# Patient Record
Sex: FEMALE | Race: WHITE | NOT HISPANIC OR LATINO | Employment: OTHER | URBAN - METROPOLITAN AREA
[De-identification: names, ages, dates, MRNs, and addresses within clinical notes are randomized per-mention and may not be internally consistent; named-entity substitution may affect disease eponyms.]

---

## 2017-05-22 ENCOUNTER — HOSPITAL ENCOUNTER (INPATIENT)
Facility: CLINIC | Age: 67
LOS: 2 days | Discharge: HOME | DRG: 390 | End: 2017-05-24
Attending: EMERGENCY MEDICINE | Admitting: HOSPITALIST
Payer: COMMERCIAL

## 2017-05-22 ENCOUNTER — APPOINTMENT (OUTPATIENT)
Dept: CT IMAGING | Facility: CLINIC | Age: 67
DRG: 390 | End: 2017-05-22
Attending: EMERGENCY MEDICINE
Payer: COMMERCIAL

## 2017-05-22 DIAGNOSIS — K56.609 SBO (SMALL BOWEL OBSTRUCTION) (H): ICD-10-CM

## 2017-05-22 LAB
ALBUMIN SERPL-MCNC: 4.3 G/DL (ref 3.4–5)
ALBUMIN UR-MCNC: NEGATIVE MG/DL
ALP SERPL-CCNC: 88 U/L (ref 40–150)
ALT SERPL W P-5'-P-CCNC: 32 U/L (ref 0–50)
ANION GAP SERPL CALCULATED.3IONS-SCNC: 12 MMOL/L (ref 3–14)
APPEARANCE UR: ABNORMAL
AST SERPL W P-5'-P-CCNC: 29 U/L (ref 0–45)
BASOPHILS # BLD AUTO: 0 10E9/L (ref 0–0.2)
BASOPHILS NFR BLD AUTO: 0.3 %
BILIRUB SERPL-MCNC: 0.6 MG/DL (ref 0.2–1.3)
BILIRUB UR QL STRIP: NEGATIVE
BUN SERPL-MCNC: 12 MG/DL (ref 7–30)
CALCIUM SERPL-MCNC: 10.2 MG/DL (ref 8.5–10.1)
CHLORIDE SERPL-SCNC: 104 MMOL/L (ref 94–109)
CO2 SERPL-SCNC: 25 MMOL/L (ref 20–32)
COLOR UR AUTO: YELLOW
CREAT SERPL-MCNC: 0.68 MG/DL (ref 0.52–1.04)
DIFFERENTIAL METHOD BLD: NORMAL
EOSINOPHIL # BLD AUTO: 0.1 10E9/L (ref 0–0.7)
EOSINOPHIL NFR BLD AUTO: 0.5 %
ERYTHROCYTE [DISTWIDTH] IN BLOOD BY AUTOMATED COUNT: 12.3 % (ref 10–15)
GFR SERPL CREATININE-BSD FRML MDRD: 87 ML/MIN/1.7M2
GLUCOSE SERPL-MCNC: 118 MG/DL (ref 70–99)
GLUCOSE UR STRIP-MCNC: NEGATIVE MG/DL
HCT VFR BLD AUTO: 41.9 % (ref 35–47)
HGB BLD-MCNC: 14.1 G/DL (ref 11.7–15.7)
HGB UR QL STRIP: NEGATIVE
IMM GRANULOCYTES # BLD: 0 10E9/L (ref 0–0.4)
IMM GRANULOCYTES NFR BLD: 0.3 %
KETONES UR STRIP-MCNC: NEGATIVE MG/DL
LEUKOCYTE ESTERASE UR QL STRIP: ABNORMAL
LIPASE SERPL-CCNC: 160 U/L (ref 73–393)
LYMPHOCYTES # BLD AUTO: 1.6 10E9/L (ref 0.8–5.3)
LYMPHOCYTES NFR BLD AUTO: 17 %
MCH RBC QN AUTO: 29 PG (ref 26.5–33)
MCHC RBC AUTO-ENTMCNC: 33.7 G/DL (ref 31.5–36.5)
MCV RBC AUTO: 86 FL (ref 78–100)
MONOCYTES # BLD AUTO: 0.8 10E9/L (ref 0–1.3)
MONOCYTES NFR BLD AUTO: 8.2 %
NEUTROPHILS # BLD AUTO: 6.8 10E9/L (ref 1.6–8.3)
NEUTROPHILS NFR BLD AUTO: 73.7 %
NITRATE UR QL: NEGATIVE
NRBC # BLD AUTO: 0 10*3/UL
NRBC BLD AUTO-RTO: 0 /100
PH UR STRIP: 8 PH (ref 5–7)
PLATELET # BLD AUTO: 274 10E9/L (ref 150–450)
POTASSIUM SERPL-SCNC: 3.6 MMOL/L (ref 3.4–5.3)
PROT SERPL-MCNC: 8 G/DL (ref 6.8–8.8)
RBC # BLD AUTO: 4.86 10E12/L (ref 3.8–5.2)
SODIUM SERPL-SCNC: 141 MMOL/L (ref 133–144)
SP GR UR STRIP: 1.01 (ref 1–1.03)
URN SPEC COLLECT METH UR: ABNORMAL
UROBILINOGEN UR STRIP-MCNC: NORMAL MG/DL (ref 0–2)
WBC # BLD AUTO: 9.2 10E9/L (ref 4–11)

## 2017-05-22 PROCEDURE — 25000128 H RX IP 250 OP 636: Performed by: EMERGENCY MEDICINE

## 2017-05-22 PROCEDURE — 99223 1ST HOSP IP/OBS HIGH 75: CPT | Mod: AI | Performed by: HOSPITALIST

## 2017-05-22 PROCEDURE — 85025 COMPLETE CBC W/AUTO DIFF WBC: CPT | Performed by: EMERGENCY MEDICINE

## 2017-05-22 PROCEDURE — 96361 HYDRATE IV INFUSION ADD-ON: CPT

## 2017-05-22 PROCEDURE — 74177 CT ABD & PELVIS W/CONTRAST: CPT

## 2017-05-22 PROCEDURE — 81003 URINALYSIS AUTO W/O SCOPE: CPT | Performed by: EMERGENCY MEDICINE

## 2017-05-22 PROCEDURE — 99285 EMERGENCY DEPT VISIT HI MDM: CPT | Mod: 25

## 2017-05-22 PROCEDURE — 12000000 ZZH R&B MED SURG/OB

## 2017-05-22 PROCEDURE — 25000128 H RX IP 250 OP 636: Performed by: HOSPITALIST

## 2017-05-22 PROCEDURE — 96375 TX/PRO/DX INJ NEW DRUG ADDON: CPT

## 2017-05-22 PROCEDURE — 83690 ASSAY OF LIPASE: CPT | Performed by: EMERGENCY MEDICINE

## 2017-05-22 PROCEDURE — 80053 COMPREHEN METABOLIC PANEL: CPT | Performed by: EMERGENCY MEDICINE

## 2017-05-22 PROCEDURE — 25000125 ZZHC RX 250: Performed by: EMERGENCY MEDICINE

## 2017-05-22 PROCEDURE — 96374 THER/PROPH/DIAG INJ IV PUSH: CPT

## 2017-05-22 PROCEDURE — 25000125 ZZHC RX 250: Performed by: HOSPITALIST

## 2017-05-22 PROCEDURE — S0028 INJECTION, FAMOTIDINE, 20 MG: HCPCS | Performed by: HOSPITALIST

## 2017-05-22 RX ORDER — KETOROLAC TROMETHAMINE 15 MG/ML
15 INJECTION, SOLUTION INTRAMUSCULAR; INTRAVENOUS EVERY 6 HOURS PRN
Status: DISCONTINUED | OUTPATIENT
Start: 2017-05-22 | End: 2017-05-24 | Stop reason: HOSPADM

## 2017-05-22 RX ORDER — ONDANSETRON 2 MG/ML
4 INJECTION INTRAMUSCULAR; INTRAVENOUS EVERY 6 HOURS PRN
Status: DISCONTINUED | OUTPATIENT
Start: 2017-05-22 | End: 2017-05-24 | Stop reason: HOSPADM

## 2017-05-22 RX ORDER — IOPAMIDOL 755 MG/ML
70 INJECTION, SOLUTION INTRAVASCULAR ONCE
Status: COMPLETED | OUTPATIENT
Start: 2017-05-22 | End: 2017-05-22

## 2017-05-22 RX ORDER — POLYETHYLENE GLYCOL 3350 17 G/17G
17 POWDER, FOR SOLUTION ORAL DAILY PRN
Status: DISCONTINUED | OUTPATIENT
Start: 2017-05-22 | End: 2017-05-24 | Stop reason: HOSPADM

## 2017-05-22 RX ORDER — KETOROLAC TROMETHAMINE 15 MG/ML
15 INJECTION, SOLUTION INTRAMUSCULAR; INTRAVENOUS ONCE
Status: COMPLETED | OUTPATIENT
Start: 2017-05-22 | End: 2017-05-22

## 2017-05-22 RX ORDER — POTASSIUM CHLORIDE 1500 MG/1
20-40 TABLET, EXTENDED RELEASE ORAL
Status: DISCONTINUED | OUTPATIENT
Start: 2017-05-22 | End: 2017-05-24 | Stop reason: HOSPADM

## 2017-05-22 RX ORDER — METOPROLOL TARTRATE 1 MG/ML
5 INJECTION, SOLUTION INTRAVENOUS 4 TIMES DAILY
Status: DISCONTINUED | OUTPATIENT
Start: 2017-05-22 | End: 2017-05-23

## 2017-05-22 RX ORDER — POTASSIUM CHLORIDE 7.45 MG/ML
10 INJECTION INTRAVENOUS
Status: DISCONTINUED | OUTPATIENT
Start: 2017-05-22 | End: 2017-05-24 | Stop reason: HOSPADM

## 2017-05-22 RX ORDER — PROCHLORPERAZINE 25 MG
12.5 SUPPOSITORY, RECTAL RECTAL EVERY 12 HOURS PRN
Status: DISCONTINUED | OUTPATIENT
Start: 2017-05-22 | End: 2017-05-24 | Stop reason: HOSPADM

## 2017-05-22 RX ORDER — SODIUM CHLORIDE 9 MG/ML
INJECTION, SOLUTION INTRAVENOUS CONTINUOUS
Status: DISCONTINUED | OUTPATIENT
Start: 2017-05-22 | End: 2017-05-24 | Stop reason: HOSPADM

## 2017-05-22 RX ORDER — BISACODYL 10 MG
10 SUPPOSITORY, RECTAL RECTAL DAILY PRN
Status: DISCONTINUED | OUTPATIENT
Start: 2017-05-22 | End: 2017-05-24 | Stop reason: HOSPADM

## 2017-05-22 RX ORDER — ACETAMINOPHEN 650 MG/1
650 SUPPOSITORY RECTAL EVERY 4 HOURS PRN
Status: DISCONTINUED | OUTPATIENT
Start: 2017-05-22 | End: 2017-05-24 | Stop reason: HOSPADM

## 2017-05-22 RX ORDER — ACETAMINOPHEN 325 MG/1
650 TABLET ORAL EVERY 4 HOURS PRN
Status: DISCONTINUED | OUTPATIENT
Start: 2017-05-22 | End: 2017-05-24 | Stop reason: HOSPADM

## 2017-05-22 RX ORDER — POTASSIUM CHLORIDE 29.8 MG/ML
20 INJECTION INTRAVENOUS
Status: DISCONTINUED | OUTPATIENT
Start: 2017-05-22 | End: 2017-05-24 | Stop reason: HOSPADM

## 2017-05-22 RX ORDER — AMOXICILLIN 250 MG
1-2 CAPSULE ORAL 2 TIMES DAILY PRN
Status: DISCONTINUED | OUTPATIENT
Start: 2017-05-22 | End: 2017-05-24 | Stop reason: HOSPADM

## 2017-05-22 RX ORDER — POTASSIUM CL/LIDO/0.9 % NACL 10MEQ/0.1L
10 INTRAVENOUS SOLUTION, PIGGYBACK (ML) INTRAVENOUS
Status: DISCONTINUED | OUTPATIENT
Start: 2017-05-22 | End: 2017-05-24 | Stop reason: HOSPADM

## 2017-05-22 RX ORDER — ONDANSETRON 2 MG/ML
4 INJECTION INTRAMUSCULAR; INTRAVENOUS EVERY 30 MIN PRN
Status: DISCONTINUED | OUTPATIENT
Start: 2017-05-22 | End: 2017-05-22

## 2017-05-22 RX ORDER — NALOXONE HYDROCHLORIDE 0.4 MG/ML
.1-.4 INJECTION, SOLUTION INTRAMUSCULAR; INTRAVENOUS; SUBCUTANEOUS
Status: DISCONTINUED | OUTPATIENT
Start: 2017-05-22 | End: 2017-05-24 | Stop reason: HOSPADM

## 2017-05-22 RX ORDER — ONDANSETRON 4 MG/1
4 TABLET, ORALLY DISINTEGRATING ORAL EVERY 6 HOURS PRN
Status: DISCONTINUED | OUTPATIENT
Start: 2017-05-22 | End: 2017-05-24 | Stop reason: HOSPADM

## 2017-05-22 RX ORDER — HYDROMORPHONE HYDROCHLORIDE 1 MG/ML
.3-.5 INJECTION, SOLUTION INTRAMUSCULAR; INTRAVENOUS; SUBCUTANEOUS
Status: DISCONTINUED | OUTPATIENT
Start: 2017-05-22 | End: 2017-05-24 | Stop reason: HOSPADM

## 2017-05-22 RX ORDER — PROCHLORPERAZINE MALEATE 5 MG
5 TABLET ORAL EVERY 6 HOURS PRN
Status: DISCONTINUED | OUTPATIENT
Start: 2017-05-22 | End: 2017-05-24 | Stop reason: HOSPADM

## 2017-05-22 RX ORDER — HYDROCODONE BITARTRATE AND ACETAMINOPHEN 5; 325 MG/1; MG/1
1-2 TABLET ORAL EVERY 4 HOURS PRN
Status: DISCONTINUED | OUTPATIENT
Start: 2017-05-22 | End: 2017-05-24 | Stop reason: HOSPADM

## 2017-05-22 RX ORDER — LIDOCAINE 40 MG/G
CREAM TOPICAL
Status: DISCONTINUED | OUTPATIENT
Start: 2017-05-22 | End: 2017-05-24 | Stop reason: HOSPADM

## 2017-05-22 RX ORDER — SODIUM CHLORIDE 9 MG/ML
1000 INJECTION, SOLUTION INTRAVENOUS CONTINUOUS
Status: DISCONTINUED | OUTPATIENT
Start: 2017-05-22 | End: 2017-05-22

## 2017-05-22 RX ORDER — POTASSIUM CHLORIDE 1.5 G/1.58G
20-40 POWDER, FOR SOLUTION ORAL
Status: DISCONTINUED | OUTPATIENT
Start: 2017-05-22 | End: 2017-05-24 | Stop reason: HOSPADM

## 2017-05-22 RX ORDER — HYDROMORPHONE HYDROCHLORIDE 1 MG/ML
0.5 INJECTION, SOLUTION INTRAMUSCULAR; INTRAVENOUS; SUBCUTANEOUS
Status: DISCONTINUED | OUTPATIENT
Start: 2017-05-22 | End: 2017-05-22

## 2017-05-22 RX ADMIN — METOPROLOL TARTRATE 5 MG: 5 INJECTION INTRAVENOUS at 22:21

## 2017-05-22 RX ADMIN — HYDROMORPHONE HYDROCHLORIDE 0.5 MG: 1 INJECTION, SOLUTION INTRAMUSCULAR; INTRAVENOUS; SUBCUTANEOUS at 16:44

## 2017-05-22 RX ADMIN — FAMOTIDINE 20 MG: 10 INJECTION, SOLUTION INTRAVENOUS at 22:20

## 2017-05-22 RX ADMIN — ONDANSETRON 4 MG: 2 SOLUTION INTRAMUSCULAR; INTRAVENOUS at 16:38

## 2017-05-22 RX ADMIN — SODIUM CHLORIDE: 9 INJECTION, SOLUTION INTRAVENOUS at 20:27

## 2017-05-22 RX ADMIN — SODIUM CHLORIDE 1000 ML: 9 INJECTION, SOLUTION INTRAVENOUS at 16:45

## 2017-05-22 RX ADMIN — KETOROLAC TROMETHAMINE 15 MG: 15 INJECTION, SOLUTION INTRAMUSCULAR; INTRAVENOUS at 16:34

## 2017-05-22 RX ADMIN — IOPAMIDOL 70 ML: 755 INJECTION, SOLUTION INTRAVENOUS at 17:34

## 2017-05-22 RX ADMIN — SODIUM CHLORIDE 61 ML: 9 INJECTION, SOLUTION INTRAVENOUS at 17:34

## 2017-05-22 ASSESSMENT — ENCOUNTER SYMPTOMS
ABDOMINAL DISTENTION: 0
CHEST TIGHTNESS: 0
NEUROLOGICAL NEGATIVE: 1
CHILLS: 0
DIARRHEA: 0
BLOOD IN STOOL: 0
FEVER: 0
ABDOMINAL PAIN: 1
JOINT SWELLING: 0
DYSURIA: 0
NAUSEA: 1
SHORTNESS OF BREATH: 0
COUGH: 0
SORE THROAT: 0
CONSTIPATION: 0
BACK PAIN: 1

## 2017-05-22 NOTE — ED NOTES
"[facility]  ED Nurse Handoff Report    ED Chief complaint: Abdominal Pain (started in low back and now in abdomen, c/o nausea that started this morning ) and Back Pain      ED Diagnosis:   Final diagnoses:   SBO (small bowel obstruction) (H)       Code Status: Full Code    Allergies:   Allergies   Allergen Reactions     Penicillins      \"severe migraines\"       Activity level - Baseline/Home:  Independent    Activity Level - Current:   Independent     Needed?: No    Isolation: No  Infection: Not Applicable    Bariatric?: No    Vital Signs:   Vitals:    05/22/17 1605 05/22/17 1700 05/22/17 1800   BP: 172/90 149/77 145/75   Resp: 20     Temp: 97.9  F (36.6  C)     TempSrc: Temporal     SpO2: 100% 99% 100%   Height: 1.676 m (5' 6\")         Cardiac Rhythm: ,        Pain level: 0-10 Pain Scale: 2    Is this patient confused?: No    Patient Report: Initial Complaint: burning diffuse abd pain radiating into back.  Started this morning, Hx of renal stones, but has never passed and stone.  Focused Assessment: Pt has bilateral flank pain and a \"burning\" sensation in her abd.  She initially rated her pain 10/10 with is relieved with 1 dose 0.5mg of dilaudid and 15mg toradol IV.  Pt given 4mg Zofran for nausea and 1L NS.  Pts pain is now 2/10 and nausea is improved.  Pt denies changes in BM recently and has hx of abd surgeries, but no SBO previously.  BS hypoactive. Pt is AOx4, VSS, labs WDL.  Tests Performed: CBC, UA, CMP, CT abd/pel  Abnormal Results: CT shows SBO  Treatments provided: see above    Family Comments:  at bedside    OBS brochure/video discussed/provided to patient: No    ED Medications:   Medications   0.9% sodium chloride BOLUS (0 mLs Intravenous Stopped 5/22/17 1823)     Followed by   0.9% sodium chloride infusion (not administered)   HYDROmorphone (PF) (DILAUDID) injection 0.5 mg (0.5 mg Intravenous Given 5/22/17 4304)   ondansetron (ZOFRAN) injection 4 mg (4 mg Intravenous " Given 5/22/17 1638)   ketorolac (TORADOL) injection 15 mg (15 mg Intravenous Given 5/22/17 1634)   sodium chloride 0.9 % for CT scan flush dose 61 mL (61 mLs Intravenous Given 5/22/17 1734)   iopamidol (ISOVUE-370) solution 70 mL (70 mLs Intravenous Given 5/22/17 1734)       Drips infusing?:  No      ED NURSE PHONE NUMBER: [phone]

## 2017-05-22 NOTE — PHARMACY-ADMISSION MEDICATION HISTORY
Admission medication history interview status for the 5/22/2017  admission is complete. See EPIC admission navigator for prior to admission medications     Medication history source reliability:Good    Actions taken by pharmacist (provider contacted, etc):None     Additional medication history information not noted on PTA med list :None    Medication reconciliation/reorder completed by provider prior to medication history? No    Time spent in this activity: 10 minutes    Prior to Admission medications    Medication Sig Last Dose Taking? Auth Provider   metoprolol (LOPRESSOR) 25 MG tablet Take 1 tablet (25 mg) by mouth 2 times daily 5/22/2017 at x1 Yes Josh Simental MD   atorvastatin (LIPITOR) 20 MG tablet Take 1 tablet (20 mg) by mouth daily 5/21/2017 at HS Yes Josh Simental MD   aspirin 81 MG tablet Take 81 mg by mouth daily 5/22/2017 at Unknown time Yes Reported, Patient   Probiotic Product (PROBIOTIC DAILY PO) Take 1 tablet by mouth At Bedtime  5/21/2017 at Unknown time Yes Reported, Patient   Cholecalciferol (VITAMIN D3 PO) Take 2,000 Units by mouth daily  5/22/2017 at Unknown time Yes Reported, Patient   Calcium Carbonate-Vitamin D (CALCIUM + D PO) Take 1 chew tab by mouth 2 times daily  5/22/2017 at x1 Yes Reported, Patient   Omega-3 Fatty Acids (OMEGA-3 FISH OIL PO) Take 1 g by mouth 2 times daily (with meals)  5/22/2017 at x1 Yes Reported, Patient     Gabriela Castro, PharmD

## 2017-05-22 NOTE — IP AVS SNAPSHOT
MRN:[id number]                      After Visit Summary   5/22/2017    Amaya Huff    MRN: [id number]           Thank you!     Thank you for choosing [facility] for your care. Our goal is always to provide you with excellent care. Hearing back from our patients is one way we can continue to improve our services. Please take a few minutes to complete the written survey that you may receive in the mail after you visit with us. Thank you!        Patient Information     Date Of Birth          1950        Designated Caregiver       Most Recent Value    Caregiver    Will someone help with your care after discharge? no      About your hospital stay     You were admitted on:  May 22, 2017 You last received care in the:  [facility] [address] [facility]    You were discharged on:  May 24, 2017       Who to Call     For medical emergencies, please call 911.  For non-urgent questions about your medical care, please call your primary care provider or clinic, [phone]          Attending Provider     Provider Specialty    Brent Shi MD Emergency Medicine    Pop, Manpreet Kelly MD Internal Medicine       Primary Care Provider Office Phone # Fax #    Bhavale Palm [phone]       [facility] [address]  [place] [address]        After Care Instructions     Activity       Your activity upon discharge: activity as tolerated            Diet       Follow this diet upon discharge:      Regular Diet Adult                  Follow-up Appointments     Follow-up and recommended labs and tests        Follow up with primary care provider.  No follow up labs or test are needed.                  Pending Results     No orders found from 5/20/2017 to 5/23/2017.            Statement of Approval     Ordered          05/24/17 1123  I have reviewed and agree with all the recommendations and orders detailed in this document.  EFFECTIVE NOW     Approved and  "electronically signed by:  Terence Park MD             Admission Information     Date & Time Provider Department Dept. Phone    5/22/2017 Manpreet Pop MD [place] [facility] [address] [facility] [phone]      Your Vitals Were     Blood Pressure Pulse Temperature Respirations Height Weight    130/69 (BP Location: Left arm) 66 97.5  F (36.4  C) (Oral) 18 1.676 m (5' 6\") 68.4 kg (150 lb 12.8 oz)    Pulse Oximetry BMI (Body Mass Index)                100% 24.34 kg/m2          MyChart Information     [facility] gives you secure access to your electronic health record. If you see a primary care provider, you can also send messages to your care team and make appointments. If you have questions, please call your primary care clinic.  If you do not have a primary care provider, please call [phone] and they will assist you.        Care EveryWhere ID     This is your Care EveryWhere ID. This could be used by other organizations to access your [facility] medical records  [id number]           Review of your medicines      CONTINUE these medicines which have NOT CHANGED        Dose / Directions    aspirin 81 MG tablet        Dose:  81 mg   Take 81 mg by mouth daily   Refills:  0       atorvastatin 20 MG tablet   Commonly known as:  LIPITOR   Used for:  Hyperlipidemia LDL goal <130        Dose:  20 mg   Take 1 tablet (20 mg) by mouth daily   Quantity:  90 tablet   Refills:  3       CALCIUM + D PO        Dose:  1 chew tab   Take 1 chew tab by mouth 2 times daily   Refills:  0       metoprolol 25 MG tablet   Commonly known as:  LOPRESSOR   Used for:  Heart palpitations, Coronary artery disease, Chest pain        Dose:  25 mg   Take 1 tablet (25 mg) by mouth 2 times daily   Quantity:  180 tablet   Refills:  3       OMEGA-3 FISH OIL PO        Dose:  1 g   Take 1 g by mouth 2 times daily (with meals)   Refills:  0       PROBIOTIC DAILY PO        Dose:  1 tablet   Take 1 tablet by mouth At Bedtime   Refills:  " 0       VITAMIN D3 PO        Dose:  2000 Units   Take 2,000 Units by mouth daily   Refills:  0                Protect others around you: Learn how to safely use, store and throw away your medicines at www.disposemymeds.org.             Medication List: This is a list of all your medications and when to take them. Check marks below indicate your daily home schedule. Keep this list as a reference.      Medications           Morning Afternoon Evening Bedtime As Needed    aspirin 81 MG tablet   Take 81 mg by mouth daily   Next Dose Due:  5/25                                   atorvastatin 20 MG tablet   Commonly known as:  LIPITOR   Take 1 tablet (20 mg) by mouth daily   Next Dose Due:  tonight                                   CALCIUM + D PO   Take 1 chew tab by mouth 2 times daily   Next Dose Due:  tonight                                      metoprolol 25 MG tablet   Commonly known as:  LOPRESSOR   Take 1 tablet (25 mg) by mouth 2 times daily   Last time this was given:  25 mg on 5/24/2017  8:54 AM   Next Dose Due:  tonight                                      OMEGA-3 FISH OIL PO   Take 1 g by mouth 2 times daily (with meals)   Next Dose Due:  tonight                                      PROBIOTIC DAILY PO   Take 1 tablet by mouth At Bedtime   Next Dose Due:  tonight                                   VITAMIN D3 PO   Take 2,000 Units by mouth daily   Next Dose Due:  5/25

## 2017-05-22 NOTE — IP AVS SNAPSHOT
[facility] [address] [facility]    [address] [place] [address]    Phone:  [phone]                                       After Visit Summary   5/22/2017    Amaya Huff    MRN: [id number]           After Visit Summary Signature Page     I have received my discharge instructions, and my questions have been answered. I have discussed any challenges I see with this plan with the nurse or doctor.    ..........................................................................................................................................  Patient/Patient Representative Signature      ..........................................................................................................................................  Patient Representative Print Name and Relationship to Patient    ..................................................               ................................................  Date                                            Time    ..........................................................................................................................................  Reviewed by Signature/Title    ...................................................              ..............................................  Date                                                            Time

## 2017-05-22 NOTE — ED PROVIDER NOTES
"  History     Chief Complaint:    Abdominal Pain (started in low back and now in abdomen, c/o nausea that started this morning ) and Back Pain      HPI Comments: 66-year-old female with acute onset of generalized abdominal pain and low back pain that started about 11 AM today.  No previous history.  History of laparoscopic and two  surgeries.  Nausea but no vomiting.  Normal bowel movement yesterday.  No history of small bowel obstruction or other intra-abdominal process.  No dysuria urgency or frequency no fever chills or recent travel.         Allergies:    Allergies   Allergen Reactions     Penicillins      \"severe migraines\"        Medications:        metoprolol (LOPRESSOR) 25 MG tablet   atorvastatin (LIPITOR) 20 MG tablet   aspirin 81 MG tablet   Probiotic Product (PROBIOTIC DAILY PO)   Cholecalciferol (VITAMIN D3 PO)   Calcium Carbonate-Vitamin D (CALCIUM + D PO)   Omega-3 Fatty Acids (OMEGA-3 FISH OIL PO)       Problem List:      Patient Active Problem List    Diagnosis Date Noted     Mild CAD 2016     Priority: Medium     Hyperlipidemia with target LDL less than 130 2015     Priority: Medium     Diagnosis updated by automated process. Provider to review and confirm.       Premature beats 2014     Priority: Medium     Problem list name updated by automated process. Provider to review       Mitral valve disorder 2014     Priority: Medium     Problem list name updated by automated process. Provider to review       Advanced directives, counseling/discussion 2015     Advance Care Planning:   ACP Review and Resources Provided:  Reviewed chart for advance care plan.  Amaya Huff has no plan or code status on file. Letter sent.  Added by Marianne Smith on 2015             Abdominal pain 2012        Past Medical History:      Past Medical History:   Diagnosis Date     Coronary artery disease      Heart palpitations      History of angina      Irregular " "heartbeat      Mitral valve disorders      Other premature beats      Unspecified essential hypertension        Past Surgical History:      Past Surgical History:   Procedure Laterality Date     C NONSPECIFIC PROCEDURE      S/P T&A     C NONSPECIFIC PROCEDURE      LASER FIBROIDS     C NONSPECIFIC PROCEDURE       x2     COLONOSCOPY  2012    Procedure:COLONOSCOPY; COLONOSCOPY; Surgeon:GINGER PARKS; Location: GI     CORONARY ANGIOGRAPHY ADULT ORDER  3/18/14    3/18/14- Mild to moderate CAD, no interventions, treat medically.     ENT SURGERY  tonsils     GYN SURGERY  c sections       Family History:      Family History   Problem Relation Age of Onset     C.A.D. Mother      Circulatory Maternal Grandmother      MVP     Circulatory Maternal Aunt      MVP     Alcohol/Drug Daughter 30     Parkinsonism Mother 80       Social History:    Marital Status:   [2]  Social History   Substance Use Topics     Smoking status: Never Smoker     Smokeless tobacco: Never Used     Alcohol use 0.0 oz/week     0 Standard drinks or equivalent per week      Comment: 1 glass wine/day        Review of Systems   Constitutional: Negative for chills and fever.   HENT: Negative for sore throat.    Respiratory: Negative for cough, chest tightness and shortness of breath.    Cardiovascular: Negative for chest pain.   Gastrointestinal: Positive for abdominal pain and nausea. Negative for abdominal distention, blood in stool, constipation and diarrhea.   Genitourinary: Negative for dysuria.   Musculoskeletal: Positive for back pain. Negative for joint swelling.   Neurological: Negative.    All other systems reviewed and are negative.        Physical Exam   First Vitals:  BP: 172/90  Heart Rate: 110  Temp: 97.9  F (36.6  C)  Resp: 20  Height: 167.6 cm (5' 6\")  SpO2: 100 %    Physical Exam   Constitutional: She is oriented to person, place, and time.   Awake alert acute abdominal distress.   HENT:   Mouth/Throat: " Oropharynx is clear and moist.   Eyes: Conjunctivae and EOM are normal. Pupils are equal, round, and reactive to light.   Neck: Normal range of motion. Neck supple. No JVD present.   Cardiovascular: Normal rate, regular rhythm and normal heart sounds.    Pulmonary/Chest: Effort normal and breath sounds normal.   Abdominal: Soft. She exhibits no distension. There is tenderness (generalized tenderness but soft no rebound or guarding no obvious mass.).   Musculoskeletal: Normal range of motion.   No focal back spasm or palpable tenderness.   Lymphadenopathy:     She has no cervical adenopathy.   Neurological: She is alert and oriented to person, place, and time.   Skin: Skin is warm and dry.   Nursing note and vitals reviewed.        Emergency Department Course       Imaging:       CT Abdomen Pelvis w Contrast (Preliminary result) Result time: 17 17:50:17     Preliminary result by Cookie Brito (17 17:50:17)     Impression:     IMPRESSION:  1. Mid small bowel obstruction of uncertain etiology.  2. Sigmoid diverticula without acute diverticulitis.  3. Left renal stone. No ureteral stone or hydronephrosis.         Laboratory:  Urinalysis CBC chemistry panel , Liver function tests and lipase are normal          Interventions:  IV fluids, Toradol, Dilaudid, aand zofran- patient feeling much better.          Impression & Plan           Medical Decision Makin-year-old female with acute Onset generalized abdominal pain   CT scan shows mid small bowel  Obstruction, unknown etiology.  Laboratory studies urinalysis Are unremarkable.  Will admit with Dr. Pop For bowel rest fluids and pain  Control.  General surgery consult.     Diagnosis:    ICD-10-CM    1. SBO (small bowel obstruction) (H) K56.69        Disposition:  Admitted to Medical bed with Manpreet Pop MD, PhD    Discharge Medications:  New Prescriptions    No medications on file         Brent Shi  2017    EMERGENCY DEPARTMENT        Yuridia, Brent Barroso MD  05/22/17 180

## 2017-05-23 ENCOUNTER — APPOINTMENT (OUTPATIENT)
Dept: ULTRASOUND IMAGING | Facility: CLINIC | Age: 67
DRG: 390 | End: 2017-05-23
Attending: SURGERY
Payer: COMMERCIAL

## 2017-05-23 LAB
ANION GAP SERPL CALCULATED.3IONS-SCNC: 8 MMOL/L (ref 3–14)
BASOPHILS # BLD AUTO: 0 10E9/L (ref 0–0.2)
BASOPHILS NFR BLD AUTO: 0.4 %
BUN SERPL-MCNC: 13 MG/DL (ref 7–30)
CALCIUM SERPL-MCNC: 8.2 MG/DL (ref 8.5–10.1)
CHLORIDE SERPL-SCNC: 111 MMOL/L (ref 94–109)
CO2 SERPL-SCNC: 25 MMOL/L (ref 20–32)
CREAT SERPL-MCNC: 0.78 MG/DL (ref 0.52–1.04)
DIFFERENTIAL METHOD BLD: ABNORMAL
EOSINOPHIL # BLD AUTO: 0.1 10E9/L (ref 0–0.7)
EOSINOPHIL NFR BLD AUTO: 2.5 %
ERYTHROCYTE [DISTWIDTH] IN BLOOD BY AUTOMATED COUNT: 12.6 % (ref 10–15)
GFR SERPL CREATININE-BSD FRML MDRD: 74 ML/MIN/1.7M2
GLUCOSE SERPL-MCNC: 95 MG/DL (ref 70–99)
HCT VFR BLD AUTO: 33.4 % (ref 35–47)
HGB BLD-MCNC: 10.9 G/DL (ref 11.7–15.7)
IMM GRANULOCYTES # BLD: 0 10E9/L (ref 0–0.4)
IMM GRANULOCYTES NFR BLD: 0 %
LYMPHOCYTES # BLD AUTO: 1.5 10E9/L (ref 0.8–5.3)
LYMPHOCYTES NFR BLD AUTO: 29.8 %
MCH RBC QN AUTO: 28.9 PG (ref 26.5–33)
MCHC RBC AUTO-ENTMCNC: 32.6 G/DL (ref 31.5–36.5)
MCV RBC AUTO: 89 FL (ref 78–100)
MONOCYTES # BLD AUTO: 0.4 10E9/L (ref 0–1.3)
MONOCYTES NFR BLD AUTO: 7.7 %
NEUTROPHILS # BLD AUTO: 3.1 10E9/L (ref 1.6–8.3)
NEUTROPHILS NFR BLD AUTO: 59.6 %
NRBC # BLD AUTO: 0 10*3/UL
NRBC BLD AUTO-RTO: 0 /100
PLATELET # BLD AUTO: 182 10E9/L (ref 150–450)
POTASSIUM SERPL-SCNC: 3.9 MMOL/L (ref 3.4–5.3)
RBC # BLD AUTO: 3.77 10E12/L (ref 3.8–5.2)
SODIUM SERPL-SCNC: 144 MMOL/L (ref 133–144)
WBC # BLD AUTO: 5.2 10E9/L (ref 4–11)

## 2017-05-23 PROCEDURE — 80048 BASIC METABOLIC PNL TOTAL CA: CPT | Performed by: HOSPITALIST

## 2017-05-23 PROCEDURE — 85025 COMPLETE CBC W/AUTO DIFF WBC: CPT | Performed by: HOSPITALIST

## 2017-05-23 PROCEDURE — 25000125 ZZHC RX 250: Performed by: HOSPITALIST

## 2017-05-23 PROCEDURE — 76705 ECHO EXAM OF ABDOMEN: CPT

## 2017-05-23 PROCEDURE — 99207 ZZC CDG-CHARGE REQUIRED MANUAL ENTRY: CPT | Performed by: INTERNAL MEDICINE

## 2017-05-23 PROCEDURE — 12000000 ZZH R&B MED SURG/OB

## 2017-05-23 PROCEDURE — 36415 COLL VENOUS BLD VENIPUNCTURE: CPT | Performed by: HOSPITALIST

## 2017-05-23 PROCEDURE — 99232 SBSQ HOSP IP/OBS MODERATE 35: CPT | Performed by: INTERNAL MEDICINE

## 2017-05-23 PROCEDURE — S0028 INJECTION, FAMOTIDINE, 20 MG: HCPCS | Performed by: HOSPITALIST

## 2017-05-23 PROCEDURE — 25000132 ZZH RX MED GY IP 250 OP 250 PS 637: Performed by: INTERNAL MEDICINE

## 2017-05-23 PROCEDURE — 99222 1ST HOSP IP/OBS MODERATE 55: CPT | Performed by: SURGERY

## 2017-05-23 PROCEDURE — 25000128 H RX IP 250 OP 636: Performed by: HOSPITALIST

## 2017-05-23 RX ORDER — METOPROLOL TARTRATE 25 MG/1
25 TABLET, FILM COATED ORAL 2 TIMES DAILY
Status: DISCONTINUED | OUTPATIENT
Start: 2017-05-23 | End: 2017-05-24 | Stop reason: HOSPADM

## 2017-05-23 RX ADMIN — SODIUM CHLORIDE: 9 INJECTION, SOLUTION INTRAVENOUS at 15:52

## 2017-05-23 RX ADMIN — SODIUM CHLORIDE: 9 INJECTION, SOLUTION INTRAVENOUS at 06:04

## 2017-05-23 RX ADMIN — FAMOTIDINE 20 MG: 10 INJECTION, SOLUTION INTRAVENOUS at 21:23

## 2017-05-23 RX ADMIN — METOPROLOL TARTRATE 25 MG: 25 TABLET, FILM COATED ORAL at 21:23

## 2017-05-23 RX ADMIN — FAMOTIDINE 20 MG: 10 INJECTION, SOLUTION INTRAVENOUS at 09:34

## 2017-05-23 RX ADMIN — METOPROLOL TARTRATE 5 MG: 5 INJECTION INTRAVENOUS at 09:34

## 2017-05-23 NOTE — PLAN OF CARE
Problem: Goal Outcome Summary  Goal: Goal Outcome Summary  Outcome: No Change  Patient arrived from ED around 1945. A&O. VSS. NPO. Ambulated to bathroom independently. Denies pain since arrival. Plan: IV fluids infusing, surgery consult.

## 2017-05-23 NOTE — H&P
PRIMARY CARE PHYSICIAN:  Dr. Palm      DATE OF SERVICE:  05/22/2017      CHIEF COMPLAINT:  Abdominal pain and nausea.      HISTORY OF PRESENT ILLNESS:  Amaya Huff is a pleasant 66-year-old female with past medical history of coronary artery disease, hyperlipidemia who presents to the Emergency Department with nausea, abdominal distention and discomfort.  Discomfort started this morning in her back after yoga.  She initially thought it was a muscle strain but had progressed throughout the day.  It had been rotated around to her abdomen reaching a level of 5-6 out at 10 by the end of the day.  She called her  and her primary care physician that did not have available spot in the clinic so they decided to come in the Emergency Department for evaluation.  She felt nauseous but no vomiting.  Last normal bowel movement was yesterday.  It was nonbloody.  She otherwise denies chest pain, shortness of breath, fevers or chills.  No dysuria or urinary frequency.  She does not have a history of SBO.  She has 2 prior C-sections and laparoscopic surgery for fibroids.      PAST MEDICAL HISTORY:     1.  Coronary artery disease with mild disease on angiogram without intervention on beta blocker and statin.   2.  Hyperlipidemia.   3.  Status post colonoscopy and updated mammograms in the chart.  Last colonoscopy was in 2012 with followup recommended in 10 years.      SOCIAL HISTORY:  The patient lives independently.   at the bedside.  No smoking, no alcohol.      FAMILY HISTORY:  Positive for coronary artery disease, otherwise negative.      REVIEW OF SYSTEMS:  Ten-point review of systems obtained with pertinent positives and negatives per HPI, otherwise negative.      PHYSICAL EXAMINATION:   VITAL SIGNS:  Temp 97.9, heart rate of 70, blood pressure 145/75, sats are 100% on room air.   CONSTITUTIONAL:  Patient lying in bed in no acute distress, alert and oriented x3.   HEENT:  Pupils equal, round to light.   Extraocular muscles intact.   NECK:  No significant cervical lymphadenopathy.   PULMONARY:  Clear to auscultation bilaterally.  No wheezes, rhonchi or rales.   CARDIAC:  Normal S1, S2.  Positive systolic murmur.   ABDOMEN:  Soft, slightly tender to deep palpation.  No peritonitis signs.  Positive bowel sounds.     MUSCULOSKELETAL:  Range of motion intact in all extremities.     SKIN:  No erythema.   PSYCHIATRIC:  Appropriate affect.   NEUROLOGIC:  Cranial nerves II-XII grossly intact.  Strength and sensation equal in extremities.      LABORATORY DATA:  Sodium 141, potassium 3.6, BUN 12, creatinine is 0.68.  WBC is 9.3, hemoglobin 14.1, platelets are 274.  Urinalysis is negative.      CT of abdomen and pelvis with contrast shows mid small bowel obstruction of uncertain etiology.  Sigmoid diverticula without diverticulitis.  Left renal stone.      ASSESSMENT AND PLAN:  Patient is a 66-year-old female with a past medical history of mild coronary artery disease/hyperlipidemia who presents with abdominal pain, found to have a mid small bowel obstruction.     1.  Small bowel obstruction.  Suspect adhesions secondary to prior C-sections and fibroid laparoscopic surgery.  Colonoscopy from 2012 was normal.  Mammogram negative and up-to-date.  No obvious malignancy on CT.  Will formally consult surgery as this is the first admission for this.  Will place on normal saline at 100 an hour.  Will have Dilaudid and Toradol available as needed.  Encourage walking.  Nothing by mouth.  Zantac intravenously.     2.  Coronary artery disease:  Transitioned by mouth metoprolol to IV metoprolol 5 mg q.i.d. with hold parameters.  Hold statin and aspirin for the short-term while nothing by mouth.     3.  Hyperlipidemia:  Hold statin.     4.  Fluids/Electrolytes/Nutrition:  Electrolytes otherwise stable.  Nothing by mouth.  Normal saline overnight.  Recheck labs again in the morning.     4.  Deep venous thrombosis prophylaxis:  Ambulatory.      5.  Gastrointestinal prophylaxis:  Intravenous Zantac.      CODE STATUS:  Full by default.      DISPOSITION:  Inpatient.         ZACK PAT MD             D: 2017 19:05   T: 2017 22:19   MT: STANISLAV      Name:     STEVEN BAE   MRN:      [id number]        Account:      [id number]   :      1950           Admitted:     [id number]      Document: [id number]       cc: Carline Palm MD

## 2017-05-23 NOTE — PROGRESS NOTES
"[facility]  Hospitalist Progress Note  Terence Park MD  05/23/2017    Assessment & Plan   Amaya Huff is a 66 year old female with a past medical history of mild coronary artery disease/hyperlipidemia who presents with abdominal pain, found to have a mid small bowel obstruction.     Small bowel obstruction.   Suspect due to adhesions secondary to prior C-sections and fibroid laparoscopic surgery. Colonoscopy from 2012 was normal. Mammogram negative and up-to-date. No obvious malignancy on CT.    - passing gas and +flatus this am  - start clear liquid diet  - continue IVF for now  - monitor for diet tolerance  - ambulation encouraged  - antiemetics and analgesics available prn  - gen surgery has been consulted during admission    Coronary artery disease:   - continue IV metoprolol 5 mg q.i.d. with hold parameters for now and change to PO if tolerating diet  -  Hold statin and aspirin for the short-term while nothing by mouth.     Hyperlipidemia: Hold statin.     Fluids/Electrolytes/Nutrition: clear liquid diet, IVF    Deep venous thrombosis prophylaxis: Ambulatory.       CODE STATUS: Full      DISPOSITION: anticipate discharge later today if tolerating diet or tomorrow      Interval History   Passed flatus and had BM this am. Feels abd distension is better. Denies nausea.   She is afebrile.     -Data reviewed today: I reviewed all new labs and imaging over the last 24 hours. I personally reviewed no images or EKG's today.    Physical Exam   Heart Rate: 77, Blood pressure 129/68, pulse 72, temperature 98.2  F (36.8  C), temperature source Oral, resp. rate 18, height 1.676 m (5' 6\"), weight 66.6 kg (146 lb 13.2 oz), SpO2 95 %, not currently breastfeeding.  Vitals:    05/22/17 2029 05/23/17 0609   Weight: 65 kg (143 lb 4.8 oz) 66.6 kg (146 lb 13.2 oz)     Vital Signs with Ranges  Temp:  [97.9  F (36.6  C)-98.2  F (36.8  C)] 98.2  F (36.8  C)  Pulse:  [66-82] 72  Heart Rate:  [] " 77  Resp:  [16-20] 18  BP: (115-172)/(55-90) 129/68  SpO2:  [95 %-100 %] 95 %  I/O's Last 24 hours  I/O last 3 completed shifts:  In: 962 [I.V.:962]  Out: 800 [Urine:800]    Constitutional: Alert, awake and no apparent distress   Respiratory: Clear to auscultation bilaterally, no wheezing   Cardiovascular: Regular rate and rhythm  GI: soft, non-tender, mild distension, active bowel sounds  Skin/Integumen: warm and dry, no rashes  Ext: no LE edema bilaterally    Medications   All medications I am responsible for were reviewed.    NaCl 100 mL/hr at 05/23/17 0604       sodium chloride (PF)  3 mL Intracatheter Q8H     famotidine  20 mg Intravenous Q12H     metoprolol  5 mg Intravenous 4x Daily        Data     Recent Labs  Lab 05/23/17  0750 05/22/17  1638   WBC 5.2 9.2   HGB 10.9* 14.1   MCV 89 86    274    141   POTASSIUM 3.9 3.6   CHLORIDE 111* 104   CO2 25 25   BUN 13 12   CR 0.78 0.68   ANIONGAP 8 12   SHILPA 8.2* 10.2*   GLC 95 118*   ALBUMIN  --  4.3   PROTTOTAL  --  8.0   BILITOTAL  --  0.6   ALKPHOS  --  88   ALT  --  32   AST  --  29   LIPASE  --  160       Recent Results (from the past 24 hour(s))   CT Abdomen Pelvis w Contrast    Narrative    CT ABDOMEN AND PELVIS WITH CONTRAST   5/22/2017 5:41 PM      HISTORY: Generalized abdominal pain. Low back pain. Nausea.    TECHNIQUE: CT abdomen and pelvis with intravenous contrast. Radiation  dose for this scan was reduced using automated exposure control,  adjustment of the mA and/or kV according to patient size, or iterative  reconstruction technique. 70mL Isovue-370.    COMPARISON: None.    FINDINGS:  Abdomen: There is minimal dependent atelectasis at the lung bases. The  heart size is normal. There is a small cyst in the right lobe of the   liver medially. 1.5 cm cyst in the left lobe of the liver. The spleen,  gallbladder, pancreas, adrenal glands are normal in appearance. There  are two tiny cysts in the upper pole of the right kidney. There is a  0.5  cm stone in the lower pole of the left kidney. There is no  abdominal or pelvic lymph node enlargement. There is atherosclerotic  calcification of the aorta and its branches. No aneurysm.    Pelvis: Proximal and mid small bowel are dilated. Distal small bowel  and colon are nondilated. Transition point is not certain. There are  sigmoid diverticula without acute diverticulitis. No free  intraperitoneal gas or fluid. Uterus and adnexa appear normal.  Degenerative disease in the spine.      Impression    IMPRESSION:  1. Mid small bowel obstruction of uncertain etiology.  2. Sigmoid diverticula without acute diverticulitis.  3. Left renal stone. No ureteral stone or hydronephrosis.    KIKI SARGENT MD

## 2017-05-23 NOTE — PLAN OF CARE
Problem: Goal Outcome Summary  Goal: Goal Outcome Summary  Outcome: No Change  Alert & oriented x 4, independent, NPO, surgical consult today, c/o lower abdominal pain 3/10 but decline intervention, denies nausea, bowel sounds present, passing gas, VSS on RA, IVF at 100 ml/hr.

## 2017-05-23 NOTE — CONSULTS
[facility]  General Surgery Consultation         Carlito Rosario MD    Amaya Huff MRN# [id number]   YOB: 1950 Age: 66 year old      Date of Admission:  2017  Date of Consult: 2017         Assessment and Plan:   Pleasant 66-year-old healthy female presents with abdominal pain and distention.  Was found on CT scan to have dilated proximal bowel suggestive of bowel obstruction.  No transition point identified.  No obvious cause for the bowel obstruction despite the fact that patient has had two  sections.  No other generalized abdominal surgeries.  She seems to be having return of bowel function at this point.  Patient may have had a transient partial bowel obstruction but appears to be clinically resolving.  Recommend clear liquids, advance to full liquids as tolerated.  No indications for surgery at this time.            Code Status:   Full Code         Primary Care Physician:   Carline Palm [phone]         Requesting Physician:      Donn         Chief Complaint:   Abdominal pain    History is obtained from the patient         History of Present Illness:   Amaya Huff is a 66 year old female who presented with severe centralized abdominal pain since last evening.  Patient had been functioning normally when she began having vague centralized upper abdominal pain.  This became increasingly severe over the subsequent several hours and she presented to the emergency department with worsening symptoms.  She has had some nausea but no emesis.  Last bowel movement was two days ago.  She was seen in the emergency department and a CT scan was obtained.  This suggested proximal bowel obstruction with no clearly identified transition point.  Previous surgeries have been  sections.  She does have a history of fibroid tumors in the uterus but these apparently resolved with no intervention.  We are asked to help evaluate.           Past Medical  History:   Amaya Huff  has a past medical history of Coronary artery disease; Heart palpitations; History of angina; Irregular heartbeat; Mitral valve disorders; Other premature beats; and Unspecified essential hypertension (1995).         Past Surgical History:   Amaya Huff  has a past surgical history that includes NONSPECIFIC PROCEDURE; NONSPECIFIC PROCEDURE; NONSPECIFIC PROCEDURE; Colonoscopy (2/27/2012); ENT surgery (tonsils); GYN surgery (c sections); and Coronary Angiography Adult Order (3/18/14).         Home Medications:     Prior to Admission medications    Medication Sig Last Dose Taking? Auth Provider   metoprolol (LOPRESSOR) 25 MG tablet Take 1 tablet (25 mg) by mouth 2 times daily 5/22/2017 at x1 Yes Josh Simental MD   atorvastatin (LIPITOR) 20 MG tablet Take 1 tablet (20 mg) by mouth daily 5/21/2017 at HS Yes Josh Simental MD   aspirin 81 MG tablet Take 81 mg by mouth daily 5/22/2017 at Unknown time Yes Reported, Patient   Probiotic Product (PROBIOTIC DAILY PO) Take 1 tablet by mouth At Bedtime  5/21/2017 at Unknown time Yes Reported, Patient   Cholecalciferol (VITAMIN D3 PO) Take 2,000 Units by mouth daily  5/22/2017 at Unknown time Yes Reported, Patient   Calcium Carbonate-Vitamin D (CALCIUM + D PO) Take 1 chew tab by mouth 2 times daily  5/22/2017 at x1 Yes Reported, Patient   Omega-3 Fatty Acids (OMEGA-3 FISH OIL PO) Take 1 g by mouth 2 times daily (with meals)  5/22/2017 at x1 Yes Reported, Patient            Current Medications:           sodium chloride (PF)  3 mL Intracatheter Q8H     famotidine  20 mg Intravenous Q12H     metoprolol  5 mg Intravenous 4x Daily     lidocaine, lidocaine 4%, sodium chloride (PF), potassium chloride, potassium chloride, potassium chloride, potassium chloride with lidocaine, potassium chloride, acetaminophen, acetaminophen, naloxone, melatonin, senna-docusate, polyethylene glycol, bisacodyl, ondansetron **OR** ondansetron,  "HYDROcodone-acetaminophen, HYDROmorphone, prochlorperazine **OR** prochlorperazine **OR** prochlorperazine, ketorolac         Allergies:     Allergies   Allergen Reactions     Penicillins      \"severe migraines\"            Social History:   Amaya Huff  reports that she has never smoked. She has never used smokeless tobacco. She reports that she drinks alcohol. She reports that she does not use illicit drugs.          Family History:   Amaya Huff family history includes Alcohol/Drug (age of onset: 30) in her daughter; C.A.D. in her mother; Circulatory in her maternal aunt and maternal grandmother; Parkinsonism (age of onset: 80) in her mother.          Review of Systems:   The 10 point Review of Systems is negative other than noted in the HPI.  Nausea but no emesis.  No fevers or chills.  Had been having relatively normal bowel function.           Physical Exam:   Blood pressure 129/68, pulse 72, temperature 98.2  F (36.8  C), temperature source Oral, resp. rate 18, height 1.676 m (5' 6\"), weight 66.6 kg (146 lb 13.2 oz), SpO2 95 %, not currently breastfeeding.  146 lbs 13.22 oz    Thin healthy-appearing female in no distress.  Patient has a pleasant affect and communicates well.   Pupils equal round and reactive to light.   No cervical lymphadenopathy or thyromegaly.   Lung fields clear, breathing comfortably.   Heart normal sinus rhythm.  No murmurs rubs or gallops.  Abdomen soft, nontender, nondistended.  Lower transverse abdominal scar consistent with previous  sections.  Skin warm, dry.  No obvious rashes or lesions.           Data:   All new lab and imaging data was reviewed, including labs, admission notes, and personal review of the CT images.  Recent Labs   Lab Test  17   0750  17   1638  14   0932   WBC  5.2  9.2  4.8   HGB  10.9*  14.1  12.3   MCV  89  86  85   PLT  182  274  222   INR   --    --   0.96      Recent Labs   Lab Test  17   0750  17   1638 "   NA  144  141   POTASSIUM  3.9  3.6   CHLORIDE  111*  104   CO2  25  25   BUN  13  12   CR  0.78  0.68   ANIONGAP  8  12   SHILPA  8.2*  10.2*   GLC  95  118*

## 2017-05-23 NOTE — ED NOTES
Pt was already on ED cart and was brought to floor via ERT. Pt's belongings were also transported with the pt.

## 2017-05-24 VITALS
HEART RATE: 66 BPM | RESPIRATION RATE: 18 BRPM | SYSTOLIC BLOOD PRESSURE: 130 MMHG | TEMPERATURE: 97.5 F | DIASTOLIC BLOOD PRESSURE: 69 MMHG | OXYGEN SATURATION: 100 % | HEIGHT: 66 IN | WEIGHT: 150.8 LBS | BODY MASS INDEX: 24.23 KG/M2

## 2017-05-24 PROCEDURE — 25000132 ZZH RX MED GY IP 250 OP 250 PS 637: Performed by: INTERNAL MEDICINE

## 2017-05-24 PROCEDURE — 99238 HOSP IP/OBS DSCHRG MGMT 30/<: CPT | Performed by: INTERNAL MEDICINE

## 2017-05-24 RX ADMIN — METOPROLOL TARTRATE 25 MG: 25 TABLET, FILM COATED ORAL at 08:54

## 2017-05-24 NOTE — PLAN OF CARE
Problem: Goal Outcome Summary  Goal: Goal Outcome Summary  Outcome: Improving  VSS on RA. Tolerating full liquid diet. Adequate I&O. Bowel sounds present, passing flatus. Denies pain. Up independently, walked in halls. Plans to d/c home today 5/24.

## 2017-05-24 NOTE — DISCHARGE SUMMARY
[facility]    Discharge Summary  Hospitalist    Date of Admission:  5/22/2017  Date of Discharge:  5/24/2017  Discharging Provider: Terence Park  Date of Service (when I saw the patient): 05/24/17    Discharge Diagnoses   Small bowel obstruction, resolved    History of Present Illness   Amaya Huff is a 66 year old female with a past medical history of mild coronary artery disease/hyperlipidemia who presents with abdominal pain, found to have a mid small bowel obstruction.  See H & P for detail.     Hospital Course   Amaya Huff was admitted on 5/22/2017.  The following problems were addressed during her hospitalization:    Small bowel obstruction: resolved  Patient with prior hx ofC-sections and fibroid laparoscopic surgery. Colonoscopy from 2012 was normal. No obvious malignancy on CT.  This was managed conservatively with bowel rest, IVF and analgesics.  This has now resolved and patient is tolerated diet. She was also seen by gen surgery during this hospital admission.     Terence Park    Significant Results and Procedures   See imaging below.     Pending Results     Unresulted Labs Ordered in the Past 30 Days of this Admission     No orders found from 3/23/2017 to 5/23/2017.          Code Status   Full Code       Primary Care Physician   Carline Palm    Physical Exam   Temp: 97.5  F (36.4  C) Temp src: Oral BP: 130/69 Pulse: 66 Heart Rate: 68 Resp: 18 SpO2: 100 % O2 Device: None (Room air)    Vitals:    05/22/17 2029 05/23/17 0609 05/24/17 0537   Weight: 65 kg (143 lb 4.8 oz) 66.6 kg (146 lb 13.2 oz) 68.4 kg (150 lb 12.8 oz)     Vital Signs with Ranges  Temp:  [97.3  F (36.3  C)-98.2  F (36.8  C)] 97.5  F (36.4  C)  Pulse:  [66] 66  Heart Rate:  [65-68] 68  Resp:  [18] 18  BP: (127-134)/(62-71) 130/69  SpO2:  [96 %-100 %] 100 %  I/O last 3 completed shifts:  In: 3197 [P.O.:780; I.V.:2417]  Out: 2200 [Urine:2200]    Gen: alert, awake and no apparent distress  CVS:  "regular rate and rhythm  Resp: clear to ausculation bilaterally, no wheezing  Abd: soft, NT, and non-distended    Discharge Disposition   Discharged to home  Condition at discharge: Stable    Consultations This Hospital Stay   SURGERY GENERAL IP CONSULT    Time Spent on this Encounter   ITerence, personally saw the patient today and spent 20 minutes discharging this patient.    Discharge Orders     Follow-up and recommended labs and tests    Follow up with primary care provider.  No follow up labs or test are needed.     Activity   Your activity upon discharge: activity as tolerated     Full Code     Diet   Follow this diet upon discharge:     Regular Diet Adult       Discharge Medications   Current Discharge Medication List      CONTINUE these medications which have NOT CHANGED    Details   metoprolol (LOPRESSOR) 25 MG tablet Take 1 tablet (25 mg) by mouth 2 times daily  Qty: 180 tablet, Refills: 3    Associated Diagnoses: Heart palpitations; Coronary artery disease; Chest pain      atorvastatin (LIPITOR) 20 MG tablet Take 1 tablet (20 mg) by mouth daily  Qty: 90 tablet, Refills: 3    Associated Diagnoses: Hyperlipidemia LDL goal <130      aspirin 81 MG tablet Take 81 mg by mouth daily      Probiotic Product (PROBIOTIC DAILY PO) Take 1 tablet by mouth At Bedtime       Cholecalciferol (VITAMIN D3 PO) Take 2,000 Units by mouth daily       Calcium Carbonate-Vitamin D (CALCIUM + D PO) Take 1 chew tab by mouth 2 times daily       Omega-3 Fatty Acids (OMEGA-3 FISH OIL PO) Take 1 g by mouth 2 times daily (with meals)            Allergies   Allergies   Allergen Reactions     Penicillins      \"severe migraines\"     Data   Most Recent 3 CBC's:  Recent Labs   Lab Test  05/23/17   0750  05/22/17   1638  03/18/14   0932   WBC  5.2  9.2  4.8   HGB  10.9*  14.1  12.3   MCV  89  86  85   PLT  182  274  222      Most Recent 3 BMP's:  Recent Labs   Lab Test  05/23/17   0750  05/22/17   1638  12/23/16   0954   NA  144  " 141  140   POTASSIUM  3.9  3.6  3.9   CHLORIDE  111*  104  104   CO2  25  25  33*   BUN  13  12  13   CR  0.78  0.68  0.80   ANIONGAP  8  12  3   SHILPA  8.2*  10.2*  9.4   GLC  95  118*  94     Most Recent 2 LFT's:  Recent Labs   Lab Test  05/22/17   1638  12/23/16   0954   AST  29  22   ALT  32  30   ALKPHOS  88  73   BILITOTAL  0.6  0.6     Most Recent INR's and Anticoagulation Dosing History:  Anticoagulation Dose History     Recent Dosing and Labs Latest Ref Rng & Units 3/18/2014    INR 0.86 - 1.14 0.96        Most Recent 3 Troponin's:No lab results found.  Most Recent Cholesterol Panel:  Recent Labs   Lab Test  12/23/16   0954   CHOL  175   LDL  73   HDL  78   TRIG  121     Most Recent 6 Bacteria Isolates From Any Culture (See EPIC Reports for Culture Details):No lab results found.  Most Recent TSH, T4 and A1c Labs:No lab results found.  Results for orders placed or performed during the hospital encounter of 05/22/17   CT Abdomen Pelvis w Contrast    Narrative    CT ABDOMEN AND PELVIS WITH CONTRAST   5/22/2017 5:41 PM      HISTORY: Generalized abdominal pain. Low back pain. Nausea.    TECHNIQUE: CT abdomen and pelvis with intravenous contrast. Radiation  dose for this scan was reduced using automated exposure control,  adjustment of the mA and/or kV according to patient size, or iterative  reconstruction technique. 70mL Isovue-370.    COMPARISON: None.    FINDINGS:  Abdomen: There is minimal dependent atelectasis at the lung bases. The  heart size is normal. There is a small cyst in the right lobe of the   liver medially. 1.5 cm cyst in the left lobe of the liver. The spleen,  gallbladder, pancreas, adrenal glands are normal in appearance. There  are two tiny cysts in the upper pole of the right kidney. There is a  0.5 cm stone in the lower pole of the left kidney. There is no  abdominal or pelvic lymph node enlargement. There is atherosclerotic  calcification of the aorta and its branches. No  aneurysm.    Pelvis: Proximal and mid small bowel are dilated. Distal small bowel  and colon are nondilated. Transition point is not certain. There are  sigmoid diverticula without acute diverticulitis. No free  intraperitoneal gas or fluid. Uterus and adnexa appear normal.  Degenerative disease in the spine.      Impression    IMPRESSION:  1. Mid small bowel obstruction of uncertain etiology.  2. Sigmoid diverticula without acute diverticulitis.  3. Left renal stone. No ureteral stone or hydronephrosis.    KIKI SARGENT MD   US Abdomen Limited    Narrative    ULTRASOUND ABDOMEN LIMITED   5/23/2017 4:23 PM     HISTORY: Epigastric and right upper quadrant pain, evaluate for  gallbladder disease.    COMPARISON: CT 5/22/2017.    FINDINGS: Visualized portions of the pancreas are unremarkable. In the  left lobe of the liver, adjacent to the falciform ligament, there is a  simple-appearing cyst which was also seen on the CT scan. No biliary  dilation or concerning liver mass. The right kidney appears normal.    No gallbladder wall thickening, pericholecystic fluid, or tenderness  with direct transducer pressure over the gallbladder. No gallstones  are identified. The common bile duct measures less than 3 mm.      Impression    IMPRESSION: Normal-appearing gallbladder.    PARADISE ABDI MD

## 2017-05-25 ENCOUNTER — TELEPHONE (OUTPATIENT)
Dept: FAMILY MEDICINE | Facility: CLINIC | Age: 67
End: 2017-05-25

## 2017-05-25 NOTE — TELEPHONE ENCOUNTER
ED / Discharge Outreach Protocol    Patient Contact    Attempt # 1    Was call answered?  No.  Left message on voicemail with information to call me back.  Fide Cisneros RN      Follow-up and recommended labs and tests    Follow up with primary care provider.  No follow up labs or test are needed.      Activity   Your activity upon discharge: activity as tolerated      Full Code      Diet   Follow this diet upon discharge:     Regular Diet Adult

## 2017-05-25 NOTE — TELEPHONE ENCOUNTER
Pt returned call-  Hospital follow up at [facility] for 6/5 with Dr. Palm  Pt is going to be out of town until then- spoke with  At discharge and they were  Okay with her waiting that long to follow up with PCP

## 2017-06-01 NOTE — TELEPHONE ENCOUNTER
ED/Discharge Protocol  Upcoming appt  Now out of town  Will f/u at appt with PCP  Dona BRIDGES RN

## 2017-06-03 ENCOUNTER — HEALTH MAINTENANCE LETTER (OUTPATIENT)
Age: 67
End: 2017-06-03

## 2017-06-05 ENCOUNTER — OFFICE VISIT (OUTPATIENT)
Dept: FAMILY MEDICINE | Facility: CLINIC | Age: 67
End: 2017-06-05
Payer: COMMERCIAL

## 2017-06-05 VITALS
HEIGHT: 66 IN | OXYGEN SATURATION: 99 % | TEMPERATURE: 97 F | HEART RATE: 70 BPM | WEIGHT: 139 LBS | BODY MASS INDEX: 22.34 KG/M2 | SYSTOLIC BLOOD PRESSURE: 130 MMHG | DIASTOLIC BLOOD PRESSURE: 75 MMHG

## 2017-06-05 DIAGNOSIS — R00.2 HEART PALPITATIONS: ICD-10-CM

## 2017-06-05 DIAGNOSIS — K56.609 SBO (SMALL BOWEL OBSTRUCTION) (H): Primary | ICD-10-CM

## 2017-06-05 DIAGNOSIS — I25.10 MILD CAD: ICD-10-CM

## 2017-06-05 DIAGNOSIS — K59.09 CHRONIC CONSTIPATION: ICD-10-CM

## 2017-06-05 LAB
BASOPHILS # BLD AUTO: 0 10E9/L (ref 0–0.2)
BASOPHILS NFR BLD AUTO: 0.7 %
DIFFERENTIAL METHOD BLD: NORMAL
EOSINOPHIL # BLD AUTO: 0.1 10E9/L (ref 0–0.7)
EOSINOPHIL NFR BLD AUTO: 1.3 %
ERYTHROCYTE [DISTWIDTH] IN BLOOD BY AUTOMATED COUNT: 12.3 % (ref 10–15)
HCT VFR BLD AUTO: 37.4 % (ref 35–47)
HGB BLD-MCNC: 12.2 G/DL (ref 11.7–15.7)
LYMPHOCYTES # BLD AUTO: 1.5 10E9/L (ref 0.8–5.3)
LYMPHOCYTES NFR BLD AUTO: 33.4 %
MCH RBC QN AUTO: 28.5 PG (ref 26.5–33)
MCHC RBC AUTO-ENTMCNC: 32.6 G/DL (ref 31.5–36.5)
MCV RBC AUTO: 87 FL (ref 78–100)
MONOCYTES # BLD AUTO: 0.4 10E9/L (ref 0–1.3)
MONOCYTES NFR BLD AUTO: 8.4 %
NEUTROPHILS # BLD AUTO: 2.6 10E9/L (ref 1.6–8.3)
NEUTROPHILS NFR BLD AUTO: 56.2 %
PLATELET # BLD AUTO: 267 10E9/L (ref 150–450)
RBC # BLD AUTO: 4.28 10E12/L (ref 3.8–5.2)
WBC # BLD AUTO: 4.6 10E9/L (ref 4–11)

## 2017-06-05 PROCEDURE — 86003 ALLG SPEC IGE CRUDE XTRC EA: CPT | Mod: 59 | Performed by: INTERNAL MEDICINE

## 2017-06-05 PROCEDURE — 36415 COLL VENOUS BLD VENIPUNCTURE: CPT | Performed by: INTERNAL MEDICINE

## 2017-06-05 PROCEDURE — 85025 COMPLETE CBC W/AUTO DIFF WBC: CPT | Performed by: INTERNAL MEDICINE

## 2017-06-05 PROCEDURE — 99495 TRANSJ CARE MGMT MOD F2F 14D: CPT | Performed by: INTERNAL MEDICINE

## 2017-06-05 RX ORDER — METOPROLOL TARTRATE 25 MG/1
25 TABLET, FILM COATED ORAL 2 TIMES DAILY
Qty: 180 TABLET | Refills: 3 | Status: SHIPPED | OUTPATIENT
Start: 2017-06-05 | End: 2019-02-06

## 2017-06-05 NOTE — PROGRESS NOTES
SUBJECTIVE:                                                    Amaya Huff is a 66 year old female who presents to clinic today for the following health issues:    Patient went to [facility] thinking that she has back issue  She did have BM  pain was severe, in ED, found to have SBO  Did have BM on  small  She was seen by Dr Rosario also in surgery  Appetite ok  No fever  She did travel to [place]  to   She did have BM last night  It is thin and small  She is leaving for [place] tomorrow       Hospital Follow-up Visit:    Hospital/Nursing Home/IP Rehab Facility: [facility]  Date of Admission: 2017  Date of Discharge: 2017  Reason(s) for Admission: Small bowel obstruction, resolved            Problems taking medications regularly:  None       Medication changes since discharge: None       Problems adhering to non-medication therapy:  None    Summary of hospitalization:  [facility] discharge summary reviewed  Diagnostic Tests/Treatments reviewed.  Follow up needed: none  Other Healthcare Providers Involved in Patient s Care:         None  Update since discharge: improved.     Post Discharge Medication Reconciliation: discharge medications reconciled, continue medications without change.  Plan of care communicated with patient                  Problem list and histories reviewed & adjusted, as indicated.  Additional history: as documented    Patient Active Problem List   Diagnosis     Abdominal pain     Premature beats     Mitral valve disorder     Advanced directives, counseling/discussion     Hyperlipidemia with target LDL less than 130     Mild CAD     SBO (small bowel obstruction) (H)     Past Surgical History:   Procedure Laterality Date     C NONSPECIFIC PROCEDURE      S/P T&A     C NONSPECIFIC PROCEDURE      LASER FIBROIDS     C NONSPECIFIC PROCEDURE       x2     COLONOSCOPY  2012    Procedure:COLONOSCOPY; COLONOSCOPY; Surgeon:GINGER PARKS  "MARTINA; Location: GI     CORONARY ANGIOGRAPHY ADULT ORDER  3/18/14    3/18/14- Mild to moderate CAD, no interventions, treat medically.     ENT SURGERY  tonsils     GYN SURGERY  c sections       Social History   Substance Use Topics     Smoking status: Never Smoker     Smokeless tobacco: Never Used     Alcohol use 0.0 oz/week     0 Standard drinks or equivalent per week      Comment: 1 glass wine/day     Family History   Problem Relation Age of Onset     C.A.D. Mother      Circulatory Maternal Grandmother      MVP     Circulatory Maternal Aunt      MVP     Alcohol/Drug Daughter 30     Parkinsonism Mother 80         Current Outpatient Prescriptions   Medication Sig Dispense Refill     metoprolol (LOPRESSOR) 25 MG tablet Take 1 tablet (25 mg) by mouth 2 times daily 180 tablet 3     atorvastatin (LIPITOR) 20 MG tablet Take 1 tablet (20 mg) by mouth daily 90 tablet 3     aspirin 81 MG tablet Take 81 mg by mouth daily       Probiotic Product (PROBIOTIC DAILY PO) Take 1 tablet by mouth At Bedtime        Cholecalciferol (VITAMIN D3 PO) Take 2,000 Units by mouth daily        Calcium Carbonate-Vitamin D (CALCIUM + D PO) Take 1 chew tab by mouth 2 times daily        Omega-3 Fatty Acids (OMEGA-3 FISH OIL PO) Take 1 g by mouth 2 times daily (with meals)          Reviewed and updated as needed this visit by clinical staff  Tobacco  Allergies  Meds  Problems       Reviewed and updated as needed this visit by Provider  Allergies  Meds  Problems         ROS:  Constitutional, HEENT, cardiovascular, pulmonary, gi and gu systems are negative, except as otherwise noted.    OBJECTIVE:                                                    /75 (BP Location: Left arm, Cuff Size: Adult Regular)  Pulse 70  Temp 97  F (36.1  C) (Oral)  Ht 5' 6\" (1.676 m)  Wt 139 lb (63 kg)  SpO2 99%  BMI 22.44 kg/m2  Body mass index is 22.44 kg/(m^2).  GENERAL: healthy, alert and no distress  NECK: no adenopathy, no asymmetry, masses, or " scars and thyroid normal to palpation  RESP: lungs clear to auscultation - no rales, rhonchi or wheezes  CV: regular rate and rhythm, normal S1 S2, no S3 or S4, no murmur, click or rub, no peripheral edema and peripheral pulses strong  ABDOMEN: soft, nontender, no hepatosplenomegaly, no masses and bowel sounds normal  MS: no gross musculoskeletal defects noted, no edema         ASSESSMENT/PLAN:                                                        I reviewed the patient's CT abdomen and lab findings  She had become anemic by the time she left the hospital    That could be from IV fluids    I also discussed with her about the issues from probiotics  And calcium  Calcium can be taken in the diet form    Heart palpitations are stable  Coronary disease is stable      ICD-10-CM    1. SBO (small bowel obstruction) (H) K56.69 CBC with platelets differential     Allergy adult food panel     GASTROENTEROLOGY ADULT REF PROCEDURE ONLY   2. Heart palpitations R00.2 metoprolol (LOPRESSOR) 25 MG tablet   3. Mild CAD I25.10 Allergy adult food panel   4. Chronic constipation K59.09 GASTROENTEROLOGY ADULT REF PROCEDURE ONLY       She does need a colonoscopy again    Patient Instructions     Stop Calcium     Stop Probiotics for now    Raspberries 1 cup 8.0   Pear, with skin 1 medium 5.5   Apple, with skin 1 medium 4.4   Strawberries (halves) 1 1/4 cup 3.8   Banana 1 medium 3.1   Orange 1 medium 3.1   Figs, dried 2 medium 1.6   Raisins 2 tablespoons 1.0     Grains, cereal & pasta Serving size Total fiber (grams)*   Spaghetti, whole-wheat, cooked 1 cup 6.2   Barley, pearled, cooked 1 cup 6.0   Bran flakes 3/4 cup 5.3   Oat bran muffin 1 medium 5.2   Oatmeal, quick, regular or instant, cooked 1 cup 4.0   Popcorn, air-popped 3 cups 3.5   Brown rice, cooked 1 cup 3.5   Bread, rye 1 slice 1.9   Bread, whole-wheat or multigrain 1 slice 1.9     Legumes, nuts & seeds Serving size Total fiber (grams)*   Split peas, cooked 1 cup 16.3    Lentils, cooked 1 cup 15.6   Black beans, cooked 1 cup 15.0   Lima beans, cooked 1 cup 13.2   Baked beans, vegetarian, canned, cooked 1 cup 10.4   Sunflower seed kernels 1/4 cup 3.9   Almonds 1 ounce (23 nuts) 3.5   Pistachio nuts 1 ounce (49 nuts) 2.9   Pecans 1 ounce (19 halves) 2.7     Vegetables Serving size Total fiber (grams)*   Artichoke, cooked 1 medium 10.3   Peas, cooked 1 cup 8.8   Broccoli, boiled 1 cup 5.1   Turnip greens, boiled 1 cup 5.0   Sweet corn, cooked 1 cup 4.2   Casselberry sprouts, cooked 1 cup 4.1   Potato, with skin, baked 1 medium 2.9   Tomato paste 1/4 cup 2.7   Carrot, raw 1 medium 1.7   *Fiber content can vary between brands          Carline Palm MD  [facility]

## 2017-06-05 NOTE — MR AVS SNAPSHOT
After Visit Summary   6/5/2017    Amaya Huff    MRN: [id number]           Patient Information     Date Of Birth          1950        Visit Information        Provider Department      6/5/2017 2:30 PM Carline Palm MD [facility]        Today's Diagnoses     SBO (small bowel obstruction) (H)    -  1    Heart palpitations        Chest pain        Mild CAD          Care Instructions    Stop Calcium     Stop Probiotics for now    Raspberries 1 cup 8.0   Pear, with skin 1 medium 5.5   Apple, with skin 1 medium 4.4   Strawberries (halves) 1 1/4 cup 3.8   Banana 1 medium 3.1   Orange 1 medium 3.1   Figs, dried 2 medium 1.6   Raisins 2 tablespoons 1.0     Grains, cereal & pasta Serving size Total fiber (grams)*   Spaghetti, whole-wheat, cooked 1 cup 6.2   Barley, pearled, cooked 1 cup 6.0   Bran flakes 3/4 cup 5.3   Oat bran muffin 1 medium 5.2   Oatmeal, quick, regular or instant, cooked 1 cup 4.0   Popcorn, air-popped 3 cups 3.5   Brown rice, cooked 1 cup 3.5   Bread, rye 1 slice 1.9   Bread, whole-wheat or multigrain 1 slice 1.9     Legumes, nuts & seeds Serving size Total fiber (grams)*   Split peas, cooked 1 cup 16.3   Lentils, cooked 1 cup 15.6   Black beans, cooked 1 cup 15.0   Lima beans, cooked 1 cup 13.2   Baked beans, vegetarian, canned, cooked 1 cup 10.4   Sunflower seed kernels 1/4 cup 3.9   Almonds 1 ounce (23 nuts) 3.5   Pistachio nuts 1 ounce (49 nuts) 2.9   Pecans 1 ounce (19 halves) 2.7     Vegetables Serving size Total fiber (grams)*   Artichoke, cooked 1 medium 10.3   Peas, cooked 1 cup 8.8   Broccoli, boiled 1 cup 5.1   Turnip greens, boiled 1 cup 5.0   Sweet corn, cooked 1 cup 4.2   [place] sprouts, cooked 1 cup 4.1   Potato, with skin, baked 1 medium 2.9   Tomato paste 1/4 cup 2.7   Carrot, raw 1 medium 1.7   *Fiber content can vary between brands                Follow-ups after your visit        Your next 10 appointments already scheduled     Aug 11, 2017 11:15  "AM CDT   Return Visit with Josh Simental MD   [facility] PHYSICIANS [facility] AT [place] ([facility])    [address]  [place] [address] [phone]              Who to contact     If you have questions or need follow up information about today's clinic visit or your schedule please contact [facility] directly at [phone].  Normal or non-critical lab and imaging results will be communicated to you by [employer] [facility] [employer], letter or phone within 4 business days after the clinic has received the results. If you do not hear from us within 7 days, please contact the clinic through [employer] or phone. If you have a critical or abnormal lab result, we will notify you by phone as soon as possible.  Submit refill requests through [employer] or call your pharmacy and they will forward the refill request to us. Please allow 3 business days for your refill to be completed.          Additional Information About Your Visit        [employer] Information     [employer] gives you secure access to your electronic health record. If you see a primary care provider, you can also send messages to your care team and make appointments. If you have questions, please call your primary care clinic.  If you do not have a primary care provider, please call [phone] and they will assist you.        Care EveryWhere ID     This is your Care EveryWhere ID. This could be used by other organizations to access your [facility] medical records  [id number]        Your Vitals Were     Pulse Temperature Height Pulse Oximetry BMI (Body Mass Index)       70 97  F (36.1  C) (Oral) 5' 6\" (1.676 m) 99% 22.44 kg/m2        Blood Pressure from Last 3 Encounters:   06/05/17 130/75   05/24/17 130/69   12/23/16 136/66    Weight from Last 3 Encounters:   06/05/17 139 lb (63 kg)   05/24/17 150 lb 12.8 oz (68.4 kg)   12/23/16 139 lb (63 kg)              Today, you had the following     No orders found for display       Primary " Care Provider Office Phone # Fax #    Carline Palm [phone]       [facility] [address] [address]        Thank you!     Thank you for choosing [facility]  for your care. Our goal is always to provide you with excellent care. Hearing back from our patients is one way we can continue to improve our services. Please take a few minutes to complete the written survey that you may receive in the mail after your visit with us. Thank you!             Your Updated Medication List - Protect others around you: Learn how to safely use, store and throw away your medicines at www.disposemymeds.org.          This list is accurate as of: 6/5/17  2:55 PM.  Always use your most recent med list.                   Brand Name Dispense Instructions for use    aspirin 81 MG tablet      Take 81 mg by mouth daily       atorvastatin 20 MG tablet    LIPITOR    90 tablet    Take 1 tablet (20 mg) by mouth daily       CALCIUM + D PO      Take 1 chew tab by mouth 2 times daily       metoprolol 25 MG tablet    LOPRESSOR    180 tablet    Take 1 tablet (25 mg) by mouth 2 times daily       OMEGA-3 FISH OIL PO      Take 1 g by mouth 2 times daily (with meals)       PROBIOTIC DAILY PO      Take 1 tablet by mouth At Bedtime       VITAMIN D3 PO      Take 2,000 Units by mouth daily

## 2017-06-05 NOTE — NURSING NOTE
"Chief Complaint   Patient presents with     Hospital F/U       Initial /75 (BP Location: Left arm, Cuff Size: Adult Regular)  Pulse 70  Temp 97  F (36.1  C) (Oral)  Ht 5' 6\" (1.676 m)  Wt 139 lb (63 kg)  SpO2 99%  BMI 22.44 kg/m2 Estimated body mass index is 22.44 kg/(m^2) as calculated from the following:    Height as of this encounter: 5' 6\" (1.676 m).    Weight as of this encounter: 139 lb (63 kg).  Medication Reconciliation: complete     Cassie Lucero CMA      "

## 2017-06-05 NOTE — PATIENT INSTRUCTIONS
Stop Calcium     Stop Probiotics for now    Raspberries 1 cup 8.0   Pear, with skin 1 medium 5.5   Apple, with skin 1 medium 4.4   Strawberries (halves) 1 1/4 cup 3.8   Banana 1 medium 3.1   Orange 1 medium 3.1   Figs, dried 2 medium 1.6   Raisins 2 tablespoons 1.0     Grains, cereal & pasta Serving size Total fiber (grams)*   Spaghetti, whole-wheat, cooked 1 cup 6.2   Barley, pearled, cooked 1 cup 6.0   Bran flakes 3/4 cup 5.3   Oat bran muffin 1 medium 5.2   Oatmeal, quick, regular or instant, cooked 1 cup 4.0   Popcorn, air-popped 3 cups 3.5   Brown rice, cooked 1 cup 3.5   Bread, rye 1 slice 1.9   Bread, whole-wheat or multigrain 1 slice 1.9     Legumes, nuts & seeds Serving size Total fiber (grams)*   Split peas, cooked 1 cup 16.3   Lentils, cooked 1 cup 15.6   Black beans, cooked 1 cup 15.0   Lima beans, cooked 1 cup 13.2   Baked beans, vegetarian, canned, cooked 1 cup 10.4   Sunflower seed kernels 1/4 cup 3.9   Almonds 1 ounce (23 nuts) 3.5   Pistachio nuts 1 ounce (49 nuts) 2.9   Pecans 1 ounce (19 halves) 2.7     Vegetables Serving size Total fiber (grams)*   Artichoke, cooked 1 medium 10.3   Peas, cooked 1 cup 8.8   Broccoli, boiled 1 cup 5.1   Turnip greens, boiled 1 cup 5.0   Sweet corn, cooked 1 cup 4.2   [place] sprouts, cooked 1 cup 4.1   Potato, with skin, baked 1 medium 2.9   Tomato paste 1/4 cup 2.7   Carrot, raw 1 medium 1.7   *Fiber content can vary between brands

## 2017-06-06 LAB
CLAM IGE QN: NORMAL KU(A)/L
CODFISH IGE QN: NORMAL KU(A)/L
CORN IGE QN: NORMAL KU(A)/L
COW MILK IGE QN: NORMAL KU/L
EGG WHITE IGE QN: NORMAL KU(A)/L
PEANUT IGE QN: NORMAL KU(A)/L
SCALLOP IGE QN: NORMAL KU(A)/L
SHRIMP IGE QN: NORMAL KU(A)/L
SOYBEAN IGE QN: NORMAL KU(A)/L
WALNUT IGE QN: NORMAL KU(A)/L
WHEAT IGE QN: NORMAL KU(A)/L

## 2017-06-30 ENCOUNTER — SURGERY (OUTPATIENT)
Age: 67
End: 2017-06-30

## 2017-06-30 ENCOUNTER — HOSPITAL ENCOUNTER (OUTPATIENT)
Facility: CLINIC | Age: 67
Discharge: HOME | End: 2017-06-30
Attending: SURGERY | Admitting: SURGERY
Payer: COMMERCIAL

## 2017-06-30 ENCOUNTER — APPOINTMENT (OUTPATIENT)
Dept: SURGERY | Facility: PHYSICIAN GROUP | Age: 67
End: 2017-06-30
Payer: COMMERCIAL

## 2017-06-30 VITALS
RESPIRATION RATE: 18 BRPM | WEIGHT: 135 LBS | OXYGEN SATURATION: 98 % | DIASTOLIC BLOOD PRESSURE: 65 MMHG | SYSTOLIC BLOOD PRESSURE: 126 MMHG | BODY MASS INDEX: 21.79 KG/M2

## 2017-06-30 LAB — COLONOSCOPY: NORMAL

## 2017-06-30 PROCEDURE — 25000128 H RX IP 250 OP 636: Performed by: SURGERY

## 2017-06-30 PROCEDURE — 99153 MOD SED SAME PHYS/QHP EA: CPT | Performed by: SURGERY

## 2017-06-30 PROCEDURE — 45378 DIAGNOSTIC COLONOSCOPY: CPT | Performed by: SURGERY

## 2017-06-30 PROCEDURE — G0121 COLON CA SCRN NOT HI RSK IND: HCPCS | Performed by: SURGERY

## 2017-06-30 PROCEDURE — G0500 MOD SEDAT ENDO SERVICE >5YRS: HCPCS | Performed by: SURGERY

## 2017-06-30 RX ORDER — FENTANYL CITRATE 50 UG/ML
INJECTION, SOLUTION INTRAMUSCULAR; INTRAVENOUS PRN
Status: DISCONTINUED | OUTPATIENT
Start: 2017-06-30 | End: 2017-06-30 | Stop reason: HOSPADM

## 2017-06-30 RX ORDER — ONDANSETRON 2 MG/ML
4 INJECTION INTRAMUSCULAR; INTRAVENOUS
Status: DISCONTINUED | OUTPATIENT
Start: 2017-06-30 | End: 2017-06-30 | Stop reason: HOSPADM

## 2017-06-30 RX ORDER — LIDOCAINE 40 MG/G
CREAM TOPICAL
Status: DISCONTINUED | OUTPATIENT
Start: 2017-06-30 | End: 2017-06-30 | Stop reason: HOSPADM

## 2017-06-30 RX ADMIN — FENTANYL CITRATE 100 MCG: 50 INJECTION, SOLUTION INTRAMUSCULAR; INTRAVENOUS at 10:46

## 2017-06-30 RX ADMIN — MIDAZOLAM HYDROCHLORIDE 1 MG: 1 INJECTION, SOLUTION INTRAMUSCULAR; INTRAVENOUS at 10:55

## 2017-06-30 RX ADMIN — MIDAZOLAM HYDROCHLORIDE 2 MG: 1 INJECTION, SOLUTION INTRAMUSCULAR; INTRAVENOUS at 10:46

## 2017-06-30 RX ADMIN — MIDAZOLAM HYDROCHLORIDE 1 MG: 1 INJECTION, SOLUTION INTRAMUSCULAR; INTRAVENOUS at 11:11

## 2017-06-30 RX ADMIN — FENTANYL CITRATE 50 MCG: 50 INJECTION, SOLUTION INTRAMUSCULAR; INTRAVENOUS at 11:03

## 2017-07-17 ENCOUNTER — TELEPHONE (OUTPATIENT)
Dept: FAMILY MEDICINE | Facility: CLINIC | Age: 67
End: 2017-07-17

## 2017-07-17 DIAGNOSIS — Q43.8 REDUNDANT COLON: Primary | ICD-10-CM

## 2017-07-17 NOTE — TELEPHONE ENCOUNTER
"PCP:    Pt is wondering if she could have a referral to GI to discuss her \"loopy colon\".  Pt was inpatient and seen on 6/5 with PCP.   Pt would like to discuss this with a GI provider, including taking supplement.     Referral pended - more info will need to be added. Thank you.     Yue Garcia RN     "

## 2017-07-17 NOTE — TELEPHONE ENCOUNTER
Called pt and let her know referral was ordered and faxed referral to [facility].    Mitra Alaniz MA

## 2017-07-17 NOTE — TELEPHONE ENCOUNTER
"Reason for Call:  Colonoscopy Follow Up Question     Detailed comments: she has a \"loopy\" colon and would like to talk to someone  About Vitamins and what else she could do    Phone Number Patient can be reached at: Home number on file [phone] (home)    Best Time: anytime    Can we leave a detailed message on this number? YES    Call taken on 7/17/2017 at 12:45 PM by Terry Camargo      "

## 2017-08-11 ENCOUNTER — OFFICE VISIT (OUTPATIENT)
Dept: CARDIOLOGY | Facility: CLINIC | Age: 67
End: 2017-08-11
Attending: INTERNAL MEDICINE
Payer: COMMERCIAL

## 2017-08-11 VITALS
HEART RATE: 55 BPM | HEIGHT: 66 IN | BODY MASS INDEX: 22.34 KG/M2 | WEIGHT: 139 LBS | SYSTOLIC BLOOD PRESSURE: 128 MMHG | DIASTOLIC BLOOD PRESSURE: 76 MMHG

## 2017-08-11 DIAGNOSIS — I25.10 CORONARY ARTERY DISEASE INVOLVING NATIVE CORONARY ARTERY OF NATIVE HEART WITHOUT ANGINA PECTORIS: Primary | ICD-10-CM

## 2017-08-11 DIAGNOSIS — E78.5 HYPERLIPIDEMIA LDL GOAL <130: ICD-10-CM

## 2017-08-11 PROCEDURE — 99214 OFFICE O/P EST MOD 30 MIN: CPT | Performed by: INTERNAL MEDICINE

## 2017-08-11 NOTE — PROGRESS NOTES
HPI and Plan:   HPI and Plan:   REASON FOR VISIT:  Followup for coronary artery disease and palpitations.      HISTORY OF PRESENT ILLNESS:  I had the pleasure of seeing Amaya Huff at the [facility] in [place] this morning.  She is a very pleasant 67-year-old female with a history of mild coronary artery disease, mitral valve prolapse with mild mitral regurgitation and premature ventricular contractions.      We initially saw her  3 yrs ago for evaluation of chest pain.  She underwent stress test which was abnormal.  She later had a CTA which suggested 3-vessel coronary artery disease.  This prompted coronary angiography which demonstrated mild to moderate coronary artery disease.  Her LV filling pressures were severely elevated, thought to be secondary to diastolic dysfunction.  There may have been a component of coronary vasospasm as well.  She was commenced on pretty good medical program including a statin, low-dose aspirin, metoprolol and isosorbide mononitrate. Her symptoms resolved with medical therapy. We subsequently discontinued the Imdur.     Over the past year, she hasn't had recurrent symptoms.  She is currently active and walks 4-5 miles daily.  She denies exertional shortness of breath or chest pain.  She also denies significant palpitations, presyncope or syncope.         IMPRESSION AND PLAN:   1.  Mild coronary artery disease.   2.  History of PVCs, palpitations, resolved.   3.  History of mitral valve prolapse with mild mitral regurgitation.      Ms. Huff is doing quite well from a cardiopulmonary standpoint.  She is active and exercise on a daily basis.  Her risk factors are well controlled. She will continue current medical program.  We will see her in approximately 2 years for followup, but sooner if she develops any new symptoms.      It was a pleasure seeing Ms. Huff in clinic this morning.  I appreciate the opportunity to be part of her care.        "  GUILLERMINA CARIAS MD            Orders Placed This Encounter   Procedures     Follow-Up with Cardiologist     Echocardiogram       No orders of the defined types were placed in this encounter.       There are no discontinued medications.      Encounter Diagnoses   Name Primary?     Hyperlipidemia with target LDL less than 130      Coronary artery disease involving native coronary artery of native heart without angina pectoris Yes       CURRENT MEDICATIONS:  Current Outpatient Prescriptions   Medication Sig Dispense Refill     atorvastatin (LIPITOR) 20 MG tablet Take 1 tablet (20 mg) by mouth daily 90 tablet 0     metoprolol (LOPRESSOR) 25 MG tablet Take 1 tablet (25 mg) by mouth 2 times daily 180 tablet 3     aspirin 81 MG tablet Take 81 mg by mouth daily       Probiotic Product (PROBIOTIC DAILY PO) Take 1 tablet by mouth daily       Cholecalciferol (VITAMIN D3 PO) Take 4,000 Units by mouth daily        Calcium Carbonate-Vitamin D (CALCIUM + D PO) Take 1 chew tab by mouth 2 times daily        Omega-3 Fatty Acids (OMEGA-3 FISH OIL PO) Take 1 g by mouth 2 times daily (with meals)          ALLERGIES     Allergies   Allergen Reactions     Penicillins      \"severe migraines\"       PAST MEDICAL HISTORY:  Past Medical History   Diagnosis Date     Unspecified essential hypertension      Mitral valve disorders      Irregular heartbeat      Coronary artery disease      Mild to moderate CAD noted on 3/18/14 coronary angiogram      History of angina      Other premature beats      Heart palpitations        PAST SURGICAL HISTORY:  Past Surgical History   Procedure Laterality Date     C nonspecific procedure       S/P T&A     C nonspecific procedure       LASER FIBROIDS     C nonspecific procedure        x2     Colonoscopy  2012     Procedure:COLONOSCOPY; COLONOSCOPY; Surgeon:GINGER PARKS; Location: [facility]     Ent surgery  tonsils     Gyn surgery  c sections     Coronary angiography adult order  3/18/14     " "3/18/14- Mild to moderate CAD, no interventions, treat medically.       FAMILY HISTORY:  Family History   Problem Relation Age of Onset     C.A.D. Mother      Circulatory Maternal Grandmother      MVP     Circulatory Maternal Aunt      MVP     Alcohol/Drug Daughter 30     Parkinsonism Mother 80       SOCIAL HISTORY:  History     Social History     Marital Status:      Spouse Name: N/A     Number of Children: N/A     Years of Education: N/A     Social History Main Topics     Smoking status: Never Smoker      Smokeless tobacco: Never Used     Alcohol Use: 0.0 oz/week     0 Standard drinks or equivalent per week      Comment: 1 glass wine/day     Drug Use: No     Sexual Activity:     Partners: Male     Other Topics Concern     Caffeine Concern No     coffee: 2 cups a day     Sleep Concern No     Stress Concern No     Special Diet Yes     little red meat, a lot of veggies & fruit     Exercise Yes     walk and weight lifiting 5-6 x a week     Social History Narrative          had MI 47        Remarried           Review of Systems:  Skin:  Negative       Eyes:  Negative      ENT:  Negative      Respiratory:  Negative       Cardiovascular:  Negative      Gastroenterology: Negative      Genitourinary:  Negative      Musculoskeletal:  Negative      Neurologic:  Negative      Psychiatric:  Negative      Heme/Lymph/Imm:  Negative      Endocrine:  Negative        Physical Exam:  Vitals: /82 mmHg  Pulse 64  Ht 1.676 m (5' 6\")  Wt 64.411 kg (142 lb)  BMI 22.93 kg/m2    Constitutional:  cooperative;in no acute distress        Skin:  warm and dry to the touch;warm and dry to the touch, no apparent skin lesions or masses noted        Head:  normocephalic;normocephalic, no masses or lesions        Eyes:  pupils equal and round        ENT:  no pallor or cyanosis;no pallor or cyanosis, dentition good        Neck:  JVP normal;carotid pulses are full and equal bilaterally;no carotid bruit        Chest:  " "clear to auscultation          Cardiac: regular rhythm;no murmurs, gallops or rubs detected;normal S1 and S2                  Abdomen:  abdomen soft;non-tender;no bruits        Vascular: pulses full and equal;pulses full and equal, no bruits auscultated                                        Extremities and Back:  no edema;no deformities, clubbing, cyanosis, erythema observed              Neurological:  no gross motor deficits;affect appropriate, oriented to time, person and place              CC  Carline Palm MD  [facility]  [address]  [place] [address]                  Orders Placed This Encounter   Procedures     Follow-Up with Cardiologist       Orders Placed This Encounter   Medications     Biotin 3 MG TABS       There are no discontinued medications.      Encounter Diagnoses   Name Primary?     Coronary artery disease involving native coronary artery of native heart without angina pectoris Yes     Hyperlipidemia LDL goal <130        CURRENT MEDICATIONS:  Current Outpatient Prescriptions   Medication Sig Dispense Refill     Biotin 3 MG TABS        metoprolol (LOPRESSOR) 25 MG tablet Take 1 tablet (25 mg) by mouth 2 times daily 180 tablet 3     atorvastatin (LIPITOR) 20 MG tablet Take 1 tablet (20 mg) by mouth daily 90 tablet 3     aspirin 81 MG tablet Take 81 mg by mouth daily       Cholecalciferol (VITAMIN D3 PO) Take 2,000 Units by mouth daily        Omega-3 Fatty Acids (OMEGA-3 FISH OIL PO) Take 1 g by mouth 2 times daily (with meals)        Probiotic Product (PROBIOTIC DAILY PO) Take 1 tablet by mouth At Bedtime        Calcium Carbonate-Vitamin D (CALCIUM + D PO) Take 1 chew tab by mouth 2 times daily          ALLERGIES     Allergies   Allergen Reactions     Penicillins      \"severe migraines\"       PAST MEDICAL HISTORY:  Past Medical History:   Diagnosis Date     Coronary artery disease     Mild to moderate CAD noted on 3/18/14 coronary angiogram      Heart palpitations      " History of angina      Irregular heartbeat      Mitral valve disorder      Other premature beats      Unspecified essential hypertension        PAST SURGICAL HISTORY:  Past Surgical History:   Procedure Laterality Date     C NONSPECIFIC PROCEDURE      S/P T&A     C NONSPECIFIC PROCEDURE      LASER FIBROIDS     C NONSPECIFIC PROCEDURE       x2     COLONOSCOPY  2012    Procedure:COLONOSCOPY; COLONOSCOPY; Surgeon:GINGER PARKS; Location: GI     COLONOSCOPY N/A 2017    Procedure: COLONOSCOPY;  colonoscopy;  Surgeon: Dez Siegel MD;  Location:  GI     CORONARY ANGIOGRAPHY ADULT ORDER  3/18/14    3/18/14- Mild to moderate CAD, no interventions, treat medically.     ENT SURGERY  tonsils     GYN SURGERY  c sections       FAMILY HISTORY:  Family History   Problem Relation Age of Onset     C.A.D. Mother      Parkinsonism Mother 80     Alcohol/Drug Daughter 30     Circulatory Maternal Grandmother      MVP     Circulatory Maternal Aunt      MVP       SOCIAL HISTORY:  Social History     Social History     Marital status:      Spouse name: N/A     Number of children: N/A     Years of education: N/A     Social History Main Topics     Smoking status: Never Smoker     Smokeless tobacco: Never Used     Alcohol use 0.0 oz/week     0 Standard drinks or equivalent per week      Comment: 1 glass wine/day     Drug use: No     Sexual activity: Yes     Partners: Male     Other Topics Concern     Caffeine Concern No     coffee: 2 cups a day     Sleep Concern No     Stress Concern No     Special Diet Yes     little red meat, a lot of veggies & fruit     Exercise Yes     walk and weight lifiting 5-6 x a week     Social History Narrative          had MI 47        Remarried           Review of Systems:  Skin:  Negative       Eyes:  Negative      ENT:  Negative      Respiratory:  Negative       Cardiovascular:  Negative      Gastroenterology: Negative      Genitourinary:  Negative  "     Musculoskeletal:  Negative      Neurologic:  Negative      Psychiatric:  Negative      Heme/Lymph/Imm:  Negative      Endocrine:  Negative        Physical Exam:  Vitals: /76  Pulse 55  Ht 1.676 m (5' 6\")  Wt 63 kg (139 lb)  BMI 22.44 kg/m2    Constitutional:  cooperative;in no acute distress        Skin:  warm and dry to the touch;warm and dry to the touch, no apparent skin lesions or masses noted        Head:  normocephalic;normocephalic, no masses or lesions        Eyes:  pupils equal and round        ENT:  no pallor or cyanosis;no pallor or cyanosis, dentition good        Neck:  JVP normal;carotid pulses are full and equal bilaterally;no carotid bruit        Chest:  clear to auscultation          Cardiac: regular rhythm;no murmurs, gallops or rubs detected;normal S1 and S2                  Abdomen:  abdomen soft;non-tender;no bruits        Vascular: pulses full and equal;pulses full and equal, no bruits auscultated                                        Extremities and Back:  no edema;no deformities, clubbing, cyanosis, erythema observed              Neurological:  no gross motor deficits;affect appropriate, oriented to time, person and place              CC  Josh Simental MD  [address]  [place] [id number]              "

## 2017-08-11 NOTE — LETTER
8/11/2017    Carline Palm MD  [address]  [facility] [address]    RE: Amaya Huff       Dear Colleague,    I had the pleasure of seeing Amaya Huff in the [facility].    HPI and Plan:   REASON FOR VISIT:  Followup for coronary artery disease and palpitations.      I had the pleasure of seeing Amaya Huff at the [facility] in [place] this morning.  She is a very pleasant 67-year-old female with a history of mild coronary artery disease, mitral valve prolapse with mild mitral regurgitation and premature ventricular contractions.      We initially saw her  3 yrs ago for evaluation of chest pain.  She underwent stress test which was abnormal.  She later had a CTA which suggested 3-vessel coronary artery disease.  This prompted coronary angiography which demonstrated mild to moderate coronary artery disease.  Her LV filling pressures were severely elevated, thought to be secondary to diastolic dysfunction.  There may have been a component of coronary vasospasm as well.  She was commenced on pretty good medical program including a statin, low-dose aspirin, metoprolol and isosorbide mononitrate. Her symptoms resolved with medical therapy. We subsequently discontinued the Imdur.     Over the past year, she hasn't had recurrent symptoms.  She is currently active and walks 4-5 miles daily.  She denies exertional shortness of breath or chest pain.  She also denies significant palpitations, presyncope or syncope.         IMPRESSION AND PLAN:   1.  Mild coronary artery disease.   2.  History of PVCs, palpitations, resolved.   3.  History of mitral valve prolapse with mild mitral regurgitation.      Ms. Huff is doing quite well from a cardiopulmonary standpoint.  She is active and exercise on a daily basis.  Her risk factors are well controlled. She will continue current medical program.  We will see her in approximately 2 years for  "followup, but sooner if she develops any new symptoms.      It was a pleasure seeing Ms. Huff in clinic this morning.  I appreciate the opportunity to be part of her care.         GUILLERMINA CARIAS MD            Orders Placed This Encounter   Procedures     Follow-Up with Cardiologist     Echocardiogram       No orders of the defined types were placed in this encounter.       There are no discontinued medications.      Encounter Diagnoses   Name Primary?     Hyperlipidemia with target LDL less than 130      Coronary artery disease involving native coronary artery of native heart without angina pectoris Yes       CURRENT MEDICATIONS:  Current Outpatient Prescriptions   Medication Sig Dispense Refill     atorvastatin (LIPITOR) 20 MG tablet Take 1 tablet (20 mg) by mouth daily 90 tablet 0     metoprolol (LOPRESSOR) 25 MG tablet Take 1 tablet (25 mg) by mouth 2 times daily 180 tablet 3     aspirin 81 MG tablet Take 81 mg by mouth daily       Probiotic Product (PROBIOTIC DAILY PO) Take 1 tablet by mouth daily       Cholecalciferol (VITAMIN D3 PO) Take 4,000 Units by mouth daily        Calcium Carbonate-Vitamin D (CALCIUM + D PO) Take 1 chew tab by mouth 2 times daily        Omega-3 Fatty Acids (OMEGA-3 FISH OIL PO) Take 1 g by mouth 2 times daily (with meals)          ALLERGIES     Allergies   Allergen Reactions     Penicillins      \"severe migraines\"       PAST MEDICAL HISTORY:  Past Medical History   Diagnosis Date     Unspecified essential hypertension      Mitral valve disorders      Irregular heartbeat      Coronary artery disease      Mild to moderate CAD noted on 3/18/14 coronary angiogram      History of angina      Other premature beats      Heart palpitations        PAST SURGICAL HISTORY:  Past Surgical History   Procedure Laterality Date     C nonspecific procedure       S/P T&A     C nonspecific procedure       LASER FIBROIDS     C nonspecific procedure        x2     Colonoscopy  2012     " "Procedure:COLONOSCOPY; COLONOSCOPY; Surgeon:GINGER PARKS; Location: GI     Ent surgery  tonsils     Gyn surgery  c sections     Coronary angiography adult order  3/18/14     3/18/14- Mild to moderate CAD, no interventions, treat medically.       FAMILY HISTORY:  Family History   Problem Relation Age of Onset     C.A.D. Mother      Circulatory Maternal Grandmother      MVP     Circulatory Maternal Aunt      MVP     Alcohol/Drug Daughter 30     Parkinsonism Mother 80       SOCIAL HISTORY:  History     Social History     Marital Status:      Spouse Name: N/A     Number of Children: N/A     Years of Education: N/A     Social History Main Topics     Smoking status: Never Smoker      Smokeless tobacco: Never Used     Alcohol Use: 0.0 oz/week     0 Standard drinks or equivalent per week      Comment: 1 glass wine/day     Drug Use: No     Sexual Activity:     Partners: Male     Other Topics Concern     Caffeine Concern No     coffee: 2 cups a day     Sleep Concern No     Stress Concern No     Special Diet Yes     little red meat, a lot of veggies & fruit     Exercise Yes     walk and weight lifiting 5-6 x a week     Social History Narrative          had MI 47        Remarried           Review of Systems:  Skin:  Negative       Eyes:  Negative      ENT:  Negative      Respiratory:  Negative       Cardiovascular:  Negative      Gastroenterology: Negative      Genitourinary:  Negative      Musculoskeletal:  Negative      Neurologic:  Negative      Psychiatric:  Negative      Heme/Lymph/Imm:  Negative      Endocrine:  Negative        Physical Exam:  Vitals: /82 mmHg  Pulse 64  Ht 1.676 m (5' 6\")  Wt 64.411 kg (142 lb)  BMI 22.93 kg/m2    Constitutional:  cooperative;in no acute distress        Skin:  warm and dry to the touch;warm and dry to the touch, no apparent skin lesions or masses noted        Head:  normocephalic;normocephalic, no masses or lesions        Eyes:  pupils equal and " "round        ENT:  no pallor or cyanosis;no pallor or cyanosis, dentition good        Neck:  JVP normal;carotid pulses are full and equal bilaterally;no carotid bruit        Chest:  clear to auscultation          Cardiac: regular rhythm;no murmurs, gallops or rubs detected;normal S1 and S2                  Abdomen:  abdomen soft;non-tender;no bruits        Vascular: pulses full and equal;pulses full and equal, no bruits auscultated                                        Extremities and Back:  no edema;no deformities, clubbing, cyanosis, erythema observed              Neurological:  no gross motor deficits;affect appropriate, oriented to time, person and place            CC  Carline Palm MD  [facility]  [address]  [place] [address]      Orders Placed This Encounter   Procedures     Follow-Up with Cardiologist       Orders Placed This Encounter   Medications     Biotin 3 MG TABS       There are no discontinued medications.      Encounter Diagnoses   Name Primary?     Coronary artery disease involving native coronary artery of native heart without angina pectoris Yes     Hyperlipidemia LDL goal <130        CURRENT MEDICATIONS:  Current Outpatient Prescriptions   Medication Sig Dispense Refill     Biotin 3 MG TABS        metoprolol (LOPRESSOR) 25 MG tablet Take 1 tablet (25 mg) by mouth 2 times daily 180 tablet 3     atorvastatin (LIPITOR) 20 MG tablet Take 1 tablet (20 mg) by mouth daily 90 tablet 3     aspirin 81 MG tablet Take 81 mg by mouth daily       Cholecalciferol (VITAMIN D3 PO) Take 2,000 Units by mouth daily        Omega-3 Fatty Acids (OMEGA-3 FISH OIL PO) Take 1 g by mouth 2 times daily (with meals)        Probiotic Product (PROBIOTIC DAILY PO) Take 1 tablet by mouth At Bedtime        Calcium Carbonate-Vitamin D (CALCIUM + D PO) Take 1 chew tab by mouth 2 times daily          ALLERGIES     Allergies   Allergen Reactions     Penicillins      \"severe migraines\"       PAST MEDICAL " HISTORY:  Past Medical History:   Diagnosis Date     Coronary artery disease     Mild to moderate CAD noted on 3/18/14 coronary angiogram      Heart palpitations      History of angina      Irregular heartbeat      Mitral valve disorder      Other premature beats      Unspecified essential hypertension        PAST SURGICAL HISTORY:  Past Surgical History:   Procedure Laterality Date     C NONSPECIFIC PROCEDURE      S/P T&A     C NONSPECIFIC PROCEDURE      LASER FIBROIDS     C NONSPECIFIC PROCEDURE       x2     COLONOSCOPY  2012    Procedure:COLONOSCOPY; COLONOSCOPY; Surgeon:GINGER PARKS; Location: GI     COLONOSCOPY N/A 2017    Procedure: COLONOSCOPY;  colonoscopy;  Surgeon: Dez Siegel MD;  Location:  GI     CORONARY ANGIOGRAPHY ADULT ORDER  3/18/14    3/18/14- Mild to moderate CAD, no interventions, treat medically.     ENT SURGERY  tonsils     GYN SURGERY  c sections       FAMILY HISTORY:  Family History   Problem Relation Age of Onset     C.A.D. Mother      Parkinsonism Mother 80     Alcohol/Drug Daughter 30     Circulatory Maternal Grandmother      MVP     Circulatory Maternal Aunt      MVP       SOCIAL HISTORY:  Social History     Social History     Marital status:      Spouse name: N/A     Number of children: N/A     Years of education: N/A     Social History Main Topics     Smoking status: Never Smoker     Smokeless tobacco: Never Used     Alcohol use 0.0 oz/week     0 Standard drinks or equivalent per week      Comment: 1 glass wine/day     Drug use: No     Sexual activity: Yes     Partners: Male     Other Topics Concern     Caffeine Concern No     coffee: 2 cups a day     Sleep Concern No     Stress Concern No     Special Diet Yes     little red meat, a lot of veggies & fruit     Exercise Yes     walk and weight lifiting 5-6 x a week     Social History Narrative          had MI 47        Remarried           Review of Systems:  Skin:   "Negative       Eyes:  Negative      ENT:  Negative      Respiratory:  Negative       Cardiovascular:  Negative      Gastroenterology: Negative      Genitourinary:  Negative      Musculoskeletal:  Negative      Neurologic:  Negative      Psychiatric:  Negative      Heme/Lymph/Imm:  Negative      Endocrine:  Negative        Physical Exam:  Vitals: /76  Pulse 55  Ht 1.676 m (5' 6\")  Wt 63 kg (139 lb)  BMI 22.44 kg/m2    Constitutional:  cooperative;in no acute distress        Skin:  warm and dry to the touch;warm and dry to the touch, no apparent skin lesions or masses noted        Head:  normocephalic;normocephalic, no masses or lesions        Eyes:  pupils equal and round        ENT:  no pallor or cyanosis;no pallor or cyanosis, dentition good        Neck:  JVP normal;carotid pulses are full and equal bilaterally;no carotid bruit        Chest:  clear to auscultation          Cardiac: regular rhythm;no murmurs, gallops or rubs detected;normal S1 and S2                  Abdomen:  abdomen soft;non-tender;no bruits        Vascular: pulses full and equal;pulses full and equal, no bruits auscultated                                        Extremities and Back:  no edema;no deformities, clubbing, cyanosis, erythema observed              Neurological:  no gross motor deficits;affect appropriate, oriented to time, person and place        Thank you for allowing me to participate in the care of your patient.    Sincerely,     Josh Simental MD     [facility]      "

## 2017-08-11 NOTE — MR AVS SNAPSHOT
After Visit Summary   8/11/2017    Amaya Huff    MRN: [id number]           Patient Information     Date Of Birth          1950        Visit Information        Provider Department      8/11/2017 11:15 AM Josh Simental MD [facility] [place]        Today's Diagnoses     Coronary artery disease involving native coronary artery of native heart without angina pectoris    -  1    Hyperlipidemia LDL goal <130           Follow-ups after your visit        Additional Services     Follow-Up with Cardiologist                 Future tests that were ordered for you today     Open Future Orders        Priority Expected Expires Ordered    Follow-Up with Cardiologist Routine 8/11/2019 9/10/2019 8/11/2017            Who to contact     If you have questions or need follow up information about today's clinic visit or your schedule please contact [facility] directly at [phone].  Normal or non-critical lab and imaging results will be communicated to you by JamHubhart, letter or phone within 4 business days after the clinic has received the results. If you do not hear from us within 7 days, please contact the clinic through JamHubhart or phone. If you have a critical or abnormal lab result, we will notify you by phone as soon as possible.  Submit refill requests through [employer] or call your pharmacy and they will forward the refill request to us. Please allow 3 business days for your refill to be completed.          Additional Information About Your Visit        MyChart Information     [employer] gives you secure access to your electronic health record. If you see a primary care provider, you can also send messages to your care team and make appointments. If you have questions, please call your primary care clinic.  If you do not have a primary care provider, please call [phone] and they will assist you.        Care EveryWhere ID      "This is your Care EveryWhere ID. This could be used by other organizations to access your [place] medical records  [id number]        Your Vitals Were     Pulse Height BMI (Body Mass Index)             55 1.676 m (5' 6\") 22.44 kg/m2          Blood Pressure from Last 3 Encounters:   08/11/17 128/76   06/30/17 126/65   06/05/17 130/75    Weight from Last 3 Encounters:   08/11/17 63 kg (139 lb)   06/30/17 61.2 kg (135 lb)   06/05/17 63 kg (139 lb)              We Performed the Following     Follow-Up with Cardiologist        Primary Care Provider Office Phone # Fax #    Carline Palm [phone] [address] [address]        Equal Access to Services     BRIANNE NICHOLE : Hadii nas Guthrie, waaxantonio luqjuanito, qaybta kaalmaantonio block, flip guerrero . So [facility] [phone].    ATENCIÓN: Si habla español, tiene a lino disposición servicios gratuitos de asistencia lingüística. Danyelle al [phone].    We comply with applicable federal civil rights laws and [place] laws. We do not discriminate on the basis of race, color, national origin, age, disability sex, sexual orientation or gender identity.            Thank you!     Thank you for choosing [facility] [place]  for your care. Our goal is always to provide you with excellent care. Hearing back from our patients is one way we can continue to improve our services. Please take a few minutes to complete the written survey that you may receive in the mail after your visit with us. Thank you!             Your Updated Medication List - Protect others around you: Learn how to safely use, store and throw away your medicines at www.disposemymeds.org.          This list is accurate as of: 8/11/17 12:57 PM.  Always use your most recent med list.                   Brand Name Dispense Instructions for use Diagnosis    aspirin 81 MG tablet      Take 81 mg by mouth daily     "    atorvastatin 20 MG tablet    LIPITOR    90 tablet    Take 1 tablet (20 mg) by mouth daily    Hyperlipidemia LDL goal <130       Biotin 3 MG Tabs           CALCIUM + D PO      Take 1 chew tab by mouth 2 times daily        metoprolol 25 MG tablet    LOPRESSOR    180 tablet    Take 1 tablet (25 mg) by mouth 2 times daily    Heart palpitations       OMEGA-3 FISH OIL PO      Take 1 g by mouth 2 times daily (with meals)        PROBIOTIC DAILY PO      Take 1 tablet by mouth At Bedtime        VITAMIN D3 PO      Take 2,000 Units by mouth daily

## 2017-08-24 ENCOUNTER — TRANSFERRED RECORDS (OUTPATIENT)
Dept: HEALTH INFORMATION MANAGEMENT | Facility: CLINIC | Age: 67
End: 2017-08-24

## 2017-09-14 DIAGNOSIS — E78.5 HYPERLIPIDEMIA LDL GOAL <130: ICD-10-CM

## 2017-09-14 RX ORDER — ATORVASTATIN CALCIUM 20 MG/1
20 TABLET, FILM COATED ORAL DAILY
Qty: 90 TABLET | Refills: 3 | Status: SHIPPED | OUTPATIENT
Start: 2017-09-14 | End: 2018-09-10

## 2017-12-06 ENCOUNTER — TRANSFERRED RECORDS (OUTPATIENT)
Dept: HEALTH INFORMATION MANAGEMENT | Facility: CLINIC | Age: 67
End: 2017-12-06

## 2017-12-12 ENCOUNTER — TRANSFERRED RECORDS (OUTPATIENT)
Dept: HEALTH INFORMATION MANAGEMENT | Facility: CLINIC | Age: 67
End: 2017-12-12

## 2018-01-18 ENCOUNTER — OFFICE VISIT (OUTPATIENT)
Dept: FAMILY MEDICINE | Facility: CLINIC | Age: 68
End: 2018-01-18
Payer: COMMERCIAL

## 2018-01-18 ENCOUNTER — RADIANT APPOINTMENT (OUTPATIENT)
Dept: GENERAL RADIOLOGY | Facility: CLINIC | Age: 68
End: 2018-01-18
Attending: NURSE PRACTITIONER
Payer: COMMERCIAL

## 2018-01-18 VITALS
DIASTOLIC BLOOD PRESSURE: 81 MMHG | WEIGHT: 145 LBS | HEART RATE: 63 BPM | OXYGEN SATURATION: 100 % | BODY MASS INDEX: 23.3 KG/M2 | SYSTOLIC BLOOD PRESSURE: 154 MMHG | HEIGHT: 66 IN | TEMPERATURE: 97.3 F

## 2018-01-18 DIAGNOSIS — M79.644 PAIN OF FINGER OF RIGHT HAND: ICD-10-CM

## 2018-01-18 DIAGNOSIS — R21 RASH: Primary | ICD-10-CM

## 2018-01-18 PROCEDURE — 99213 OFFICE O/P EST LOW 20 MIN: CPT | Performed by: NURSE PRACTITIONER

## 2018-01-18 PROCEDURE — 73140 X-RAY EXAM OF FINGER(S): CPT | Mod: RT

## 2018-01-18 NOTE — NURSING NOTE
"Chief Complaint   Patient presents with     Derm Problem       Initial /81 (BP Location: Right arm, Cuff Size: Adult Regular)  Pulse 63  Temp 97.3  F (36.3  C) (Oral)  Ht 5' 6\" (1.676 m)  Wt 145 lb (65.8 kg)  SpO2 100%  BMI 23.4 kg/m2 Estimated body mass index is 23.4 kg/(m^2) as calculated from the following:    Height as of this encounter: 5' 6\" (1.676 m).    Weight as of this encounter: 145 lb (65.8 kg).  Medication Reconciliation: complete       Cassie Lucero CMA      "

## 2018-01-18 NOTE — MR AVS SNAPSHOT
After Visit Summary   1/18/2018    Amaya Huff    MRN: [id number]           Patient Information     Date Of Birth          1950        Visit Information        Provider Department      1/18/2018 11:30 AM Lian Cano APRN [facility] [place]        Today's Diagnoses     Pain of finger of right hand    -  1      Care Instructions    Take Tylenol, Ibuprofen or Aleve for pain.   Ibuprofen and Aleve are both antiinflammatories so you should never take these together during the same 24 hour period.  If you take a high dose of Ibuprofen, do not take it consistently for more than 5 days   Tylenol only helps with pain and does not help with inflammation. Tylenol is the safest option if you have kidney or heart history.  It is ok to take Tylenol with Ibuprofen or Aleve  Do not take more than:  Tylenol (acetaminophen)  1000 mg 3 times a day  Ibuprofen (Advil/Motrin) 600-800 mg 3-4 times a day WITH FOOD  Aleve 220mg 2 pills twice a day WITH FOOD            Follow-ups after your visit        Additional Services     ORTHOPEDICS ADULT REFERRAL       Your provider has referred you to: [facility]: [facility] - Tere Hendry -  [facility]  [phone]   [email or web address]    Please be aware that coverage of these services is subject to the terms and limitations of your health insurance plan.  Call member services at your health plan with any benefit or coverage questions.      Please bring the following to your appointment:    >>   Any x-rays, CTs or MRIs which have been performed.  Contact the facility where they were done to arrange for  prior to your scheduled appointment.    >>   List of current medications   >>   This referral request   >>   Any documents/labs given to you for this referral                  Your next 10 appointments already scheduled     Feb 12, 2018  1:00 PM CST  "  [facility]    [address] [facility] [address] [phone]              Who to contact     If you have questions or need follow up information about today's clinic visit or your schedule please contact [facility] directly at [phone].  Normal or non-critical lab and imaging results will be communicated to you by MyChart, letter or phone within 4 business days after the clinic has received the results. If you do not hear from us within 7 days, please contact the clinic through [employer]uModelinghart or phone. If you have a critical or abnormal lab result, we will notify you by phone as soon as possible.  Submit refill requests through [employer] or call your pharmacy and they will forward the refill request to us. Please allow 3 business days for your refill to be completed.          Additional Information About Your Visit        QuantuModelingharBandhappy Information     [employer] gives you secure access to your electronic health record. If you see a primary care provider, you can also send messages to your care team and make appointments. If you have questions, please call your primary care clinic.  If you do not have a primary care provider, please call [phone] and they will assist you.        Care EveryWhere ID     This is your Care EveryWhere ID. This could be used by other organizations to access your [place] medical records  [id number]        Your Vitals Were     Pulse Temperature Height Pulse Oximetry BMI (Body Mass Index)       63 97.3  F (36.3  C) (Oral) 5' 6\" (1.676 m) 100% 23.4 kg/m2        Blood Pressure from Last 3 Encounters:   01/18/18 154/81   08/11/17 128/76   06/30/17 126/65    Weight from Last 3 Encounters:   01/18/18 145 lb (65.8 kg)   08/11/17 139 lb (63 kg)   06/30/17 135 lb (61.2 kg)              We Performed the Following     ORTHOPEDICS ADULT REFERRAL        Primary Care Provider Office Phone # Fax #    Carline Palm MD " [phone]       [address] [facility] [address]  [facility] [address]        Equal Access to Services     SUZANNA NICHOLE : Hadii aad ku hadmariana Guthrie, waakhilda manjulaqjuanito, cierata katonyda mckayla, flip araizaliana jose de jesus. So [facility] [phone].    ATENCIÓN: Si habla español, tiene a lino disposición servicios gratuitos de asistencia lingüística. Llame al [phone].    We comply with applicable federal civil rights laws and [place] laws. We do not discriminate on the basis of race, color, national origin, age, disability, sex, sexual orientation, or gender identity.            Thank you!     Thank you for choosing [facility]  for your care. Our goal is always to provide you with excellent care. Hearing back from our patients is one way we can continue to improve our services. Please take a few minutes to complete the written survey that you may receive in the mail after your visit with us. Thank you!             Your Updated Medication List - Protect others around you: Learn how to safely use, store and throw away your medicines at www.disposemymeds.org.          This list is accurate as of: 1/18/18 12:10 PM.  Always use your most recent med list.                   Brand Name Dispense Instructions for use Diagnosis    aspirin 81 MG tablet      Take 81 mg by mouth daily        atorvastatin 20 MG tablet    LIPITOR    90 tablet    Take 1 tablet (20 mg) by mouth daily    Hyperlipidemia LDL goal <130       Biotin 3 MG Tabs           CALCIUM + D PO      Take 1 chew tab by mouth 2 times daily        metoprolol tartrate 25 MG tablet    LOPRESSOR    180 tablet    Take 1 tablet (25 mg) by mouth 2 times daily    Heart palpitations       OMEGA-3 FISH OIL PO      Take 1 g by mouth 2 times daily (with meals)        VITAMIN D3 PO      Take 2,000 Units by mouth daily

## 2018-01-18 NOTE — PROGRESS NOTES
HPI      SUBJECTIVE:   Amaya Huff is a 67 year old female who presents to clinic today for the following health issues:      Rash      Duration: 3 days    Description  Location: Abdominal area, both legs  Itching: no    Intensity:  moderate    Accompanying signs and symptoms: None    History (similar episodes/previous evaluation): None    Precipitating or alleviating factors:  New exposures:  None  Recent travel: YES      Therapies tried and outcome: none        Rash started Tues on abdomen only, has stayed the same since. Noticed a little on legs after shower today, then went away shortly after.  No where else on body.  No itching  No fevers  No new meds  No changes in lotions, soaps, or laundry detergents before rash, tried Lina for rash with no relief.  No allergies or food allergies. Has not had any new foods.  No SOB, CP  Wonders if it is stress related. Just got her daughter into [employer] and before she was living with her which was hard.   Wants to make sure rash isn't contagious so she can visit new grandson    Vivian right 4th PIP early January   Pain has not improved  Has swelling and bruising around joint, unable to get rings on  Hurts to bend it and to touch  Has tried using tape around finger      Past Medical History:   Diagnosis Date     Coronary artery disease     Mild to moderate CAD noted on 3/18/14 coronary angiogram      Heart palpitations      History of angina      Irregular heartbeat      Mitral valve disorders(424.0)      Other premature beats      Unspecified essential hypertension      Past Surgical History:   Procedure Laterality Date     C NONSPECIFIC PROCEDURE      S/P T&A     C NONSPECIFIC PROCEDURE      LASER FIBROIDS     C NONSPECIFIC PROCEDURE       x2     COLONOSCOPY  2012    Procedure:COLONOSCOPY; COLONOSCOPY; Surgeon:GINGER PARKS; Location: GI     COLONOSCOPY N/A 2017    Procedure: COLONOSCOPY;  colonoscopy;  Surgeon: Robbin  "Dez Sanches MD;  Location:  GI     CORONARY ANGIOGRAPHY ADULT ORDER  3/18/14    3/18/14- Mild to moderate CAD, no interventions, treat medically.     ENT SURGERY  tonsils     GYN SURGERY  c sections     Social History   Substance Use Topics     Smoking status: Never Smoker     Smokeless tobacco: Never Used     Alcohol use 0.0 oz/week     0 Standard drinks or equivalent per week      Comment: 1 glass wine/day     Current Outpatient Prescriptions   Medication Sig Dispense Refill     atorvastatin (LIPITOR) 20 MG tablet Take 1 tablet (20 mg) by mouth daily 90 tablet 3     Biotin 3 MG TABS        metoprolol (LOPRESSOR) 25 MG tablet Take 1 tablet (25 mg) by mouth 2 times daily 180 tablet 3     aspirin 81 MG tablet Take 81 mg by mouth daily       Cholecalciferol (VITAMIN D3 PO) Take 2,000 Units by mouth daily        Calcium Carbonate-Vitamin D (CALCIUM + D PO) Take 1 chew tab by mouth 2 times daily        Omega-3 Fatty Acids (OMEGA-3 FISH OIL PO) Take 1 g by mouth 2 times daily (with meals)        Allergies   Allergen Reactions     Penicillins      \"severe migraines\"       Reviewed and updated as needed this visit by clinical staff and provider      ROS  Detailed as above       /81 (BP Location: Right arm, Cuff Size: Adult Regular)  Pulse 63  Temp 97.3  F (36.3  C) (Oral)  Ht 5' 6\" (1.676 m)  Wt 145 lb (65.8 kg)  SpO2 100%  BMI 23.4 kg/m2      Physical Exam   Constitutional: She is well-developed, well-nourished, and in no distress.   HENT:   Head: Normocephalic.   Neck: Normal range of motion.   Cardiovascular: Normal rate.    Pulmonary/Chest: Effort normal.   Musculoskeletal:   Tender, slightly edematous and bruised Right 4th PIP. Full ROM but with reported pain    Neurological: She is alert. Gait normal.   Skin: Skin is warm and dry. Rash noted.   Tiny, diffuse, erythematous maculopapular rash on anterior lower abdomen   Psychiatric: Mood and affect normal.       Assessment and Plan:       ICD-10-CM    1. " Rash R21    2. Pain of finger of right hand M79.644 XR Finger Right G/E 2 Views     ORTHOPEDICS ADULT REFERRAL       Rash does not appear to be infectious or communicable. Suspect it is r/t significant stress. Plan is to increase use of lotion, will continue to watch.  If no improvement, will refer to derm.   Pain from jamming finger, xray showed no fracture.  Will refer to orthopedics for further eval and treatment.      Lian Cano, APRN, CNP  [facility]

## 2018-01-20 ENCOUNTER — HEALTH MAINTENANCE LETTER (OUTPATIENT)
Age: 68
End: 2018-01-20

## 2018-02-01 ENCOUNTER — OFFICE VISIT (OUTPATIENT)
Dept: ORTHOPEDICS | Facility: CLINIC | Age: 68
End: 2018-02-01
Payer: COMMERCIAL

## 2018-02-01 VITALS
DIASTOLIC BLOOD PRESSURE: 68 MMHG | BODY MASS INDEX: 23.3 KG/M2 | WEIGHT: 145 LBS | SYSTOLIC BLOOD PRESSURE: 125 MMHG | HEIGHT: 66 IN

## 2018-02-01 DIAGNOSIS — M19.041 PRIMARY OSTEOARTHRITIS OF RIGHT HAND: Primary | ICD-10-CM

## 2018-02-01 PROCEDURE — 99243 OFF/OP CNSLTJ NEW/EST LOW 30: CPT | Performed by: FAMILY MEDICINE

## 2018-02-01 NOTE — LETTER
2/1/2018         RE: Amaya Huff  [address] [address]        Dear Colleague,    Thank you for referring your patient, Amaya Huff, to the [facility]. Please see a copy of my visit note below.    ASSESSMENT & PLAN    1. Primary osteoarthritis of right hand      Reviewed xrays - arthritis  Ok for as needed, 1 -2 Ibuprofen would be reasonable  If pain limits your activity can consider cortisone injection  If limited relief with cortisone can consider PRP ($450)    Follow up as needed. Call direct clinic number [[phone]] at any time with questions or concerns.    -----    SUBJECTIVE  Amaya Huff is a/an 67 year old left hand dominant female who is seen in consultation at the request of  Lian Cano C.N.P. for evaluation of right ring finger pain. The patient is seen by themselves.    Onset: 1 years(s) ago and has been getting worse. Reports insidious onset without acute precipitating event.  Location of Pain: right hand, 3rd and 4th fingers- PIP joint  Rating of Pain at worst: 5/10  Rating of Pain Currently: 3/10  Worsened by: making a fist, cleaning  Better with: aspirin  Treatments tried: rest/activity avoidance, circumferential taping  Associated symptoms: swelling (unable to get her rings on)  Orthopedic history: YES - fractured 3rd and 4th fingers about 10 years ago- symptoms improved with buddy taping  Relevant surgical history: NO  Patient Social History: retired    Patient's past medical, surgical, social, and family histories were reviewed today and no changes are noted.    REVIEW OF SYSTEMS:  10 point ROS is negative other than symptoms noted above in HPI, Past Medical History or as stated below  Constitutional: NEGATIVE for fever, chills, change in weight  Skin: NEGATIVE for worrisome rashes, moles or lesions  GI/: NEGATIVE for bowel or bladder changes  Neuro: NEGATIVE for weakness, dizziness or paresthesias    OBJECTIVE:  BP  "125/68  Ht 5' 6\" (1.676 m)  Wt 145 lb (65.8 kg)  BMI 23.4 kg/m2   General: healthy, alert and in no distress  HEENT: no scleral icterus or conjunctival erythema  Skin: no suspicious lesions or rash. No jaundice.  CV: regular rhythm by palpation  Resp: normal respiratory effort without conversational dyspnea   Psych: normal mood and affect  Gait: normal steady gait with appropriate coordination and balance  Neuro: normal light touch sensory exam of the bilateral hands.    MSK:  RIGHT HAND  Inspection:    No swelling, bony hypertrophy at IP joints  Palpation:   Fingers: PIP joint, DIP joint  Range of Motion:    Full but painful/tight at end range of flexion at IP joints  Strength:     full  Special Tests:    Positive: grind at IP joints    Independent visualization of the below image:    Recent Results (from the past 744 hour(s))   XR Finger Right G/E 2 Views    Narrative    FINGER RIGHT TWO OR MORE VIEWS January 18, 2018 11:55 AM    HISTORY: Pain of finger of right hand.    COMPARISON: None.      Impression    IMPRESSION: Two views of the ring finger. No acute process. Joint  space narrowing, worse at the DIP joint. Findings are similar in the  visualized portions of the middle finger.     BLAS OBRIEN MD     Patient's conditions were thoroughly discussed during today's visit with greater than 50% of the visit spent counseling the patient with total time spent face-to-face with the patient being 20 minutes.    Niesha Robin DO [facility]        Again, thank you for allowing me to participate in the care of your patient.        Sincerely,        Niesha Robin,     "

## 2018-02-01 NOTE — PROGRESS NOTES
"ASSESSMENT & PLAN    1. Primary osteoarthritis of right hand      Reviewed xrays - arthritis  Ok for as needed, 1 -2 Ibuprofen would be reasonable  If pain limits your activity can consider cortisone injection  If limited relief with cortisone can consider PRP ($450)    Follow up as needed. Call direct clinic number [[phone]] at any time with questions or concerns.    -----    SUBJECTIVE  Amaya Huff is a/an 67 year old left hand dominant female who is seen in consultation at the request of  Lian SHARPENANDREAS for evaluation of right ring finger pain. The patient is seen by themselves.    Onset: 1 years(s) ago and has been getting worse. Reports insidious onset without acute precipitating event.  Location of Pain: right hand, 3rd and 4th fingers- PIP joint  Rating of Pain at worst: 5/10  Rating of Pain Currently: 3/10  Worsened by: making a fist, cleaning  Better with: aspirin  Treatments tried: rest/activity avoidance, circumferential taping  Associated symptoms: swelling (unable to get her rings on)  Orthopedic history: YES - fractured 3rd and 4th fingers about 10 years ago- symptoms improved with buddy taping  Relevant surgical history: NO  Patient Social History: retired    Patient's past medical, surgical, social, and family histories were reviewed today and no changes are noted.    REVIEW OF SYSTEMS:  10 point ROS is negative other than symptoms noted above in HPI, Past Medical History or as stated below  Constitutional: NEGATIVE for fever, chills, change in weight  Skin: NEGATIVE for worrisome rashes, moles or lesions  GI/: NEGATIVE for bowel or bladder changes  Neuro: NEGATIVE for weakness, dizziness or paresthesias    OBJECTIVE:  /68  Ht 5' 6\" (1.676 m)  Wt 145 lb (65.8 kg)  BMI 23.4 kg/m2   General: healthy, alert and in no distress  HEENT: no scleral icterus or conjunctival erythema  Skin: no suspicious lesions or rash. No jaundice.  CV: regular rhythm by palpation  Resp: " normal respiratory effort without conversational dyspnea   Psych: normal mood and affect  Gait: normal steady gait with appropriate coordination and balance  Neuro: normal light touch sensory exam of the bilateral hands.    MSK:  RIGHT HAND  Inspection:    No swelling, bony hypertrophy at IP joints  Palpation:   Fingers: PIP joint, DIP joint  Range of Motion:    Full but painful/tight at end range of flexion at IP joints  Strength:     full  Special Tests:    Positive: grind at IP joints    Independent visualization of the below image:    Recent Results (from the past 744 hour(s))   XR Finger Right G/E 2 Views    Narrative    FINGER RIGHT TWO OR MORE VIEWS January 18, 2018 11:55 AM    HISTORY: Pain of finger of right hand.    COMPARISON: None.      Impression    IMPRESSION: Two views of the ring finger. No acute process. Joint  space narrowing, worse at the DIP joint. Findings are similar in the  visualized portions of the middle finger.     BLAS OBRIEN MD     Patient's conditions were thoroughly discussed during today's visit with greater than 50% of the visit spent counseling the patient with total time spent face-to-face with the patient being 20 minutes.    Niesha Robin DO [facility]

## 2018-02-01 NOTE — PATIENT INSTRUCTIONS
1. Primary osteoarthritis of right hand      Reviewed xrays - arthritis  Ok for as needed, 1 -2 Ibuprofen would be reasonable  If pain limits your activity can consider cortisone injection  If limited relief with cortisone can consider PRP ($450)    Follow up as needed. Call direct clinic number [[phone]] at any time with questions or concerns.      NEW WEBSITE HAS LAUNCHED  [email or web address]    We care about your feedback and use it to improve our services. Please leave feedback on the Testamonials & Reviews page.

## 2018-02-01 NOTE — MR AVS SNAPSHOT
After Visit Summary   2/1/2018    Amaya Huff    MRN: [id number]           Patient Information     Date Of Birth          1950        Visit Information        Provider Department      2/1/2018 8:20 AM Niesha Robin DO [facility]        Today's Diagnoses     Primary osteoarthritis of right hand    -  1      Care Instructions    1. Primary osteoarthritis of right hand      Reviewed xrays - arthritis  Ok for as needed, 1 -2 Ibuprofen would be reasonable  If pain limits your activity can consider cortisone injection  If limited relief with cortisone can consider PRP ($450)    Follow up as needed. Call direct clinic number [[phone]] at any time with questions or concerns.      NEW WEBSITE HAS LAUNCHED  [email or web address]    We care about your feedback and use it to improve our services. Please leave feedback on the Testamonials & Reviews page.              Follow-ups after your visit        Your next 10 appointments already scheduled     Feb 12, 2018  1:00 PM CST   PHYSICAL with Carline Palm MD   [facility]    [address] [address] [phone]              Who to contact     If you have questions or need follow up information about today's clinic visit or your schedule please contact [facility] directly at [phone].  Normal or non-critical lab and imaging results will be communicated to you by MyChart, letter or phone within 4 business days after the clinic has received the results. If you do not hear from us within 7 days, please contact the clinic through MyChart or phone. If you have a critical or abnormal lab result, we will notify you by phone as soon as possible.  Submit refill requests through [employer] or call your pharmacy and they will forward the refill request to us. Please allow 3 business days for your refill to be completed.          Additional  "Information About Your Visit        MyChart Information     [employer] gives you secure access to your electronic health record. If you see a primary care provider, you can also send messages to your care team and make appointments. If you have questions, please call your primary care clinic.  If you do not have a primary care provider, please call [phone] and they will assist you.        Care EveryWhere ID     This is your Care EveryWhere ID. This could be used by other organizations to access your [facility] medical records  [id number]        Your Vitals Were     Height BMI (Body Mass Index)                5' 6\" (1.676 m) 23.4 kg/m2           Blood Pressure from Last 3 Encounters:   02/01/18 125/68   01/18/18 154/81   08/11/17 128/76    Weight from Last 3 Encounters:   02/01/18 145 lb (65.8 kg)   01/18/18 145 lb (65.8 kg)   08/11/17 139 lb (63 kg)              Today, you had the following     No orders found for display       Primary Care Provider Office Phone # Fax #    Bhavale Palm [phone] [address]  [place] [address]        Equal Access to Services     [facility]NE [facility] AH: Hadii nas palomo Soalessandra, waaxda lutony, qaybta kaalmada adesandrada, flip dela cruz. So [facility] [place] [phone].    ATENCIÓN: Si habla español, tiene a lino disposición servicios gratuitos de asistencia lingüística. Danyelle al [phone].    We comply with applicable federal civil rights laws and [place] laws. We do not discriminate on the basis of race, color, national origin, age, disability, sex, sexual orientation, or gender identity.            Thank you!     Thank you for choosing [facility]  for your care. Our goal is always to provide you with excellent care. Hearing back from our patients is one way we can continue to improve our services. Please take a few minutes to complete the written survey that you may receive in the mail after your visit with " us. Thank you!             Your Updated Medication List - Protect others around you: Learn how to safely use, store and throw away your medicines at www.disposemymeds.org.          This list is accurate as of 2/1/18  8:47 AM.  Always use your most recent med list.                   Brand Name Dispense Instructions for use Diagnosis    aspirin 81 MG tablet      Take 81 mg by mouth daily        atorvastatin 20 MG tablet    LIPITOR    90 tablet    Take 1 tablet (20 mg) by mouth daily    Hyperlipidemia LDL goal <130       Biotin 3 MG Tabs           CALCIUM + D PO      Take 1 chew tab by mouth 2 times daily        metoprolol tartrate 25 MG tablet    LOPRESSOR    180 tablet    Take 1 tablet (25 mg) by mouth 2 times daily    Heart palpitations       OMEGA-3 FISH OIL PO      Take 1 g by mouth 2 times daily (with meals)        VITAMIN D3 PO      Take 2,000 Units by mouth daily

## 2018-02-12 ENCOUNTER — OFFICE VISIT (OUTPATIENT)
Dept: FAMILY MEDICINE | Facility: CLINIC | Age: 68
End: 2018-02-12
Payer: COMMERCIAL

## 2018-02-12 VITALS
SYSTOLIC BLOOD PRESSURE: 140 MMHG | BODY MASS INDEX: 22.5 KG/M2 | WEIGHT: 140 LBS | TEMPERATURE: 97.4 F | OXYGEN SATURATION: 100 % | DIASTOLIC BLOOD PRESSURE: 72 MMHG | HEIGHT: 66 IN | HEART RATE: 64 BPM

## 2018-02-12 DIAGNOSIS — E78.5 HYPERLIPIDEMIA WITH TARGET LDL LESS THAN 130: ICD-10-CM

## 2018-02-12 DIAGNOSIS — Z00.00 ROUTINE GENERAL MEDICAL EXAMINATION AT A HEALTH CARE FACILITY: Primary | ICD-10-CM

## 2018-02-12 DIAGNOSIS — L65.9 ALOPECIA: ICD-10-CM

## 2018-02-12 DIAGNOSIS — M79.644 PAIN OF FINGER OF RIGHT HAND: ICD-10-CM

## 2018-02-12 DIAGNOSIS — I25.10 MILD CAD: ICD-10-CM

## 2018-02-12 DIAGNOSIS — K56.609 SBO (SMALL BOWEL OBSTRUCTION) (H): ICD-10-CM

## 2018-02-12 LAB — VIT B12 SERPL-MCNC: 574 PG/ML (ref 193–986)

## 2018-02-12 PROCEDURE — 99000 SPECIMEN HANDLING OFFICE-LAB: CPT | Performed by: INTERNAL MEDICINE

## 2018-02-12 PROCEDURE — 36415 COLL VENOUS BLD VENIPUNCTURE: CPT | Performed by: INTERNAL MEDICINE

## 2018-02-12 PROCEDURE — 80053 COMPREHEN METABOLIC PANEL: CPT | Performed by: INTERNAL MEDICINE

## 2018-02-12 PROCEDURE — 99397 PER PM REEVAL EST PAT 65+ YR: CPT | Performed by: INTERNAL MEDICINE

## 2018-02-12 PROCEDURE — 80061 LIPID PANEL: CPT | Performed by: INTERNAL MEDICINE

## 2018-02-12 PROCEDURE — 82728 ASSAY OF FERRITIN: CPT | Performed by: INTERNAL MEDICINE

## 2018-02-12 PROCEDURE — 82607 VITAMIN B-12: CPT | Performed by: INTERNAL MEDICINE

## 2018-02-12 PROCEDURE — 84630 ASSAY OF ZINC: CPT | Mod: 90 | Performed by: INTERNAL MEDICINE

## 2018-02-12 NOTE — PROGRESS NOTES
SUBJECTIVE:   Amaya Huff is a 67 year old female who presents for Preventive Visit.    Rt 4th finger bruises easily  She cannot shovel snow  She has no chest pain or shortness of breath  No joint pains other than this finger  She broke this finger 12 years ago while working in the yard    Healthy Habits:    Do you get at least three servings of calcium containing foods daily (dairy, green leafy vegetables, etc.)? yes    Amount of exercise or daily activities, outside of work: 2-4 day(s) per week    Problems taking medications regularly No    Medication side effects: No    Have you had an eye exam in the past two years? yes    Do you see a dentist twice per year? yes    Do you have sleep apnea, excessive snoring or daytime drowsiness?no      Ability to successfully perform activities of daily living: Yes, no assistance needed    Home safety:  lack of grab bars in the bathroom     Hearing impairment: No    Fall risk:  Fallen 2 or more times in the past year?: No  Any fall with injury in the past year?: No        COGNITIVE SCREEN  1) Repeat 3 items (Banana, Sunrise, Chair)    2) Clock draw: NORMAL  3) 3 item recall: Recalls 3 objects  Results: 3 items recalled: COGNITIVE IMPAIRMENT LESS LIKELY    Mini-CogTM Copyright S Debi. Licensed by the author for use in [facility]; reprinted with permission (soflor@.[email or web address]). All rights reserved.        Wt Readings from Last 2 Encounters:   02/12/18 140 lb (63.5 kg)   02/01/18 145 lb (65.8 kg)               Reviewed and updated as needed this visit by clinical staff  Tobacco  Allergies  Meds  Problems  Fam Hx         Reviewed and updated as needed this visit by Provider  Allergies  Meds  Problems  Fam Hx        Social History   Substance Use Topics     Smoking status: Never Smoker     Smokeless tobacco: Never Used     Alcohol use 0.0 oz/week     0 Standard drinks or equivalent per week      Comment: 1 glass wine/day       If you drink alcohol do you  typically have >3 drinks per day or >7 drinks per week? No                        Today's PHQ-2 Score:   PHQ-2 (  [employer]) 2018   Q1: Little interest or pleasure in doing things 0 0   Q2: Feeling down, depressed or hopeless 0 0   PHQ-2 Score 0 0   Q1: Little interest or pleasure in doing things - -   Q2: Feeling down, depressed or hopeless - -   PHQ-2 Score - -       Do you feel safe in your environment - Yes    Do you have a Health Care Directive?: Yes: Patient states has Advance Directive and will bring in a copy to clinic.    Current providers sharing in care for this patient include:   Patient Care Team:  Carline Palm MD as PCP - General (Internal Medicine)    The following health maintenance items are reviewed in Epic and correct as of today:  Health Maintenance   Topic Date Due     HPV Q3 Years  1963     PAP Q3 YR  2017     LIPID MONITORING Q1 YEAR  2017     MAMMO Q1 YR  2018     FALL RISK ASSESSMENT  2019     PNEUMO 5YR BOOST DUE AFTER AGE 65 (NO IB MSG) (2) 2019     PNEUMOCOCCAL (2 of 2 - PPSV23) 2019     ADVANCE DIRECTIVE PLANNING Q5 YRS  2020     TETANUS IMMUNIZATION (SYSTEM ASSIGNED)  2024     COLON CANCER SCREEN (SYSTEM ASSIGNED)  2027     INFLUENZA VACCINE (SYSTEM ASSIGNED)  Completed     DEXA SCAN SCREENING (SYSTEM ASSIGNED)  Completed     HEPATITIS C SCREENING  Completed     Patient Active Problem List   Diagnosis     Abdominal pain     Premature beats     Mitral valve disorder     Advanced directives, counseling/discussion     Hyperlipidemia with target LDL less than 130     Mild CAD     SBO (small bowel obstruction)     Past Surgical History:   Procedure Laterality Date     C NONSPECIFIC PROCEDURE      S/P T&A     C NONSPECIFIC PROCEDURE      LASER FIBROIDS     C NONSPECIFIC PROCEDURE       x2     COLONOSCOPY  2012    Procedure:COLONOSCOPY; COLONOSCOPY; Surgeon:GINGER PARKS; Location: GI      "COLONOSCOPY N/A 6/30/2017    Procedure: COLONOSCOPY;  colonoscopy;  Surgeon: Dez Siegel MD;  Location:  GI     CORONARY ANGIOGRAPHY ADULT ORDER  3/18/14    3/18/14- Mild to moderate CAD, no interventions, treat medically.     ENT SURGERY  tonsils     GYN SURGERY  c sections       Social History   Substance Use Topics     Smoking status: Never Smoker     Smokeless tobacco: Never Used     Alcohol use 0.0 oz/week     0 Standard drinks or equivalent per week      Comment: 1 glass wine/day     Family History   Problem Relation Age of Onset     C.A.D. Mother      Parkinsonism Mother 80     Alcohol/Drug Daughter 30     Circulatory Maternal Grandmother      MVP     Circulatory Maternal Aunt      MVP         Current Outpatient Prescriptions   Medication Sig Dispense Refill     atorvastatin (LIPITOR) 20 MG tablet Take 1 tablet (20 mg) by mouth daily 90 tablet 3     Biotin 3 MG TABS        metoprolol (LOPRESSOR) 25 MG tablet Take 1 tablet (25 mg) by mouth 2 times daily 180 tablet 3     aspirin 81 MG tablet Take 81 mg by mouth daily       Cholecalciferol (VITAMIN D3 PO) Take 2,000 Units by mouth daily        Calcium Carbonate-Vitamin D (CALCIUM + D PO) Take 1 chew tab by mouth 2 times daily        Omega-3 Fatty Acids (OMEGA-3 FISH OIL PO) Take 1 g by mouth 2 times daily (with meals)              ROS:  Constitutional, HEENT, cardiovascular, pulmonary, GI, , musculoskeletal, neuro, skin, endocrine and psych systems are negative, except as otherwise noted.    OBJECTIVE:   /72 (BP Location: Left arm, Cuff Size: Adult Regular)  Pulse 64  Temp 97.4  F (36.3  C) (Oral)  Ht 5' 6\" (1.676 m)  Wt 140 lb (63.5 kg)  SpO2 100%  BMI 22.6 kg/m2 Estimated body mass index is 22.6 kg/(m^2) as calculated from the following:    Height as of this encounter: 5' 6\" (1.676 m).    Weight as of this encounter: 140 lb (63.5 kg).  EXAM:   GENERAL APPEARANCE: healthy, alert and no distress  EYES: Eyes grossly normal to inspection, " PERRL and conjunctivae and sclerae normal  HENT: ear canals and TM's normal, nose and mouth without ulcers or lesions, oropharynx clear and oral mucous membranes moist  NECK: no adenopathy, no asymmetry, masses, or scars and thyroid normal to palpation  RESP: lungs clear to auscultation - no rales, rhonchi or wheezes  BREAST: normal without masses, tenderness or nipple discharge and no palpable axillary masses or adenopathy  CV: regular rate and rhythm, normal S1 S2, no S3 or S4, no murmur, click or rub, no peripheral edema and peripheral pulses strong  ABDOMEN: soft, nontender, no hepatosplenomegaly, no masses and bowel sounds normal  MS: rt 3rd finger swelling   no musculoskeletal defects are noted and gait is age appropriate without ataxia  SKIN: no suspicious lesions or rashes  NEURO: Normal strength and tone, sensory exam grossly normal, mentation intact and speech normal  PSYCH: mentation appears normal and affect normal/bright    ASSESSMENT / PLAN:   Amaya was seen today for wellness visit.    Diagnoses and all orders for this visit:    Routine general medical examination at a health care facility  Skin cream started by derm for cancer prevention  Mammogram normal  uptodate on colon exam and immunization   Mild CAD    Angio in 2014 showed this and she is on bb, statins, asa    Hyperlipidemia with target LDL less than 130  -     Lipid panel reflex to direct LDL Non-fasting  -     Comprehensive metabolic panel  Risk of DM discussed    SBO (small bowel obstruction)  No recurrence  Pain of finger of right hand  -     [facility] PT, HAND, AND CHIROPRACTIC REFERRAL        End of Life Planning:  Patient currently has an advanced directive: No.  I have verified the patient's ablity to prepare an advanced directive/make health care decisions.  Literature was provided to assist patient in preparing an advanced directive.    COUNSELING:  Reviewed preventive health counseling, as reflected in patient instructions        "Regular exercise       Healthy diet/nutrition        Estimated body mass index is 22.6 kg/(m^2) as calculated from the following:    Height as of this encounter: 5' 6\" (1.676 m).    Weight as of this encounter: 140 lb (63.5 kg).       reports that she has never smoked. She has never used smokeless tobacco.      Appropriate preventive services were discussed with this patient, including applicable screening as appropriate for cardiovascular disease, diabetes, osteopenia/osteoporosis, and glaucoma.  As appropriate for age/gender, discussed screening for colorectal cancer,  breast cancer, and cervical cancer. Checklist reviewing preventive services available has been given to the patient.    Reviewed patients plan of care and provided an AVS. The Basic Care Plan (routine screening as documented in Health Maintenance) for Amaya meets the Care Plan requirement. This Care Plan has been established and reviewed with the Patient.    Counseling Resources:  ATP IV Guidelines  Pooled Cohorts Equation Calculator  Breast Cancer Risk Calculator  FRAX Risk Assessment  ICSI Preventive Guidelines  Dietary Guidelines for Americans, 2010  USDA's MyPlate  ASA Prophylaxis  Lung CA Screening    Carline Palm MD  [facility]  "

## 2018-02-12 NOTE — NURSING NOTE
"Chief Complaint   Patient presents with     Wellness Visit     Not fasting       Initial /72 (BP Location: Left arm, Cuff Size: Adult Regular)  Pulse 64  Temp 97.4  F (36.3  C) (Oral)  Ht 5' 6\" (1.676 m)  Wt 140 lb (63.5 kg)  SpO2 100%  BMI 22.6 kg/m2 Estimated body mass index is 22.6 kg/(m^2) as calculated from the following:    Height as of this encounter: 5' 6\" (1.676 m).    Weight as of this encounter: 140 lb (63.5 kg).  Medication Reconciliation: complete     Cassie Lucero CMA      "

## 2018-02-12 NOTE — MR AVS SNAPSHOT
After Visit Summary   2/12/2018    Amaya Huff    MRN: [id number]           Patient Information     Date Of Birth          1950        Visit Information        Provider Department      2/12/2018 1:00 PM Carline Palm MD [facility]        Today's Diagnoses     Routine general medical examination at a health care facility    -  1    Mild CAD        Hyperlipidemia with target LDL less than 130        SBO (small bowel obstruction)        Pain of finger of right hand          Care Instructions      Preventive Health Recommendations    Female Ages 65 +    Yearly exam:     See your health care provider every year in order to  o Review health changes.   o Discuss preventive care.    o Review your medicines if your doctor has prescribed any.      You no longer need a yearly Pap test unless you've had an abnormal Pap test in the past 10 years. If you have vaginal symptoms, such as bleeding or discharge, be sure to talk with your provider about a Pap test.      Every 1 to 2 years, have a mammogram.  If you are over 69, talk with your health care provider about whether or not you want to continue having screening mammograms.      Every 10 years, have a colonoscopy. Or, have a yearly FIT test (stool test). These exams will check for colon cancer.       Have a cholesterol test every 5 years, or more often if your doctor advises it.       Have a diabetes test (fasting glucose) every three years. If you are at risk for diabetes, you should have this test more often.       At age 65, have a bone density scan (DEXA) to check for osteoporosis (brittle bone disease).    Shots:    Get a flu shot each year.    Get a tetanus shot every 10 years.    Talk to your doctor about your pneumonia vaccines. There are now two you should receive - Pneumovax (PPSV 23) and Prevnar (PCV 13).    Talk to your doctor about the shingles vaccine.    Talk to your doctor about the hepatitis B vaccine.    Nutrition:      Eat at least 5 servings of fruits and vegetables each day.      Eat whole-grain bread, whole-wheat pasta and brown rice instead of white grains and rice.      Talk to your provider about Calcium and Vitamin D.     Lifestyle    Exercise at least 150 minutes a week (30 minutes a day, 5 days a week). This will help you control your weight and prevent disease.      Limit alcohol to one drink per day.      No smoking.       Wear sunscreen to prevent skin cancer.       See your dentist twice a year for an exam and cleaning.      See your eye doctor every 1 to 2 years to screen for conditions such as glaucoma, macular degeneration and cataracts.          Follow-ups after your visit        Additional Services     [facility] PT, HAND, AND CHIROPRACTIC REFERRAL       **This order will print in the [facility] Scheduling Office**    Physical Therapy, Hand Therapy and Chiropractic Care are available through:    *[facility]  *[facility]  *[facility]    Call one number to schedule at any of the above locations: [phone].    Your provider has referred you to: Physical Therapy at [facility] or [facility]    Indication/Reason for Referral: Hand Pain  Onset of Illness: weeks  Therapy Orders: Evaluate and Treat  Special Programs: None  Special Request: None    Meera Estes      Additional Comments for the Therapist or Chiropractor:     Please be aware that coverage of these services is subject to the terms and limitations of your health insurance plan.  Call member services at your health plan with any benefit or coverage questions.      Please bring the following to your appointment:    *Your personal calendar for scheduling future appointments  *Comfortable clothing                  Who to contact     If you have questions or need follow up information about today's clinic visit or your schedule please [facility] directly at [phone].  Normal or non-critical lab and  "imaging results will be communicated to you by MyChart, letter or phone within 4 business days after the clinic has received the results. If you do not hear from us within 7 days, please contact the clinic through [employer] or phone. If you have a critical or abnormal lab result, we will notify you by phone as soon as possible.  Submit refill requests through [employer] or call your pharmacy and they will forward the refill request to us. Please allow 3 business days for your refill to be completed.          Additional Information About Your Visit        [employer] Information     [employer] gives you secure access to your electronic health record. If you see a primary care provider, you can also send messages to your care team and make appointments. If you have questions, please call your primary care clinic.  If you do not have a primary care provider, please call [phone] and they will assist you.        Care EveryWhere ID     This is your Care EveryWhere ID. This could be used by other organizations to access your [facility] medical records  [id number]        Your Vitals Were     Pulse Temperature Height Pulse Oximetry BMI (Body Mass Index)       64 97.4  F (36.3  C) (Oral) 5' 6\" (1.676 m) 100% 22.6 kg/m2        Blood Pressure from Last 3 Encounters:   02/12/18 140/72   02/01/18 125/68   01/18/18 154/81    Weight from Last 3 Encounters:   02/12/18 140 lb (63.5 kg)   02/01/18 145 lb (65.8 kg)   01/18/18 145 lb (65.8 kg)              We Performed the Following     Comprehensive metabolic panel     [facility] PT, HAND, AND CHIROPRACTIC REFERRAL     Lipid panel reflex to direct LDL Non-fasting        Primary Care Provider Office Phone # Fax #    Carline Palm [phone] [address]  [place] [address]        Equal Access to Services     SUZANNA NICHOLE : Hadii nas statono Soalessandra, waaxda luqadaha, qaybta kaalmada artisyaantonio, flip dela cruz. So wac " [phone].    ATENCIÓN: Si debra campos, tiene a lino disposición servicios gratuitos de asistencia lingüística. Danyelle ziegler [phone].    We comply with applicable federal civil rights laws and [place] laws. We do not discriminate on the basis of race, color, national origin, age, disability, sex, sexual orientation, or gender identity.            Thank you!     Thank you for choosing [facility]  for your care. Our goal is always to provide you with excellent care. Hearing back from our patients is one way we can continue to improve our services. Please take a few minutes to complete the written survey that you may receive in the mail after your visit with us. Thank you!             Your Updated Medication List - Protect others around you: Learn how to safely use, store and throw away your medicines at www.disposemymeds.org.          This list is accurate as of 2/12/18  1:29 PM.  Always use your most recent med list.                   Brand Name Dispense Instructions for use Diagnosis    aspirin 81 MG tablet      Take 81 mg by mouth daily        atorvastatin 20 MG tablet    LIPITOR    90 tablet    Take 1 tablet (20 mg) by mouth daily    Hyperlipidemia LDL goal <130       Biotin 3 MG Tabs           CALCIUM + D PO      Take 1 chew tab by mouth 2 times daily        metoprolol tartrate 25 MG tablet    LOPRESSOR    180 tablet    Take 1 tablet (25 mg) by mouth 2 times daily    Heart palpitations       OMEGA-3 FISH OIL PO      Take 1 g by mouth 2 times daily (with meals)        VITAMIN D3 PO      Take 2,000 Units by mouth daily

## 2018-02-13 LAB
ALBUMIN SERPL-MCNC: 4.4 G/DL (ref 3.4–5)
ALP SERPL-CCNC: 76 U/L (ref 40–150)
ALT SERPL W P-5'-P-CCNC: 29 U/L (ref 0–50)
ANION GAP SERPL CALCULATED.3IONS-SCNC: 8 MMOL/L (ref 3–14)
AST SERPL W P-5'-P-CCNC: 25 U/L (ref 0–45)
BILIRUB SERPL-MCNC: 0.6 MG/DL (ref 0.2–1.3)
BUN SERPL-MCNC: 13 MG/DL (ref 7–30)
CALCIUM SERPL-MCNC: 9.7 MG/DL (ref 8.5–10.1)
CHLORIDE SERPL-SCNC: 106 MMOL/L (ref 94–109)
CHOLEST SERPL-MCNC: 165 MG/DL
CO2 SERPL-SCNC: 26 MMOL/L (ref 20–32)
CREAT SERPL-MCNC: 0.71 MG/DL (ref 0.52–1.04)
FERRITIN SERPL-MCNC: 40 NG/ML (ref 8–252)
GFR SERPL CREATININE-BSD FRML MDRD: 82 ML/MIN/1.7M2
GLUCOSE SERPL-MCNC: 84 MG/DL (ref 70–99)
HDLC SERPL-MCNC: 81 MG/DL
LDLC SERPL CALC-MCNC: 65 MG/DL
NONHDLC SERPL-MCNC: 84 MG/DL
POTASSIUM SERPL-SCNC: 3.7 MMOL/L (ref 3.4–5.3)
PROT SERPL-MCNC: 7.4 G/DL (ref 6.8–8.8)
SODIUM SERPL-SCNC: 140 MMOL/L (ref 133–144)
TRIGL SERPL-MCNC: 97 MG/DL

## 2018-02-14 LAB — ZINC SERPL-MCNC: 70 UG/DL (ref 60–120)

## 2018-06-08 DIAGNOSIS — I25.10 MILD CAD: Primary | ICD-10-CM

## 2018-06-09 NOTE — TELEPHONE ENCOUNTER
"Last Written Prescription Date:  6/05/17  Last Fill Quantity: 180 tablet,  # refills: 3   Last office visit: 2/12/2018 with prescribing provider:  Arpita   Future Office Visit:      Requested Prescriptions   Pending Prescriptions Disp Refills     metoprolol tartrate (LOPRESSOR) 25 MG tablet [Pharmacy Med Name: METOPROLOL TARTRATE 25 MG TAB] 180 tablet 0     Sig: TAKE 1 TABLET (25 MG) BY MOUTH 2 TIMES DAILY    Beta-Blockers Protocol Failed    6/8/2018 11:18 PM       Failed - Blood pressure under 140/90 in past 12 months    BP Readings from Last 3 Encounters:   02/12/18 140/72   02/01/18 125/68   01/18/18 154/81                Passed - Patient is age 6 or older       Passed - Recent (12 mo) or future (30 days) visit within the authorizing provider's specialty    Patient had office visit in the last 12 months or has a visit in the next 30 days with authorizing provider or within the authorizing provider's specialty.  See \"Patient Info\" tab in inbasket, or \"Choose Columns\" in Meds & Orders section of the refill encounter.              "

## 2018-06-10 DIAGNOSIS — R00.2 HEART PALPITATIONS: ICD-10-CM

## 2018-06-11 RX ORDER — METOPROLOL TARTRATE 25 MG/1
TABLET, FILM COATED ORAL
Qty: 180 TABLET | Refills: 2 | Status: SHIPPED | OUTPATIENT
Start: 2018-06-11 | End: 2019-02-20

## 2018-06-11 NOTE — TELEPHONE ENCOUNTER
Prescription approved per [facility] Refill Protocol.  Dona BRIDGES RN

## 2018-06-12 RX ORDER — METOPROLOL TARTRATE 25 MG/1
TABLET, FILM COATED ORAL
Start: 2018-06-12

## 2018-06-12 NOTE — TELEPHONE ENCOUNTER
Message sent to [facility] in [place]-    Duplicate was ordered at [facility] in [place] 6/11/18 # 180 R 2 Please contact them for a transfer [phone]    Aixa Stone RN

## 2018-07-24 ENCOUNTER — TELEPHONE (OUTPATIENT)
Dept: CARDIOLOGY | Facility: CLINIC | Age: 68
End: 2018-07-24

## 2018-07-24 NOTE — TELEPHONE ENCOUNTER
Pt called stating her mother has prolapsed valve and parkinson's disease. The patients mother's prolapse mitral valve is getting worse. Patients mother lives in small town of [place]. The doctor in [place] want to send her to [place] and get a mitralclip. Pts daughter wondering more about this procedure and what kind of sedation it entails. Writer informed patient that this procedure does require full sedation and it is hard to tell how the patients mother will do during the procedure without knowing the patient. Informed patient that Dr. Simental would be happy to see patients mother to get evaluated if they would like. Pt stated that she will discuss with her family and call if they would like patients mother to be seen. No further questions or concerns.

## 2018-09-10 DIAGNOSIS — E78.5 HYPERLIPIDEMIA LDL GOAL <130: ICD-10-CM

## 2018-09-10 RX ORDER — ATORVASTATIN CALCIUM 20 MG/1
20 TABLET, FILM COATED ORAL DAILY
Qty: 90 TABLET | Refills: 3 | Status: SHIPPED | OUTPATIENT
Start: 2018-09-10 | End: 2019-03-21

## 2019-01-15 ENCOUNTER — TRANSFERRED RECORDS (OUTPATIENT)
Dept: HEALTH INFORMATION MANAGEMENT | Facility: CLINIC | Age: 69
End: 2019-01-15

## 2019-02-06 ENCOUNTER — ANCILLARY PROCEDURE (OUTPATIENT)
Dept: GENERAL RADIOLOGY | Facility: CLINIC | Age: 69
End: 2019-02-06
Attending: INTERNAL MEDICINE
Payer: MEDICARE

## 2019-02-06 ENCOUNTER — OFFICE VISIT (OUTPATIENT)
Dept: FAMILY MEDICINE | Facility: CLINIC | Age: 69
End: 2019-02-06
Payer: MEDICARE

## 2019-02-06 VITALS
DIASTOLIC BLOOD PRESSURE: 74 MMHG | SYSTOLIC BLOOD PRESSURE: 148 MMHG | OXYGEN SATURATION: 100 % | HEART RATE: 63 BPM | TEMPERATURE: 96.8 F | HEIGHT: 66 IN | BODY MASS INDEX: 22.66 KG/M2 | WEIGHT: 141 LBS

## 2019-02-06 DIAGNOSIS — M79.671 RIGHT FOOT PAIN: ICD-10-CM

## 2019-02-06 DIAGNOSIS — M79.671 RIGHT FOOT PAIN: Primary | ICD-10-CM

## 2019-02-06 PROCEDURE — 99214 OFFICE O/P EST MOD 30 MIN: CPT | Performed by: INTERNAL MEDICINE

## 2019-02-06 PROCEDURE — 73630 X-RAY EXAM OF FOOT: CPT | Mod: RT

## 2019-02-06 ASSESSMENT — MIFFLIN-ST. JEOR: SCORE: 1191.32

## 2019-02-06 NOTE — PROGRESS NOTES
"  SUBJECTIVE:   Amaya Huff is a 68 year old female who presents to clinic today for the following health issues:      Musculoskeletal problem/pain      Duration: 3 months    Description  Location: right foot, right second finger    Intensity:  moderate    Accompanying signs and symptoms: none    History  Previous similar problem: no   Previous evaluation:  none    Precipitating or alleviating factors:  Trauma or overuse: no   Aggravating factors include: walking    Therapies tried and outcome: nothing      Current Medications:     Current Outpatient Medications   Medication Sig Dispense Refill     aspirin 81 MG tablet Take 81 mg by mouth daily       atorvastatin (LIPITOR) 20 MG tablet Take 1 tablet (20 mg) by mouth daily 90 tablet 3     Biotin 3 MG TABS        Calcium Carbonate-Vitamin D (CALCIUM + D PO) Take 1 chew tab by mouth 2 times daily        Cholecalciferol (VITAMIN D3 PO) Take 2,000 Units by mouth daily        metoprolol tartrate (LOPRESSOR) 25 MG tablet TAKE 1 TABLET (25 MG) BY MOUTH 2 TIMES DAILY 180 tablet 2     Omega-3 Fatty Acids (OMEGA-3 FISH OIL PO) Take 1 g by mouth 2 times daily (with meals)            Allergies:      Allergies   Allergen Reactions     Penicillins      \"severe migraines\"            Past Medical History:     Past Medical History:   Diagnosis Date     Coronary artery disease     Mild to moderate CAD noted on 3/18/14 coronary angiogram      Heart palpitations      History of angina      Irregular heartbeat      Mitral valve disorders(424.0)      Other premature beats      Unspecified essential hypertension          Past Surgical History:     Past Surgical History:   Procedure Laterality Date     C NONSPECIFIC PROCEDURE      S/P T&A     C NONSPECIFIC PROCEDURE      LASER FIBROIDS     C NONSPECIFIC PROCEDURE       x2     COLONOSCOPY  2012    Procedure:COLONOSCOPY; COLONOSCOPY; Surgeon:GINGER PARKS; Location: GI     COLONOSCOPY N/A 2017    " Procedure: COLONOSCOPY;  colonoscopy;  Surgeon: Dez Siegel MD;  Location:  GI     CORONARY ANGIOGRAPHY ADULT ORDER  3/18/14    3/18/14- Mild to moderate CAD, no interventions, treat medically.     ENT SURGERY  tonsils     GYN SURGERY  c sections         Family Medical History:     Family History   Problem Relation Age of Onset     C.A.D. Mother      Parkinsonism Mother 80     Alcohol/Drug Daughter 30     Circulatory Maternal Grandmother         MVP     Circulatory Maternal Aunt         MVP         Social History:     Social History     Socioeconomic History     Marital status:      Spouse name: Not on file     Number of children: Not on file     Years of education: Not on file     Highest education level: Not on file   Social Needs     Financial resource strain: Not on file     Food insecurity - worry: Not on file     Food insecurity - inability: Not on file     Transportation needs - medical: Not on file     Transportation needs - non-medical: Not on file   Occupational History     Not on file   Tobacco Use     Smoking status: Never Smoker     Smokeless tobacco: Never Used   Substance and Sexual Activity     Alcohol use: Yes     Alcohol/week: 0.0 oz     Comment: 1 glass wine/day     Drug use: No     Sexual activity: Yes     Partners: Male   Other Topics Concern     Parent/sibling w/ CABG, MI or angioplasty before 65F 55M? Not Asked      Service Not Asked     Blood Transfusions Not Asked     Caffeine Concern No     Comment: coffee: 2 cups a day     Occupational Exposure Not Asked     Hobby Hazards Not Asked     Sleep Concern No     Stress Concern No     Weight Concern Not Asked     Special Diet Yes     Comment: little red meat, a lot of veggies & fruit     Back Care Not Asked     Exercise Yes     Comment: walk and weight lifiting 5-6 x a week     Bike Helmet Not Asked     Seat Belt Not Asked     Self-Exams Not Asked   Social History Narrative          had MI 47         "Remarried           Review of System:     Constitutional: Negative for fever or chills  Skin: Negative for rashes  Ears/Nose/Throat: Negative for nasal congestion, sore throat  Respiratory: No shortness of breath, dyspnea on exertion, cough, or hemoptysis  Cardiovascular: Negative for chest pain  Gastrointestinal: Negative for nausea, vomiting  Genitourinary: Negative for dysuria, hematuria  Musculoskeletal: positive for mechanical right foot pains  Neurologic: Negative for headaches  Psychiatric: Negative for depression, anxiety  Hematologic/Lymphatic/Immunologic: Negative  Endocrine: Negative  Behavioral: Negative for tobacco use       Physical Exam:   /74 (BP Location: Left arm, Patient Position: Sitting, Cuff Size: Adult Regular)   Pulse 63   Temp 96.8  F (36  C) (Oral)   Ht 1.676 m (5' 6\")   Wt 64 kg (141 lb)   SpO2 100%   BMI 22.76 kg/m      GENERAL: alert and no distress  EYES: eyes grossly normal to inspection, and conjunctivae and sclerae normal  HENT: Normocephalic atraumatic. Nose and mouth without ulcers or lesions  NECK: supple  RESP: lungs clear to auscultation   CV: regular rate and rhythm, normal S1 S2  LYMPH: no peripheral edema   ABDOMEN: nondistended  MS:right foot pains noted in the mid foot without signs of acute trauma or injuries of the foot  SKIN: no suspicious lesions or rashes  NEURO: Alert & Oriented x 3.   PSYCH: mentation appears normal, affect normal        Diagnostic Test Results:     Diagnostic Test Results:  Results for orders placed or performed in visit on 02/12/18   Lipid panel reflex to direct LDL Non-fasting   Result Value Ref Range    Cholesterol 165 <200 mg/dL    Triglycerides 97 <150 mg/dL    HDL Cholesterol 81 >49 mg/dL    LDL Cholesterol Calculated 65 <100 mg/dL    Non HDL Cholesterol 84 <130 mg/dL   Comprehensive metabolic panel   Result Value Ref Range    Sodium 140 133 - 144 mmol/L    Potassium 3.7 3.4 - 5.3 mmol/L    Chloride 106 94 - 109 mmol/L    Carbon " Dioxide 26 20 - 32 mmol/L    Anion Gap 8 3 - 14 mmol/L    Glucose 84 70 - 99 mg/dL    Urea Nitrogen 13 7 - 30 mg/dL    Creatinine 0.71 0.52 - 1.04 mg/dL    GFR Estimate 82 >60 mL/min/1.7m2    GFR Estimate If Black >90 >60 mL/min/1.7m2    Calcium 9.7 8.5 - 10.1 mg/dL    Bilirubin Total 0.6 0.2 - 1.3 mg/dL    Albumin 4.4 3.4 - 5.0 g/dL    Protein Total 7.4 6.8 - 8.8 g/dL    Alkaline Phosphatase 76 40 - 150 U/L    ALT 29 0 - 50 U/L    AST 25 0 - 45 U/L   Vitamin B12   Result Value Ref Range    Vitamin B12 574 193 - 986 pg/mL   Ferritin   Result Value Ref Range    Ferritin 40 8 - 252 ng/mL   Zinc   Result Value Ref Range    Zinc 70 60 - 120 ug/dL       ASSESSMENT/PLAN:       (M79.671) Right foot pain  (primary encounter diagnosis)  Comment: chronic right foot pains possibly due to overuse injury from walking and running.  Plan: PODIATRY/FOOT & ANKLE SURGERY REFERRAL, XR Foot Right G/E 3 Views. In the meantime, I have advised the patient to take OTC pain medication including low dose tylenol for pain relief going forward.    Follow Up Plan:     Patient is instructed to return to Internal Medicine clinic for follow-up visit in 1 week.        Lilian Wang MD  Internal Medicine  [facility]

## 2019-02-20 DIAGNOSIS — I25.10 MILD CAD: ICD-10-CM

## 2019-02-21 ENCOUNTER — OFFICE VISIT (OUTPATIENT)
Dept: PODIATRY | Facility: CLINIC | Age: 69
End: 2019-02-21
Payer: MEDICARE

## 2019-02-21 VITALS
BODY MASS INDEX: 22.66 KG/M2 | SYSTOLIC BLOOD PRESSURE: 153 MMHG | WEIGHT: 141 LBS | HEIGHT: 66 IN | DIASTOLIC BLOOD PRESSURE: 84 MMHG

## 2019-02-21 DIAGNOSIS — M25.571 SINUS TARSI SYNDROME OF RIGHT FOOT: ICD-10-CM

## 2019-02-21 DIAGNOSIS — M72.2 PLANTAR FASCIITIS: Primary | ICD-10-CM

## 2019-02-21 DIAGNOSIS — M77.51 TENDINITIS OF RIGHT FOOT: ICD-10-CM

## 2019-02-21 PROCEDURE — 99203 OFFICE O/P NEW LOW 30 MIN: CPT | Performed by: PODIATRIST

## 2019-02-21 RX ORDER — METOPROLOL TARTRATE 25 MG/1
TABLET, FILM COATED ORAL
Qty: 180 TABLET | Refills: 0 | Status: SHIPPED | OUTPATIENT
Start: 2019-02-21 | End: 2019-05-14

## 2019-02-21 ASSESSMENT — MIFFLIN-ST. JEOR: SCORE: 1191.32

## 2019-02-21 NOTE — PROGRESS NOTES
"Foot & Ankle Surgery  2019    CC: \"pain in right foot\"    I was asked to see Amaya Huff regarding the chief complaint by:  Dr. ALISHA Wang    HPI:  Pt is a 68 year old female who presents with above complaint.  3 mo history of right lower extremity pain.  Went for a 4 mile walk in November, took off her shoes and had extensive pain in the foot.  Initially her pain was in the arch, but she's been having dorsolateral R midfoot pain.  She stopped walking for 6 weeks, didn't help with overall pain levels.  Describes a deep ache and tingling.  She noticed some numbness plantar R great toe developing as well. History of lumbosacral spine disc bulge, had 3 closely-spaced steroid injections done a while ago with good results, although she gets an \"occasional\" pain.  Pain in right lower extremity 6/10 \"when walking\", worse with walking.  Has tried resting, OTC inserts from [employer], and bought slippers with improvement.      ROS:   Pos for CC.  The patient denies current nausea, vomiting, chills, fevers, belly pain, calf pain, chest pain or SOB.  Complete remainder of ROS is otherwise neg.    VITALS:    Vitals:    19 0802   BP: 153/84   Weight: 64 kg (141 lb)   Height: 1.676 m (5' 6\")       PMH:    Past Medical History:   Diagnosis Date     Coronary artery disease     Mild to moderate CAD noted on 3/18/14 coronary angiogram      Heart palpitations      History of angina      Irregular heartbeat      Mitral valve disorders(424.0)      Other premature beats      Unspecified essential hypertension        SXHX:    Past Surgical History:   Procedure Laterality Date     C NONSPECIFIC PROCEDURE      S/P T&A     C NONSPECIFIC PROCEDURE      LASER FIBROIDS     C NONSPECIFIC PROCEDURE       x2     COLONOSCOPY  2012    Procedure:COLONOSCOPY; COLONOSCOPY; Surgeon:GINGER PARKS; Location: GI     COLONOSCOPY N/A 2017    Procedure: COLONOSCOPY;  colonoscopy;  Surgeon: Robbin" "Dez Sanches MD;  Location:  GI     CORONARY ANGIOGRAPHY ADULT ORDER  3/18/14    3/18/14- Mild to moderate CAD, no interventions, treat medically.     ENT SURGERY  tonsils     GYN SURGERY  c sections        MEDS:    Current Outpatient Medications   Medication     aspirin 81 MG tablet     atorvastatin (LIPITOR) 20 MG tablet     Biotin 3 MG TABS     Calcium Carbonate-Vitamin D (CALCIUM + D PO)     Cholecalciferol (VITAMIN D3 PO)     metoprolol tartrate (LOPRESSOR) 25 MG tablet     Omega-3 Fatty Acids (OMEGA-3 FISH OIL PO)     No current facility-administered medications for this visit.        ALL:     Allergies   Allergen Reactions     Penicillins      \"severe migraines\"       FMH:    Family History   Problem Relation Age of Onset     C.A.D. Mother      Parkinsonism Mother 80     Alcohol/Drug Daughter 30     Circulatory Maternal Grandmother         MVP     Circulatory Maternal Aunt         MVP       SocHx:    Social History     Socioeconomic History     Marital status:      Spouse name: Not on file     Number of children: Not on file     Years of education: Not on file     Highest education level: Not on file   Occupational History     Not on file   Social Needs     Financial resource strain: Not on file     Food insecurity:     Worry: Not on file     Inability: Not on file     Transportation needs:     Medical: Not on file     Non-medical: Not on file   Tobacco Use     Smoking status: Never Smoker     Smokeless tobacco: Never Used   Substance and Sexual Activity     Alcohol use: Yes     Alcohol/week: 0.0 oz     Comment: 1 glass wine/day     Drug use: No     Sexual activity: Yes     Partners: Male   Lifestyle     Physical activity:     Days per week: Not on file     Minutes per session: Not on file     Stress: Not on file   Relationships     Social connections:     Talks on phone: Not on file     Gets together: Not on file     Attends [employer] service: Not on file     Active member of club or organization: " "Not on file     Attends meetings of clubs or organizations: Not on file     Relationship status: Not on file     Intimate partner violence:     Fear of current or ex partner: Not on file     Emotionally abused: Not on file     Physically abused: Not on file     Forced sexual activity: Not on file   Other Topics Concern     Parent/sibling w/ CABG, MI or angioplasty before 65F 55M? Not Asked      Service Not Asked     Blood Transfusions Not Asked     Caffeine Concern No     Comment: coffee: 2 cups a day     Occupational Exposure Not Asked     Hobby Hazards Not Asked     Sleep Concern No     Stress Concern No     Weight Concern Not Asked     Special Diet Yes     Comment: little red meat, a lot of veggies & fruit     Back Care Not Asked     Exercise Yes     Comment: walk and weight lifiting 5-6 x a week     Bike Helmet Not Asked     Seat Belt Not Asked     Self-Exams Not Asked   Social History Narrative          had MI 47        Remarried           EXAMINATION:  Gen:   No apparent distress  Neuro:   A&Ox3, no deficits  Psych:    Answering questions appropriately for age and situation with normal affect  Head:    NCAT  Eye:    Visual scanning without deficit  Ear:    Response to auditory stimuli wnl  Lung:    Non-labored breathing on RA noted  Abd:    NTND per patient report  Lymph:    Neg for pitting/non-pitting edema BLE  Vasc:    Pulses palpable, CFT minimally delayed  Neuro:    Light touch sensation intact to all sensory nerve distributions with paresthesias plantar R hallux  Derm:    Neg for nodules, lesions or ulcerations  MSK:    Right lower extremity - tender along plantar arch at PF central band, tender at 5th met base/P.brevis insertion, and tender at sinus tarsi without STJ crepitus.  Cavus foot structure.  TCST \"uncomfortable\" but no significant pain and no paresthesias/pain plantar hallux.    Calf:    Neg for redness, swelling or tenderness      Assessment:  68 year old female with " plantar fasciitis right lower extremity; P.brevis insertion tendinopathy right lower extremity; sinus tarsi syndrome right lower extremity       Plan:  Discussed etiologies, anatomy and options  1.  Plantar fasciitis right lower extremity   -Regarding the heel pain, the Plantar Fasciitis handout was dispensed and discussed.  We talked about stretching, resting/activity modification, icing, NSAID/tylenol use as tolerated, inserts, supportive/comfortable shoes and minimizing shoeless walking.    -discussed Achilles, plantar fascial and hamstring stretches  -OTC insert information dispensed and discussed. Likely ok to continue with her current pair from [employer], but we dispensed our handout  -consider custom orthotics in setting of pronounced cavus foot structure    2.  P.brevis insertion tendinopathy right lower extremity   -comfortable shoe gear  -OTC inserts   -RICE/NSAID vs tylenol prn  -discussed ankle brace/boot; declined today    3.  Sinus tarsi syndrome right lower extremity   -comfortable shoe gear  -RICE/NSAID vs tylenol prn; activity modifications!  -inserts for rearfoot motion control  -discussed steroid injection; declined today    4.  Plantar hallux numbness  -discussed role lower back can play in symptoms  -TCST uncomfortable, but no significant pain on exam  -considerdiagnostic/therapeutic steroid injection  -may be 2/2 to compensatory gait from above issues; hopefully will see improvement with addressing above issues, and if tarsal tunnel syndrome is diagnosis, above plan is initial therapies anyways      Follow up:  3 weeks or sooner with acute issues      Patient's medical history was reviewed today    Body mass index is 22.76 kg/m .          Kiel Zambrano DPM FACFAS FACFAOM  Podiatric Foot & Ankle Surgeon  [facility]  [phone]

## 2019-02-21 NOTE — PATIENT INSTRUCTIONS
Thank you for choosing [facility]!    DR. TORRES'S CLINIC LOCATIONS:   MONDAY - [place] TUESDAY - [place]   [address] [facility]  [address]   [place] [address] [place] [address]   [phone]       THURSDAY AM - [place] THURSDAY PM - [place]   [address]   [place] [address] [place] [address]   [phone]       FRIDAY AM - [place] SET UP SURGERY: [phone] [address] APPOINTMENTS: [phone]   [place] [address] BILLING QUESTIONS: [phone] FAX NUMBER: [phone]     Follow Up: 3 week follow up      FYI: Body Mass Index (BMI)  Many things can cause foot and ankle problems. Foot structure, activity level, foot mechanics and injuries are common causes of pain. One very important issue that often goes unmentioned, is body weight. Extra weight can cause increased stress on muscles, ligaments, bones and tendons. Sometimes just a few extra pounds is all it takes to put one over her/his threshold. Without reducing that stress, it can be difficult to alleviate pain. Some people are uncomfortable addressing this issue, but we feel it is important for you to think about it. As Foot &  Ankle specialists, our job is addressing the lower extremity problem and possible causes. Regarding extra body weight, we encourage patients to discuss diet and weight management plans with their primary care doctors. It is this team approach that gives you the best opportunity for pain relief and getting you back on your feet.    Patient to follow up with Primary Care provider regarding elevated blood pressure.            PLANTAR FASCIITIS   What is plantar fasciitis?   Plantar fasciitis is often referred to as heel spurs or heel pain. Plantar fasciitis is a very common problem that affects people of all foot shapes, age, weight and activity level. Pain may be in the arch or on the  weight-bearing surface of the heel. The pain maycome on without injury or identifiable cause. Pain is generally present when first getting out of bed in the morning or up from a seated break.   What causes plantar fasciitis?   The plantar fascia is a dense fibrous band of tissue that stretches across the bottom surface of the foot. The fascia helps support the foot muscles and arch. Plantar fasciitis is thought to be caused by mechanical strain or overload. Frequent walking without shoes or wearing unsupportive shoes is thought to cause structural overload and ultimately inflammation of the plantar fascia. Some people have heel spurs that can be seen on x-ray. The heel spur is actually a minor component of plantar fascitis and is largely ignored.   How long will this last?   Plantar fasciitis can last from one day to a lifetime. Some people get intermittent fascitis that is very short-lived. Others suffer daily for years. Excessive body weight, frequent bare foot walking, long hours on the feet, inadequate shoes, predisposing foot structures and excessive activity such as running are all potential issues that lead to chronic and/or recurring plantar fascitis. Having plantar fasciitis means that you are forever prone to this problem and will require modification of some of the above factors. Most people seek treatment within one to four months. Healing usually requires a similar one to four month time frame. Healing time is relative to the amount of effort spent treating the problem.   What can I do?   The easiest solution is to stop walking around your home without shoes. Plantar fasciitis is largely a shoe problem. Shoes are either not being worn often enough or your current shoes are inadequate for your weight, foot structure or activity level. The majority of shoes on the market today are not sufficient to resist development of plantar fasciitis or to promote healing. Assume that your current shoes are inadequate  and will need to be replaced. Even high quality shoes wear out with 6 months to one year of frequent use. Weightloss is another option. Losing ten pounds in the next two months may be enough to resolve the problem. Ice applied to the area of pain two to three times per day for ten minutes each session can be very helpful. This should continue until the problem resolves. Achilles tendon stretching is essential. Stretch multiple times daily to promote healing and to prevent recurrence in the future.     What if this does not help?   Medical treatments often include custom arch supports, cortisone injections, physical therapy, splints to be worn in bed, prescription medications and surgery. The home treatments listed above will be necessary regardless of these advanced medical treatments. Surgery is rarely needed but is very helpful in selected cases.     Heel pain in my future?   Plantar fasciitis is highly recurrent. Risk factors often continue, including return to bare foot walking, inadequate shoes, excessive body weight, excessive activities, etc. Your life style and foot structure may predispose you to recurrent plantar fasciitis. A daily prevention regimen can be very helpful. Ongoing use of shoe inserts, careful attention to appropriate shoes, daily Achilles stretching, etc. may prevent recurrence. Prompt attention at the earliest warning signs of heel pain can resolve the problem in as short as a few days.   Below are some exercises for Plantar fasciitis:  Stair exercise  You can step on the stairs with the ball of your foot and hold your position for at least 15 seconds, then slowly step down with the heels of your foot. You can do this daily and as often as you want. Plantar fasciitis exercise equipment and handout materials are useful in relieving pain.  Picking the towel  Plantar fasciitis exercise equipment and handout teach you how to exercise by picking the towel. You can sit comfortably and then pick  the towel with your toes. Do this at least 10 to 20 times regularly. You can use any object other than a towel as long as the material can be soft and you can pick it up with your toes.  Rolling the bottle or ball  You can get a small ball or bottle and then roll it with your foot. Do this daily for at least 15 to 20 times. Plantar fasciitis exercise equipment and handout are very useful in treating the symptoms of the foot condition.  Stretching the calf  You could lie supine, raise one foot, and then point your toes towards the floor. Do this daily for at least 15 to 20 times. The calf is connected to the heel and the balls of the foot, so you should also exercise this also. Plantar fasciitis exercise equipment and handout usually mentions the importance of exercising the calf also.  Flex the toes  Sit comfortably and then flex your toes by pointing it towards the floor or towards your body. This will relax and flex your foot and exercise your plantar fascia, the calf, and the Achilles tendon. The inability of the foot to stretch often causes the bunching up of the plantar fascia area leading to the pain.  Massaging the calf and the plantar fascia also helps a lot in alleviating the pain and preventing its recurrence. If you prefer standard plantar fasciitis exercise equipment and handout, then you can avail of this from legitimate sources. You can use the standard stretching device, the wheel, and the belt. These are all significant devices in treating the pain in the plantar fascia.    Therapies covered by Dr Zambrano today:    1.  Supportive shoes, minimizing barefoot ambulation - helps to provide cushion, padding and support to the ligament that is inflamed.  Socks, flip flops, flats and some slippers are not typically sufficient to provide support.  Shoes should be worn even in-doors  2.  Insert/orthotics - inserts/orthotics that have an arch support built in to them provide further stress relief for the  "ligament.  3. Icing - using a frozen water bottle or orange, and rolling it along the bottom of the arch/heel can help to alleviate discomfort, and can act as a tissue massage to the painful, inflamed ligament.  There is evidence that shows icing at least three times daily can be beneficial  4.  Anti-inflammatory(NSAIDS)/Tylenol - anti-inflammatories, such as ibuprofen or Aleve, as well as Tylenol can be used to help decrease symptoms and improve pain levels.  If you have high blood pressure, heart disease, stomach or kidney problems, use anti-inflammatories sparingly.  Tylenol should not be used if you have liver problems.  If you can safely taken them, you can use NSAIDS and tylenol in combination for pain relief  5. Activity modifications - if there are certain things that you do, whether it's going barefoot or certain shoes/activities, you should try to minimize those activities as much as possible until your symptoms are sufficiently resolved.  Certainly, some activities, such as running on the treadmill, are easier to take a break from versus others, such as work or chores at home.  \"If there are certain activities that hurt your heel, and you keep doing those activities that hurt your heel, your heel will keep hurting\".    6.  Stretching - Stretching your Achilles and hamstring can help to decrease stress on the plantar fascia.                   Hold each stretch for 10 seconds.  Stretch 10 times per set, three sets per day.  Morning, afternoon and evening.  If your heel pain is very severe in the morning, consider doing the first set of stretches before you get out of bed.            If these initial therapies are insufficient, we have our tier 2 therapies that can more aggressively work to improve your symptoms and get you back to the activities that you enjoy.      OVER THE COUNTER INSERTS    SuperFeet   Sofsole Fit Pressynco   Power Step   Walk-Fit Arch Cradles     Most of these can be found at your local " [employer], [employer], live[employer], or online:    1.  https://www.[employer]beam/en-us/  2.  Https://[employer]/  3.  Https://www.powersteps.com/    **A good high quality over the counter insert should cost around $40-$50

## 2019-02-21 NOTE — TELEPHONE ENCOUNTER
"Last Written Prescription Date:  6/11/18  Last Fill Quantity: 180 tablet,  # refills: 2   Last office visit: 2/12/2018 with prescribing provider:  Arpita   Future Office Visit:   Next 5 appointments (look out 90 days)    Mar 21, 2019 10:30 AM CDT  PHYSICAL with Carline Palm MD  [facility] [address] [facility] [id number] [phone]         Requested Prescriptions   Pending Prescriptions Disp Refills     metoprolol tartrate (LOPRESSOR) 25 MG tablet 180 tablet 2     Sig: TAKE 1 TABLET (25 MG) BY MOUTH 2 TIMES DAILY    Beta-Blockers Protocol Failed - 2/20/2019  4:44 PM       Failed - Blood pressure under 140/90 in past 12 months    BP Readings from Last 3 Encounters:   02/21/19 153/84   02/06/19 148/74   02/12/18 140/72                Passed - Patient is age 6 or older       Passed - Recent (12 mo) or future (30 days) visit within the authorizing provider's specialty    Patient had office visit in the last 12 months or has a visit in the next 30 days with authorizing provider or within the authorizing provider's specialty.  See \"Patient Info\" tab in inbasket, or \"Choose Columns\" in Meds & Orders section of the refill encounter.             Passed - Medication is active on med list          "

## 2019-02-21 NOTE — TELEPHONE ENCOUNTER
Medication is being filled for 1 time refill only due to:  Patient needs to be seen because Last BP elevated - has OV scheduled.  Nai BLOCK RN

## 2019-03-21 ENCOUNTER — OFFICE VISIT (OUTPATIENT)
Dept: FAMILY MEDICINE | Facility: CLINIC | Age: 69
End: 2019-03-21
Payer: MEDICARE

## 2019-03-21 VITALS
BODY MASS INDEX: 22.34 KG/M2 | DIASTOLIC BLOOD PRESSURE: 67 MMHG | HEIGHT: 66 IN | TEMPERATURE: 97.4 F | OXYGEN SATURATION: 100 % | WEIGHT: 139 LBS | SYSTOLIC BLOOD PRESSURE: 132 MMHG | HEART RATE: 64 BPM

## 2019-03-21 DIAGNOSIS — E78.5 HYPERLIPIDEMIA LDL GOAL <70: ICD-10-CM

## 2019-03-21 DIAGNOSIS — Z00.00 ROUTINE GENERAL MEDICAL EXAMINATION AT A HEALTH CARE FACILITY: Primary | ICD-10-CM

## 2019-03-21 DIAGNOSIS — I49.49 PREMATURE BEATS: ICD-10-CM

## 2019-03-21 DIAGNOSIS — E78.5 HYPERLIPIDEMIA LDL GOAL <130: ICD-10-CM

## 2019-03-21 DIAGNOSIS — I25.10 MILD CAD: ICD-10-CM

## 2019-03-21 LAB
ERYTHROCYTE [DISTWIDTH] IN BLOOD BY AUTOMATED COUNT: 12.7 % (ref 10–15)
HCT VFR BLD AUTO: 40.6 % (ref 35–47)
HGB BLD-MCNC: 13 G/DL (ref 11.7–15.7)
MCH RBC QN AUTO: 28.2 PG (ref 26.5–33)
MCHC RBC AUTO-ENTMCNC: 32 G/DL (ref 31.5–36.5)
MCV RBC AUTO: 88 FL (ref 78–100)
PLATELET # BLD AUTO: 257 10E9/L (ref 150–450)
RBC # BLD AUTO: 4.61 10E12/L (ref 3.8–5.2)
WBC # BLD AUTO: 5.4 10E9/L (ref 4–11)

## 2019-03-21 PROCEDURE — 80053 COMPREHEN METABOLIC PANEL: CPT | Performed by: INTERNAL MEDICINE

## 2019-03-21 PROCEDURE — 85027 COMPLETE CBC AUTOMATED: CPT | Performed by: INTERNAL MEDICINE

## 2019-03-21 PROCEDURE — 87624 HPV HI-RISK TYP POOLED RSLT: CPT | Performed by: INTERNAL MEDICINE

## 2019-03-21 PROCEDURE — 36415 COLL VENOUS BLD VENIPUNCTURE: CPT | Performed by: INTERNAL MEDICINE

## 2019-03-21 PROCEDURE — G0145 SCR C/V CYTO,THINLAYER,RESCR: HCPCS | Performed by: INTERNAL MEDICINE

## 2019-03-21 PROCEDURE — G0402 INITIAL PREVENTIVE EXAM: HCPCS | Performed by: INTERNAL MEDICINE

## 2019-03-21 PROCEDURE — 80061 LIPID PANEL: CPT | Performed by: INTERNAL MEDICINE

## 2019-03-21 RX ORDER — ATORVASTATIN CALCIUM 20 MG/1
20 TABLET, FILM COATED ORAL DAILY
Qty: 90 TABLET | Refills: 3 | Status: SHIPPED | OUTPATIENT
Start: 2019-03-21 | End: 2020-04-16

## 2019-03-21 RX ORDER — FLUOROURACIL 50 MG/ML
SOLUTION TOPICAL
COMMUNITY
End: 2022-04-18

## 2019-03-21 RX ORDER — TRIAMCINOLONE ACETONIDE 1 MG/G
CREAM TOPICAL EVERY OTHER DAY
COMMUNITY
End: 2022-04-18

## 2019-03-21 ASSESSMENT — MIFFLIN-ST. JEOR: SCORE: 1178.28

## 2019-03-21 NOTE — NURSING NOTE
Pt was discharged without vision check.  She had mentioned she recently had checked at [employer].  I called pt and requested she send us results via MY CHART.  In the mean time Dr. Palm had filled in.

## 2019-03-21 NOTE — PATIENT INSTRUCTIONS
Preventive Health Recommendations    See your health care provider every year to    Review health changes.     Discuss preventive care.      Review your medicines if your doctor has prescribed any.      You no longer need a yearly Pap test unless you've had an abnormal Pap test in the past 10 years. If you have vaginal symptoms, such as bleeding or discharge, be sure to talk with your provider about a Pap test.      Every 1 to 2 years, have a mammogram.  If you are over 69, talk with your health care provider about whether or not you want to continue having screening mammograms.      Every 10 years, have a colonoscopy. Or, have a yearly FIT test (stool test). These exams will check for colon cancer.       Have a cholesterol test every 5 years, or more often if your doctor advises it.       Have a diabetes test (fasting glucose) every three years. If you are at risk for diabetes, you should have this test more often.       At age 65, have a bone density scan (DEXA) to check for osteoporosis (brittle bone disease).    Shots:    Get a flu shot each year.    Get a tetanus shot every 10 years.    Talk to your doctor about your pneumonia vaccines. There are now two you should receive - Pneumovax (PPSV 23) and Prevnar (PCV 13).    Talk to your pharmacist about the shingles vaccine.    Talk to your doctor about the hepatitis B vaccine.    Nutrition:     Eat at least 5 servings of fruits and vegetables each day.      Eat whole-grain bread, whole-wheat pasta and brown rice instead of white grains and rice.      Get adequate Calcium and Vitamin D.     Lifestyle    Exercise at least 150 minutes a week (30 minutes a day, 5 days a week). This will help you control your weight and prevent disease.      Limit alcohol to one drink per day.      No smoking.       Wear sunscreen to prevent skin cancer.       See your dentist twice a year for an exam and cleaning.      See your eye doctor every 1 to 2 years to screen for conditions  such as glaucoma, macular degeneration and cataracts.

## 2019-03-21 NOTE — PROGRESS NOTES
"  SUBJECTIVE:   Amaya Huff is a 68 year old female who presents for Preventive Visit.    Big toe is numb on rt side  No new symptoms   Are you in the first 12 months of your Medicare Part B coverage?  Yes,  Visual Acuity:  Right Eye: 20/20 with glasses   Left Eye: 20/20  Both Eyes: 20/20    Physical Health:    In general, how would you rate your overall physical health? excellent    Outside of work, how many days during the week do you exercise? 6-7 days/week    Outside of work, approximately how many minutes a day do you exercise?greater than 60 minutes    If you drink alcohol do you typically have >3 drinks per day or >7 drinks per week? No    Do you usually eat at least 4 servings of fruit and vegetables a day, include whole grains & fiber and avoid regularly eating high fat or \"junk\" foods? Yes    Do you have any problems taking medications regularly?  No    Do you have any side effects from medications? none    Needs assistance for the following daily activities: no assistance needed    Which of the following safety concerns are present in your home?  none identified     Hearing impairment: No  In the past 6 months, have you been bothered by leaking of urine? No unless sneeze  Mental Health:    In general, how would you rate your overall mental or emotional health? excellent  PHQ-2 Score:      Do you feel safe in your environment? Yes    Do you have a Health Care Directive? Yes: Patient states has Advance Directive and will bring in a copy to clinic.    Additional concerns to address?  No    Fall risk:  Fallen 2 or more times in the past year?: No  Any fall with injury in the past year?: No    Cognitive Screenin) Repeat 3 items (Leader, Season, Table)    2) Clock draw: NORMAL  3) 3 item recall: Recalls 3 objects  Results: 3 items recalled: COGNITIVE IMPAIRMENT LESS LIKELY    Mini-CogTM Copyright RICHMOND Carrera. Licensed by the author for use in [facility]; reprinted with permission " (zuleyma@[facility]). All rights reserved.      Do you have sleep apnea, excessive snoring or daytime drowsiness?: no            Reviewed and updated as needed this visit by clinical staff  Tobacco  Allergies  Meds  Problems  Med Hx  Surg Hx  Fam Hx         Reviewed and updated as needed this visit by Provider  Tobacco  Allergies  Meds  Problems  Med Hx  Surg Hx  Fam Hx        Social History     Tobacco Use     Smoking status: Never Smoker     Smokeless tobacco: Never Used   Substance Use Topics     Alcohol use: Yes     Alcohol/week: 0.0 oz     Comment: 1 glass wine/day                           Current providers sharing in care for this patient include:   Patient Care Team:  Carline Palm MD as PCP - General (Internal Medicine)  Lilian Wang MD as Assigned PCP    The following health maintenance items are reviewed in Epic and correct as of today:  Health Maintenance   Topic Date Due     ZOSTER IMMUNIZATION (2 of 3) 2015     MEDICARE ANNUAL WELLNESS VISIT  2019     LIPID MONITORING Q1 YEAR  2019     ADVANCE DIRECTIVE PLANNING Q5 YRS  2020     MAMMO Q1 YR  01/15/2020     FALL RISK ASSESSMENT  2020     PHQ-2 Q1 YR  2020     DTAP/TDAP/TD IMMUNIZATION (2 - Td) 2024     COLON CANCER SCREEN (SYSTEM ASSIGNED)  2027     DEXA SCAN SCREENING (SYSTEM ASSIGNED)  Completed     INFLUENZA VACCINE  Completed     HEPATITIS C SCREENING  Completed     IPV IMMUNIZATION  Aged Out     MENINGITIS IMMUNIZATION  Aged Out     Patient Active Problem List   Diagnosis     Abdominal pain     Premature beats     Mitral valve disorder     Advanced directives, counseling/discussion     Mild CAD     SBO (small bowel obstruction) (H)     Past Surgical History:   Procedure Laterality Date     C NONSPECIFIC PROCEDURE      S/P T&A     C NONSPECIFIC PROCEDURE      LASER FIBROIDS     C NONSPECIFIC PROCEDURE       x2     COLONOSCOPY  2012    Procedure:COLONOSCOPY; COLONOSCOPY;  "Surgeon:GINGER PARKS; Location: GI     COLONOSCOPY N/A 6/30/2017    Procedure: COLONOSCOPY;  colonoscopy;  Surgeon: Dze Siegel MD;  Location:  GI     CORONARY ANGIOGRAPHY ADULT ORDER  3/18/14    3/18/14- Mild to moderate CAD, no interventions, treat medically.     ENT SURGERY  tonsils     GYN SURGERY  c sections       Social History     Tobacco Use     Smoking status: Never Smoker     Smokeless tobacco: Never Used   Substance Use Topics     Alcohol use: Yes     Alcohol/week: 0.0 oz     Comment: 1 glass wine/day     Family History   Problem Relation Age of Onset     C.A.D. Mother      Parkinsonism Mother 80     Alcohol/Drug Daughter 30     Circulatory Maternal Grandmother         MVP     Circulatory Maternal Aunt         MVP         Current Outpatient Medications   Medication Sig Dispense Refill     atorvastatin (LIPITOR) 20 MG tablet Take 1 tablet (20 mg) by mouth daily 90 tablet 3     COLLAGEN PO        fluorouracil 5 % SOLN Apply to facial spots on Saturday & Sunday       triamcinolone (KENALOG) 0.1 % external cream Apply topically every other day       aspirin 81 MG tablet Take 81 mg by mouth daily       Biotin 3 MG TABS Take 1,000 mcg by mouth daily        Calcium Carbonate-Vitamin D (CALCIUM + D PO) Take 1 chew tab by mouth 2 times daily 1300 mg daily  (650 each)       Cholecalciferol (VITAMIN D3 PO) Take 5,000 Units by mouth daily        metoprolol tartrate (LOPRESSOR) 25 MG tablet TAKE 1 TABLET (25 MG) BY MOUTH 2 TIMES DAILY 180 tablet 0     Omega-3 Fatty Acids (OMEGA-3 FISH OIL PO) Take 1 g by mouth 2 times daily (with meals) 1280  Mg           ROS:  Constitutional, HEENT, cardiovascular, pulmonary, GI, , musculoskeletal, neuro, skin, endocrine and psych systems are negative, except as otherwise noted.    OBJECTIVE:   /67 (BP Location: Right arm, Patient Position: Chair, Cuff Size: Adult Regular)   Pulse 64   Temp 97.4  F (36.3  C) (Oral)   Ht 1.67 m (5' 5.75\")   Wt 63 kg " "(139 lb)   SpO2 100%   BMI 22.61 kg/m   Estimated body mass index is 22.61 kg/m  as calculated from the following:    Height as of this encounter: 1.67 m (5' 5.75\").    Weight as of this encounter: 63 kg (139 lb).  EXAM:   GENERAL APPEARANCE: healthy, alert and no distress  EYES: Eyes grossly normal to inspection, PERRL and conjunctivae and sclerae normal  HENT: ear canals and TM's normal, nose and mouth without ulcers or lesions, oropharynx clear and oral mucous membranes moist  NECK: no adenopathy, no asymmetry, masses, or scars and thyroid normal to palpation  RESP: lungs clear to auscultation - no rales, rhonchi or wheezes  BREAST: normal without masses, tenderness or nipple discharge and no palpable axillary masses or adenopathy  CV: regular rate and rhythm, normal S1 S2, no S3 or S4, no murmur, click or rub, no peripheral edema and peripheral pulses strong  ABDOMEN: soft, nontender, no hepatosplenomegaly, no masses and bowel sounds normal   (female): normal female external genitalia, normal urethral meatus, vaginal mucosal atrophy noted, normal cervix, adnexae, and uterus without masses or abnormal discharge  MS: no musculoskeletal defects are noted and gait is age appropriate without ataxia  SKIN: no suspicious lesions or rashes  Many benign lesions  NEURO: Normal strength and tone, sensory exam grossly normal, mentation intact and speech normal  PSYCH: mentation appears normal and affect normal/bright        ASSESSMENT / PLAN:   Amaya was seen today for wellness visit.    Diagnoses and all orders for this visit:    Routine general medical examination at a health care facility  -     Pap imaged thin layer screen with HPV - recommended age 30 - 65 years (select HPV order below)  Mammogram normal  Colon exam done in 2017  Will watch for numb toe, may need work up   Has seen podiatry  Mild CAD  -     CBC with platelets  No symptoms   On statins, asa, BB  Hyperlipidemia LDL goal <70  -     Lipid panel " "reflex to direct LDL Fasting  -     Comprehensive metabolic panel    Premature beats  -     Comprehensive metabolic panel  -     CBC with platelets  No new symptoms   Hyperlipidemia LDL goal <130  -     atorvastatin (LIPITOR) 20 MG tablet; Take 1 tablet (20 mg) by mouth daily    Will take shingrix  Continue to follow up with derm for skin lesions    End of Life Planning:  Patient currently has an advanced directive: No.  I have verified the patient's ablity to prepare an advanced directive/make health care decisions.  Literature was provided to assist patient in preparing an advanced directive.    COUNSELING:  Reviewed preventive health counseling, as reflected in patient instructions       Regular exercise       Healthy diet/nutrition    BP Readings from Last 1 Encounters:   03/21/19 132/67     Estimated body mass index is 22.61 kg/m  as calculated from the following:    Height as of this encounter: 1.67 m (5' 5.75\").    Weight as of this encounter: 63 kg (139 lb).           reports that  has never smoked. she has never used smokeless tobacco.      Appropriate preventive services were discussed with this patient, including applicable screening as appropriate for cardiovascular disease, diabetes, osteopenia/osteoporosis, and glaucoma.  As appropriate for age/gender, discussed screening for colorectal cancer,breast cancer, and cervical cancer. Checklist reviewing preventive services available has been given to the patient.    Reviewed patients plan of care and provided an AVS. The Basic Care Plan (routine screening as documented in Health Maintenance) for Amaya meets the Care Plan requirement. This Care Plan has been established and reviewed with the Patient.    Counseling Resources:  ATP IV Guidelines  Pooled Cohorts Equation Calculator  Breast Cancer Risk Calculator  FRAX Risk Assessment  ICSI Preventive Guidelines  Dietary Guidelines for Americans, 2010  USDA's MyPlate  ASA Prophylaxis  Lung CA " Screening    Carline Palm MD  [facility]

## 2019-03-22 LAB
ALBUMIN SERPL-MCNC: 4 G/DL (ref 3.4–5)
ALP SERPL-CCNC: 73 U/L (ref 40–150)
ALT SERPL W P-5'-P-CCNC: 27 U/L (ref 0–50)
ANION GAP SERPL CALCULATED.3IONS-SCNC: 6 MMOL/L (ref 3–14)
AST SERPL W P-5'-P-CCNC: 25 U/L (ref 0–45)
BILIRUB SERPL-MCNC: 0.6 MG/DL (ref 0.2–1.3)
BUN SERPL-MCNC: 17 MG/DL (ref 7–30)
CALCIUM SERPL-MCNC: 9.6 MG/DL (ref 8.5–10.1)
CHLORIDE SERPL-SCNC: 107 MMOL/L (ref 94–109)
CHOLEST SERPL-MCNC: 165 MG/DL
CO2 SERPL-SCNC: 27 MMOL/L (ref 20–32)
CREAT SERPL-MCNC: 0.75 MG/DL (ref 0.52–1.04)
GFR SERPL CREATININE-BSD FRML MDRD: 82 ML/MIN/{1.73_M2}
GLUCOSE SERPL-MCNC: 95 MG/DL (ref 70–99)
HDLC SERPL-MCNC: 74 MG/DL
LDLC SERPL CALC-MCNC: 74 MG/DL
NONHDLC SERPL-MCNC: 91 MG/DL
POTASSIUM SERPL-SCNC: 3.7 MMOL/L (ref 3.4–5.3)
PROT SERPL-MCNC: 7.3 G/DL (ref 6.8–8.8)
SODIUM SERPL-SCNC: 140 MMOL/L (ref 133–144)
TRIGL SERPL-MCNC: 85 MG/DL

## 2019-03-26 LAB
COPATH REPORT: NORMAL
PAP: NORMAL

## 2019-03-27 LAB
FINAL DIAGNOSIS: NORMAL
HPV HR 12 DNA CVX QL NAA+PROBE: NEGATIVE
HPV16 DNA SPEC QL NAA+PROBE: NEGATIVE
HPV18 DNA SPEC QL NAA+PROBE: NEGATIVE
SPECIMEN DESCRIPTION: NORMAL
SPECIMEN SOURCE CVX/VAG CYTO: NORMAL

## 2019-04-04 ENCOUNTER — OFFICE VISIT (OUTPATIENT)
Dept: URGENT CARE | Facility: URGENT CARE | Age: 69
End: 2019-04-04
Payer: MEDICARE

## 2019-04-04 ENCOUNTER — TELEPHONE (OUTPATIENT)
Dept: FAMILY MEDICINE | Facility: CLINIC | Age: 69
End: 2019-04-04

## 2019-04-04 VITALS
TEMPERATURE: 102.2 F | HEART RATE: 103 BPM | DIASTOLIC BLOOD PRESSURE: 74 MMHG | OXYGEN SATURATION: 98 % | SYSTOLIC BLOOD PRESSURE: 120 MMHG

## 2019-04-04 DIAGNOSIS — R05.8 DRY COUGH: ICD-10-CM

## 2019-04-04 DIAGNOSIS — R09.89 RUNNY NOSE: ICD-10-CM

## 2019-04-04 DIAGNOSIS — J10.1 INFLUENZA A: ICD-10-CM

## 2019-04-04 DIAGNOSIS — R50.9 FEVER IN ADULT: Primary | ICD-10-CM

## 2019-04-04 LAB
FLUAV+FLUBV AG SPEC QL: NEGATIVE
FLUAV+FLUBV AG SPEC QL: POSITIVE
SPECIMEN SOURCE: ABNORMAL

## 2019-04-04 PROCEDURE — 87804 INFLUENZA ASSAY W/OPTIC: CPT | Performed by: FAMILY MEDICINE

## 2019-04-04 PROCEDURE — 99213 OFFICE O/P EST LOW 20 MIN: CPT | Performed by: FAMILY MEDICINE

## 2019-04-04 RX ORDER — OSELTAMIVIR PHOSPHATE 75 MG/1
75 CAPSULE ORAL 2 TIMES DAILY
Qty: 10 CAPSULE | Refills: 0 | Status: SHIPPED | OUTPATIENT
Start: 2019-04-04 | End: 2019-04-09

## 2019-04-04 NOTE — PROGRESS NOTES
Chief Complaint   Patient presents with     Urgent Care     Flu     fever, aches, cough, insomina x2days     SUBJECTIVE:   Amaya Huff is a 68 year old female presenting with a chief complaint of runny nose, stuffy nose, cough - non-productive, sore throat and body aches. She is an established patient of [facility].  Onset of symptoms was 2 day(s) ago.  Course of illness is worsening.    Severity moderate  Current and Associated symptoms: fever, chills, headache and body aches  Treatment measures tried include aspirin   Predisposing factors include None.    Past Medical History:   Diagnosis Date     Coronary artery disease     Mild to moderate CAD noted on 3/18/14 coronary angiogram      Heart palpitations      History of angina      Irregular heartbeat      Mitral valve disorders(424.0)      Other premature beats      Unspecified essential hypertension 1995     Current Outpatient Medications   Medication Sig Dispense Refill     aspirin 81 MG tablet Take 81 mg by mouth daily       atorvastatin (LIPITOR) 20 MG tablet Take 1 tablet (20 mg) by mouth daily 90 tablet 3     Biotin 3 MG TABS Take 1,000 mcg by mouth daily        Calcium Carbonate-Vitamin D (CALCIUM + D PO) Take 1 chew tab by mouth 2 times daily 1300 mg daily  (650 each)       Cholecalciferol (VITAMIN D3 PO) Take 5,000 Units by mouth daily        COLLAGEN PO        fluorouracil 5 % SOLN Apply to facial spots on Saturday & Sunday       metoprolol tartrate (LOPRESSOR) 25 MG tablet TAKE 1 TABLET (25 MG) BY MOUTH 2 TIMES DAILY 180 tablet 0     Omega-3 Fatty Acids (OMEGA-3 FISH OIL PO) Take 1 g by mouth 2 times daily (with meals) 1280  Mg       triamcinolone (KENALOG) 0.1 % external cream Apply topically every other day       Social History     Tobacco Use     Smoking status: Never Smoker     Smokeless tobacco: Never Used   Substance Use Topics     Alcohol use: Yes     Alcohol/week: 0.0 oz     Comment: 1 glass wine/day     Family History   Problem Relation  Age of Onset     C.A.D. Mother      Parkinsonism Mother 80     Alcohol/Drug Daughter 30     Circulatory Maternal Grandmother         MVP     Circulatory Maternal Aunt         MVP         ROS:    10 point ROS of systems including Constitutional, Eyes,Cardiovascular, Gastroenterology, Genitourinary, Integumentary, Muscularskeletal, Psychiatric were all negative except for pertinent positives noted in my HPI         OBJECTIVE:  /74   Pulse 103   Temp 102.2  F (39  C) (Oral)   SpO2 98%   GENERAL APPEARANCE: healthy, alert and no distress  EYES: EOMI,  PERRL, conjunctiva clear  HENT: ear canals and TM's normal.  Nose and mouth without ulcers, erythema or lesions  NECK: supple, nontender, no lymphadenopathy  RESP: lungs clear to auscultation - no rales, rhonchi or wheezes  CV: regular rates and rhythm, normal S1 S2, no murmur noted  ABDOMEN:  soft, nontender, no HSM or masses and bowel sounds normal  SKIN: no suspicious lesions or rashes  PSYCH: mentation appears normal  Physical Exam      X-Ray was not done.    Medical Decision Making:    Differential Diagnosis:  URI Adult/Peds:  Bronchitis-viral, Influenza, Tonsilitis, Viral pharyngitis, Viral syndrome and Viral upper respiratory illness      ASSESSMENT:  Amaya was seen today for urgent care and flu.    Diagnoses and all orders for this visit:    Fever in adult  -     Influenza A/B antigen    Dry cough      Results for orders placed or performed in visit on 04/04/19   Influenza A/B antigen   Result Value Ref Range    Influenza A/B Agn Specimen Nasal     Influenza A Positive (A) NEG^Negative    Influenza B Negative NEG^Negative       PLAN:  Tylenol, Fluids, Rest, Saline gargles, Saline nasal spray and Vaporizer  Discussed with patient of the positive influenza result  We will treat with Tamiflu for 5 days notify patient that she is contagious so should cover her cough and also wash her hands very well  Follow up if  symptoms fail to improve or worsens   Pt  understood and agreed with plan       Danielle Slade MD     See orders in Epic

## 2019-04-04 NOTE — TELEPHONE ENCOUNTER
Reason for call:  Patient reporting a symptom    Symptom or request: influenza like symptoms, daughter confirmed influenza a today, spent time together on Tue & Wed.  Temp 104 yesterday, 102 today w/aspirin    Duration (how long have symptoms been present): symptoms started Tue, but fever started yesterday.     Have you been treated for this before? No    Additional comments: wondering if something could be ordered to help with symptoms or if visit needed    Phone Number patient can be reached at:  Home number on file [phone] (home)    Best Time:  any    Can we leave a detailed message on this number:  YES    Call taken on 4/4/2019 at 4:16 PM by Fanta Holden

## 2019-04-08 ENCOUNTER — TELEPHONE (OUTPATIENT)
Dept: FAMILY MEDICINE | Facility: CLINIC | Age: 69
End: 2019-04-08

## 2019-04-08 NOTE — TELEPHONE ENCOUNTER
Wednesday: Onset of fever  Thursday afternoon: Started Tamiflu    Advised she may still be contagious (through coughs/sneezes).       Pt was going to visit 90 yo mother.     Advised to avoid for at least the next couple of days, even through this week to be safe.     Pt agreed with plan. If HAVE to visit, MASK and frequent hand washing    Diana GONZALEZ RN

## 2019-04-08 NOTE — TELEPHONE ENCOUNTER
Reason for Call:  Other     Detailed comments: Pt would like to know how long she will be contagious after starting tamiflu    Phone Number Patient can be reached at: Home number on file [phone] (home)    Best Time: any    Can we leave a detailed message on this number? YES    Call taken on 4/8/2019 at 9:12 AM by Megan Wetzel

## 2019-05-14 DIAGNOSIS — I25.10 MILD CAD: ICD-10-CM

## 2019-05-14 NOTE — TELEPHONE ENCOUNTER
"  metoprolol tartrate (LOPRESSOR) 25 MG tablet 180 tablet 0 2/21/2019         Last Written Prescription Date:  02/21/2019  Last Fill Quantity: 180,  # refills: 0   Last office visit: 3/21/2019 with prescribing provider:     Future Office Visit:  Unknown    Requested Prescriptions   Pending Prescriptions Disp Refills     metoprolol tartrate (LOPRESSOR) 25 MG tablet [Pharmacy Med Name: METOPROLOL TARTRATE 25 MG TAB] 180 tablet 0     Sig: TAKE 1 TABLET BY MOUTH 2 TIMES DAILY       Beta-Blockers Protocol Passed - 5/14/2019  1:31 PM        Passed - Blood pressure under 140/90 in past 12 months     BP Readings from Last 3 Encounters:   04/04/19 120/74   03/21/19 132/67   02/21/19 153/84                 Passed - Patient is age 6 or older        Passed - Recent (12 mo) or future (30 days) visit within the authorizing provider's specialty     Patient had office visit in the last 12 months or has a visit in the next 30 days with authorizing provider or within the authorizing provider's specialty.  See \"Patient Info\" tab in inbasket, or \"Choose Columns\" in Meds & Orders section of the refill encounter.              Passed - Medication is active on med list          "

## 2019-05-15 RX ORDER — METOPROLOL TARTRATE 25 MG/1
TABLET, FILM COATED ORAL
Qty: 180 TABLET | Refills: 2 | Status: SHIPPED | OUTPATIENT
Start: 2019-05-15 | End: 2020-02-10

## 2019-05-15 NOTE — TELEPHONE ENCOUNTER
Prescription approved per [facility] Refill Protocol.  Dona BRIDGES RN

## 2019-09-30 ENCOUNTER — HEALTH MAINTENANCE LETTER (OUTPATIENT)
Age: 69
End: 2019-09-30

## 2019-11-19 ENCOUNTER — OFFICE VISIT (OUTPATIENT)
Dept: CARDIOLOGY | Facility: CLINIC | Age: 69
End: 2019-11-19
Payer: MEDICARE

## 2019-11-19 VITALS
HEART RATE: 62 BPM | WEIGHT: 135.9 LBS | BODY MASS INDEX: 22.64 KG/M2 | DIASTOLIC BLOOD PRESSURE: 82 MMHG | HEIGHT: 65 IN | SYSTOLIC BLOOD PRESSURE: 150 MMHG

## 2019-11-19 DIAGNOSIS — E78.5 HYPERLIPIDEMIA LDL GOAL <130: ICD-10-CM

## 2019-11-19 DIAGNOSIS — I10 ESSENTIAL HYPERTENSION: ICD-10-CM

## 2019-11-19 DIAGNOSIS — I25.10 CORONARY ARTERY DISEASE INVOLVING NATIVE CORONARY ARTERY OF NATIVE HEART WITHOUT ANGINA PECTORIS: Primary | ICD-10-CM

## 2019-11-19 PROCEDURE — 99214 OFFICE O/P EST MOD 30 MIN: CPT | Performed by: INTERNAL MEDICINE

## 2019-11-19 ASSESSMENT — MIFFLIN-ST. JEOR: SCORE: 1147.32

## 2019-11-19 NOTE — PROGRESS NOTES
"HPI and Plan:   See dictation    Orders Placed This Encounter   Procedures     Follow-Up with Cardiologist       No orders of the defined types were placed in this encounter.      There are no discontinued medications.      Encounter Diagnoses   Name Primary?     Hyperlipidemia LDL goal <130      Coronary artery disease involving native coronary artery of native heart without angina pectoris Yes     Essential hypertension        CURRENT MEDICATIONS:  Current Outpatient Medications   Medication Sig Dispense Refill     aspirin 81 MG tablet Take 81 mg by mouth daily       atorvastatin (LIPITOR) 20 MG tablet Take 1 tablet (20 mg) by mouth daily 90 tablet 3     Biotin 3 MG TABS Take 1,000 mcg by mouth daily        Calcium Carbonate-Vitamin D (CALCIUM + D PO) Take 1 chew tab by mouth 2 times daily 1300 mg daily  (650 each)       Cholecalciferol (VITAMIN D3 PO) Take 5,000 Units by mouth daily        COLLAGEN PO        metoprolol tartrate (LOPRESSOR) 25 MG tablet TAKE 1 TABLET BY MOUTH 2 TIMES DAILY 180 tablet 2     Omega-3 Fatty Acids (OMEGA-3 FISH OIL PO) Take 1 g by mouth 2 times daily (with meals) 1280  Mg       triamcinolone (KENALOG) 0.1 % external cream Apply topically every other day       fluorouracil 5 % SOLN Apply to facial spots on Saturday & Sunday         ALLERGIES     Allergies   Allergen Reactions     Penicillins      \"severe migraines\"       PAST MEDICAL HISTORY:  Past Medical History:   Diagnosis Date     Coronary artery disease     Mild to moderate CAD noted on 3/18/14 coronary angiogram      Heart palpitations      History of angina      Hyperlipidemia with target LDL less than 130 3/24/2015     Diagnosis updated by automated process. Provider to review and confirm.     Irregular heartbeat      Mitral valve disorders(424.0)      Unspecified essential hypertension 1995       PAST SURGICAL HISTORY:  Past Surgical History:   Procedure Laterality Date     C NONSPECIFIC PROCEDURE      S/P T&A     C " NONSPECIFIC PROCEDURE      LASER FIBROIDS     C NONSPECIFIC PROCEDURE       x2     COLONOSCOPY  2012    Procedure:COLONOSCOPY; COLONOSCOPY; Surgeon:GINGER PARKS; Location: GI     COLONOSCOPY N/A 2017    Procedure: COLONOSCOPY;  colonoscopy;  Surgeon: Dez Siegel MD;  Location:  GI     CORONARY ANGIOGRAPHY ADULT ORDER  3/18/14    3/18/14- Mild to moderate CAD, no interventions, treat medically.     ENT SURGERY  tonsils     GYN SURGERY  c sections       FAMILY HISTORY:  Family History   Problem Relation Age of Onset     C.A.D. Mother      Parkinsonism Mother 80     Alcohol/Drug Daughter 30     Circulatory Maternal Grandmother         MVP     Circulatory Maternal Aunt         MVP       SOCIAL HISTORY:  Social History     Socioeconomic History     Marital status:      Spouse name: None     Number of children: None     Years of education: None     Highest education level: None   Occupational History     None   Social Needs     Financial resource strain: None     Food insecurity:     Worry: None     Inability: None     Transportation needs:     Medical: None     Non-medical: None   Tobacco Use     Smoking status: Never Smoker     Smokeless tobacco: Never Used   Substance and Sexual Activity     Alcohol use: Yes     Alcohol/week: 0.0 standard drinks     Comment: 1 glass wine/day     Drug use: No     Sexual activity: Yes     Partners: Male   Lifestyle     Physical activity:     Days per week: None     Minutes per session: None     Stress: None   Relationships     Social connections:     Talks on phone: None     Gets together: None     Attends Orthodoxy service: None     Active member of club or organization: None     Attends meetings of clubs or organizations: None     Relationship status: None     Intimate partner violence:     Fear of current or ex partner: None     Emotionally abused: None     Physically abused: None     Forced sexual activity: None   Other Topics Concern  "    Parent/sibling w/ CABG, MI or angioplasty before 65F 55M? Not Asked      Service Not Asked     Blood Transfusions Not Asked     Caffeine Concern No     Comment: coffee: 2 cups a day     Occupational Exposure Not Asked     Hobby Hazards Not Asked     Sleep Concern No     Stress Concern No     Weight Concern Not Asked     Special Diet Yes     Comment: little red meat, a lot of veggies & fruit     Back Care Not Asked     Exercise Yes     Comment: walk and weight lifiting 5-6 x a week     Bike Helmet Not Asked     Seat Belt Not Asked     Self-Exams Not Asked   Social History Narrative          had MI 47        Remarried       Review of Systems:  Skin:  Negative       Eyes:  Negative glasses;contacts    ENT:  Negative      Respiratory:  Positive for dyspnea on exertion not dyspnea but has to take deep  breath(stress related)   Cardiovascular:  Negative Positive for;chest pain;palpitations some palpitations- definitely stress related.Cp described as discomfort is brief  Gastroenterology: Negative      Genitourinary:  Negative      Musculoskeletal:  Negative      Neurologic:  Positive for numbness or tingling of feet Big toe  Psychiatric:  Positive for excessive stress son had a stroke recently  Heme/Lymph/Imm:  Negative allergies    Endocrine:  Negative        Physical Exam:  Vitals: BP (!) 150/82   Pulse 62   Ht 1.651 m (5' 5\")   Wt 61.6 kg (135 lb 14.4 oz)   BMI 22.61 kg/m      Constitutional:  cooperative;in no acute distress        Skin:  warm and dry to the touch;warm and dry to the touch, no apparent skin lesions or masses noted          Head:  normocephalic;normocephalic, no masses or lesions        Eyes:           Lymph:      ENT:  no pallor or cyanosis;no pallor or cyanosis, dentition good        Neck:  JVP normal;carotid pulses are full and equal bilaterally;no carotid bruit        Respiratory:  clear to auscultation         Cardiac: regular rhythm;no murmurs, gallops or rubs " detected;normal S1 and S2                pulses full and equal;pulses full and equal, no bruits auscultated                                        GI:  abdomen soft;non-tender;no bruits        Extremities and Muscular Skeletal:  no edema;no deformities, clubbing, cyanosis, erythema observed              Neurological:  no gross motor deficits        Psych:  Alert and Oriented x 3        CC  Carline Palm MD  [address]  [place] [address]

## 2019-11-19 NOTE — PROGRESS NOTES
Service Date: 11/19/2019      REASON FOR CONSULTATION:  Followup for coronary artery disease and palpitations.      HISTORY OF PRESENT ILLNESS:  I had the pleasure of seeing Amaya Huff at the [facility] in [place] this morning.  She is a very pleasant 69-year-old female with history of mild coronary artery disease, mitral valve prolapse with mild mitral valve regurgitation and premature ventricular contractions.      I initially saw her 5 years ago for evaluation of chest pain.  She underwent stress test which was abnormal.  She later had a CTA which suggested 3-vessel coronary artery disease.  This prompted coronary angiogram which demonstrated mild to moderate coronary artery disease.  Her left ventricle filling pressures were elevated, thought to be secondary to diastolic dysfunction.  There may have been a component of coronary spasm as well.  She was commenced on excellent medical program including statin, low-dose aspirin, metoprolol and isosorbide mononitrate.  Her symptoms have since resolved.  We ultimately discontinued Imdur.  She is currently on low-dose metoprolol, statin and aspirin.      She also has history of palpitations due to PVCs.  Since our last visit 2 years ago, she has not had any significant symptoms.  She has had a lot of stresses lately.  Her mother passed away at 91.  As a result, she thinks her blood pressure might be a little on the higher side.  In fact, her blood pressure in the office today is 150/82.      IMPRESSION AND PLAN:   1.  Mild coronary artery disease, stable.   2.  History of PVCs and palpitations, stable.   3.  History of mitral valve prolapse with mild mitral valve regurgitation.      Ms. Huff is doing quite well from a cardiac point of view.  She does not endorse any significant symptoms at this point.  Her risk factors with the exception of her blood pressure are well controlled.      Whether her blood pressure is elevated  because of recent stressors or in fact she needs more aggressive blood pressure control is yet to be determined.  I have asked her to monitor her blood pressure at home.  She will check her blood pressure twice a day for the next couple of weeks and call us with the results.  If her blood pressure is elevated, then we would either increase the metoprolol or add low-dose Norvasc to her regimen.      It was a pleasure seeing Ms. Huff in clinic this morning.  I appreciate the opportunity to be part of her care.         GUILLERMINA CARIAS MD             D: 2019   T: 2019   MT: FLORINA      Name:     STEVEN HUFF   MRN:      [id number]        Account:      [id number]   :      1950           Service Date: 2019      Document: [id number]

## 2019-11-19 NOTE — LETTER
"11/19/2019    Carline Palm MD  [address]  [place] [address]    RE: Amaya Huff       Dear Colleague,    I had the pleasure of seeing Amaya Huff in the [facility].    HPI and Plan:   See dictation    Orders Placed This Encounter   Procedures     Follow-Up with Cardiologist       No orders of the defined types were placed in this encounter.      There are no discontinued medications.      Encounter Diagnoses   Name Primary?     Hyperlipidemia LDL goal <130      Coronary artery disease involving native coronary artery of native heart without angina pectoris Yes     Essential hypertension        CURRENT MEDICATIONS:  Current Outpatient Medications   Medication Sig Dispense Refill     aspirin 81 MG tablet Take 81 mg by mouth daily       atorvastatin (LIPITOR) 20 MG tablet Take 1 tablet (20 mg) by mouth daily 90 tablet 3     Biotin 3 MG TABS Take 1,000 mcg by mouth daily        Calcium Carbonate-Vitamin D (CALCIUM + D PO) Take 1 chew tab by mouth 2 times daily 1300 mg daily  (650 each)       Cholecalciferol (VITAMIN D3 PO) Take 5,000 Units by mouth daily        COLLAGEN PO        metoprolol tartrate (LOPRESSOR) 25 MG tablet TAKE 1 TABLET BY MOUTH 2 TIMES DAILY 180 tablet 2     Omega-3 Fatty Acids (OMEGA-3 FISH OIL PO) Take 1 g by mouth 2 times daily (with meals) 1280  Mg       triamcinolone (KENALOG) 0.1 % external cream Apply topically every other day       fluorouracil 5 % SOLN Apply to facial spots on Saturday & Sunday         ALLERGIES     Allergies   Allergen Reactions     Penicillins      \"severe migraines\"       PAST MEDICAL HISTORY:  Past Medical History:   Diagnosis Date     Coronary artery disease     Mild to moderate CAD noted on 3/18/14 coronary angiogram      Heart palpitations      History of angina      Hyperlipidemia with target LDL less than 130 3/24/2015     Diagnosis updated by automated process. Provider to review and confirm.     Irregular " heartbeat      Mitral valve disorders(424.0)      Unspecified essential hypertension        PAST SURGICAL HISTORY:  Past Surgical History:   Procedure Laterality Date     C NONSPECIFIC PROCEDURE      S/P T&A     C NONSPECIFIC PROCEDURE      LASER FIBROIDS     C NONSPECIFIC PROCEDURE       x2     COLONOSCOPY  2012    Procedure:COLONOSCOPY; COLONOSCOPY; Surgeon:GINGER PARKS; Location: GI     COLONOSCOPY N/A 2017    Procedure: COLONOSCOPY;  colonoscopy;  Surgeon: Dez Siegel MD;  Location:  GI     CORONARY ANGIOGRAPHY ADULT ORDER  3/18/14    3/18/14- Mild to moderate CAD, no interventions, treat medically.     ENT SURGERY  tonsils     GYN SURGERY  c sections       FAMILY HISTORY:  Family History   Problem Relation Age of Onset     C.A.D. Mother      Parkinsonism Mother 80     Alcohol/Drug Daughter 30     Circulatory Maternal Grandmother         MVP     Circulatory Maternal Aunt         MVP       SOCIAL HISTORY:  Social History     Socioeconomic History     Marital status:      Spouse name: None     Number of children: None     Years of education: None     Highest education level: None   Occupational History     None   Social Needs     Financial resource strain: None     Food insecurity:     Worry: None     Inability: None     Transportation needs:     Medical: None     Non-medical: None   Tobacco Use     Smoking status: Never Smoker     Smokeless tobacco: Never Used   Substance and Sexual Activity     Alcohol use: Yes     Alcohol/week: 0.0 standard drinks     Comment: 1 glass wine/day     Drug use: No     Sexual activity: Yes     Partners: Male   Lifestyle     Physical activity:     Days per week: None     Minutes per session: None     Stress: None   Relationships     Social connections:     Talks on phone: None     Gets together: None     Attends Baptism service: None     Active member of club or organization: None     Attends meetings of clubs or organizations:  "None     Relationship status: None     Intimate partner violence:     Fear of current or ex partner: None     Emotionally abused: None     Physically abused: None     Forced sexual activity: None   Other Topics Concern     Parent/sibling w/ CABG, MI or angioplasty before 65F 55M? Not Asked      Service Not Asked     Blood Transfusions Not Asked     Caffeine Concern No     Comment: coffee: 2 cups a day     Occupational Exposure Not Asked     Hobby Hazards Not Asked     Sleep Concern No     Stress Concern No     Weight Concern Not Asked     Special Diet Yes     Comment: little red meat, a lot of veggies & fruit     Back Care Not Asked     Exercise Yes     Comment: walk and weight lifiting 5-6 x a week     Bike Helmet Not Asked     Seat Belt Not Asked     Self-Exams Not Asked   Social History Narrative          had MI 47        Remarried       Review of Systems:  Skin:  Negative       Eyes:  Negative glasses;contacts    ENT:  Negative      Respiratory:  Positive for dyspnea on exertion not dyspnea but has to take deep  breath(stress related)   Cardiovascular:  Negative Positive for;chest pain;palpitations some palpitations- definitely stress related.Cp described as discomfort is brief  Gastroenterology: Negative      Genitourinary:  Negative      Musculoskeletal:  Negative      Neurologic:  Positive for numbness or tingling of feet Big toe  Psychiatric:  Positive for excessive stress son had a stroke recently  Heme/Lymph/Imm:  Negative allergies    Endocrine:  Negative        Physical Exam:  Vitals: BP (!) 150/82   Pulse 62   Ht 1.651 m (5' 5\")   Wt 61.6 kg (135 lb 14.4 oz)   BMI 22.61 kg/m       Constitutional:  cooperative;in no acute distress        Skin:  warm and dry to the touch;warm and dry to the touch, no apparent skin lesions or masses noted          Head:  normocephalic;normocephalic, no masses or lesions        Eyes:           Lymph:      ENT:  no pallor or cyanosis;no pallor or " cyanosis, dentition good        Neck:  JVP normal;carotid pulses are full and equal bilaterally;no carotid bruit        Respiratory:  clear to auscultation         Cardiac: regular rhythm;no murmurs, gallops or rubs detected;normal S1 and S2                pulses full and equal;pulses full and equal, no bruits auscultated                                        GI:  abdomen soft;non-tender;no bruits        Extremities and Muscular Skeletal:  no edema;no deformities, clubbing, cyanosis, erythema observed              Neurological:  no gross motor deficits        Psych:  Alert and Oriented x 3        CC  Carline Palm MD  [address]  [place] [address]                Thank you for allowing me to participate in the care of your patient.      Sincerely,     Josh Simental MD, MD     [facility]    cc:   Carline Palm MD  [address]  [place] [id number]

## 2019-11-19 NOTE — LETTER
11/19/2019      Carline Palm MD  [address]  [facility] [address]      RE: Amaya Huff       Dear Colleague,    I had the pleasure of seeing Amaya Huff in the [facility].    Service Date: 11/19/2019      REASON FOR CONSULTATION:  Followup for coronary artery disease and palpitations.      HISTORY OF PRESENT ILLNESS:  I had the pleasure of seeing Amaya Huff at the [facility] in [place] this morning.  She is a very pleasant 69-year-old female with history of mild coronary artery disease, mitral valve prolapse with mild mitral valve regurgitation and premature ventricular contractions.      I initially saw her 5 years ago for evaluation of chest pain.  She underwent stress test which was abnormal.  She later had a CTA which suggested 3-vessel coronary artery disease.  This prompted coronary angiogram which demonstrated mild to moderate coronary artery disease.  Her left ventricle filling pressures were elevated, thought to be secondary to diastolic dysfunction.  There may have been a component of coronary spasm as well.  She was commenced on excellent medical program including statin, low-dose aspirin, metoprolol and isosorbide mononitrate.  Her symptoms have since resolved.  We ultimately discontinued Imdur.  She is currently on low-dose metoprolol, statin and aspirin.      She also has history of palpitations due to PVCs.  Since our last visit 2 years ago, she has not had any significant symptoms.  She has had a lot of stresses lately.  Her mother passed away at 91.  As a result, she thinks her blood pressure might be a little on the higher side.  In fact, her blood pressure in the office today is 150/82.      IMPRESSION AND PLAN:   1.  Mild coronary artery disease, stable.   2.  History of PVCs and palpitations, stable.   3.  History of mitral valve prolapse with mild mitral valve regurgitation.      Ms. Huff is doing  quite well from a cardiac point of view.  She does not endorse any significant symptoms at this point.  Her risk factors with the exception of her blood pressure are well controlled.      Whether her blood pressure is elevated because of recent stressors or in fact she needs more aggressive blood pressure control is yet to be determined.  I have asked her to monitor her blood pressure at home.  She will check her blood pressure twice a day for the next couple of weeks and call us with the results.  If her blood pressure is elevated, then we would either increase the metoprolol or add low-dose Norvasc to her regimen.      It was a pleasure seeing Ms. Huff in clinic this morning.  I appreciate the opportunity to be part of her care.         GUILLERMINA CARIAS MD             D: 2019   T: 2019   MT: FLORINA      Name:     STEVEN HUFF   MRN:      [id number]        Account:      [id number]   :      1950           Service Date: 2019      Document: [id number]         Outpatient Encounter Medications as of 2019   Medication Sig Dispense Refill     aspirin 81 MG tablet Take 81 mg by mouth daily       atorvastatin (LIPITOR) 20 MG tablet Take 1 tablet (20 mg) by mouth daily 90 tablet 3     Biotin 3 MG TABS Take 1,000 mcg by mouth daily        Calcium Carbonate-Vitamin D (CALCIUM + D PO) Take 1 chew tab by mouth 2 times daily 1300 mg daily  (650 each)       Cholecalciferol (VITAMIN D3 PO) Take 5,000 Units by mouth daily        COLLAGEN PO        metoprolol tartrate (LOPRESSOR) 25 MG tablet TAKE 1 TABLET BY MOUTH 2 TIMES DAILY 180 tablet 2     Omega-3 Fatty Acids (OMEGA-3 FISH OIL PO) Take 1 g by mouth 2 times daily (with meals) 1280  Mg       triamcinolone (KENALOG) 0.1 % external cream Apply topically every other day       fluorouracil 5 % SOLN Apply to facial spots on Saturday &        No facility-administered encounter medications on file as of 2019.      Again, thank you for  allowing me to participate in the care of your patient.      Sincerely,    Josh Simental MD, MD     [facility]

## 2019-12-13 ENCOUNTER — CARE COORDINATION (OUTPATIENT)
Dept: CARDIOLOGY | Facility: CLINIC | Age: 69
End: 2019-12-13

## 2019-12-13 NOTE — PROGRESS NOTES
Patient sent in 2 weeks of BP readings.  The last 4 days BP's were 135/75 range.  She states she is feeling better.  Having less stress.  Able to  start working out better.  Wants to send in another 2 weeks before making any medications changes at this time.

## 2020-02-09 DIAGNOSIS — I25.10 MILD CAD: ICD-10-CM

## 2020-02-10 RX ORDER — METOPROLOL TARTRATE 25 MG/1
TABLET, FILM COATED ORAL
Qty: 120 TABLET | Refills: 0 | Status: SHIPPED | OUTPATIENT
Start: 2020-02-10 | End: 2020-05-26

## 2020-02-10 NOTE — TELEPHONE ENCOUNTER
"metoprolol tartrate (LOPRESSOR) 25 MG tablet     Last Written Prescription Date:  5/15/2019  Last Fill Quantity: 180,  # refills: 2   Last office visit: 3/21/2019 with prescribing provider:  Dr. Palm    Future Office Visit:   Next 5 appointments (look out 90 days)    Apr 03, 2020  9:00 AM CDT  PHYSICAL with Carline Palm MD  [facility] ([facility]) [address] [address] [phone]         Requested Prescriptions   Pending Prescriptions Disp Refills     metoprolol tartrate (LOPRESSOR) 25 MG tablet [Pharmacy Med Name: METOPROLOL TARTRATE 25 MG TAB] 180 tablet 2     Sig: TAKE 1 TABLET BY MOUTH 2 TIMES DAILY       Beta-Blockers Protocol Failed - 2/9/2020  9:31 AM        Failed - Blood pressure under 140/90 in past 12 months     BP Readings from Last 3 Encounters:   11/19/19 (!) 150/82   04/04/19 120/74   03/21/19 132/67                 Passed - Patient is age 6 or older        Passed - Recent (12 mo) or future (30 days) visit within the authorizing provider's specialty     Patient has had an office visit with the authorizing provider or a provider within the authorizing providers department within the previous 12 mos or has a future within next 30 days. See \"Patient Info\" tab in inbasket, or \"Choose Columns\" in Meds & Orders section of the refill encounter.              Passed - Medication is active on med list          "

## 2020-02-10 NOTE — TELEPHONE ENCOUNTER
A 60 day supply is given, patient is due for an office visit.  Patient has appointment scheduled with provider on 4/3/2020.  SANDY Bartholomew, RN  [facility]

## 2020-02-25 ENCOUNTER — TRANSFERRED RECORDS (OUTPATIENT)
Dept: HEALTH INFORMATION MANAGEMENT | Facility: CLINIC | Age: 70
End: 2020-02-25

## 2020-02-27 ENCOUNTER — TRANSFERRED RECORDS (OUTPATIENT)
Dept: HEALTH INFORMATION MANAGEMENT | Facility: CLINIC | Age: 70
End: 2020-02-27

## 2020-04-15 DIAGNOSIS — E78.5 HYPERLIPIDEMIA LDL GOAL <130: ICD-10-CM

## 2020-04-15 NOTE — TELEPHONE ENCOUNTER
"atorvastatin (LIPITOR) 20 MG tablet  90 tablet  3  3/21/2019     Last Written Prescription Date:  3/21/19  Last Fill Quantity: 90,  # refills: 3   Last office visit: 3/21/2019 with prescribing provider:  Arpita   Future Office Visit:   Next 5 appointments (look out 90 days)    Jul 06, 2020 10:00 AM CDT  PHYSICAL with Carline Palm MD  [facility] ([facility] [address] [facility] [id number] [phone]         Requested Prescriptions   Pending Prescriptions Disp Refills     atorvastatin (LIPITOR) 20 MG tablet [Pharmacy Med Name: ATORVASTATIN 20 MG TABLET] 90 tablet 3     Sig: TAKE 1 TABLET BY MOUTH EVERY DAY       Statins Protocol Failed - 4/15/2020 11:39 AM        Failed - LDL on file in past 12 months     Recent Labs   Lab Test 03/21/19  1101   LDL 74             Failed - Recent (12 mo) or future (30 days) visit within the authorizing provider's specialty     Patient has had an office visit with the authorizing provider or a provider within the authorizing providers department within the previous 12 mos or has a future within next 30 days. See \"Patient Info\" tab in inbasket, or \"Choose Columns\" in Meds & Orders section of the refill encounter.              Passed - No abnormal creatine kinase in past 12 months     No lab results found.             Passed - Medication is active on med list        Passed - Patient is age 18 or older        Passed - No active pregnancy on record        Passed - No positive pregnancy test in past 12 months           No flowsheet data found.    "

## 2020-04-16 RX ORDER — ATORVASTATIN CALCIUM 20 MG/1
TABLET, FILM COATED ORAL
Qty: 90 TABLET | Refills: 0 | Status: SHIPPED | OUTPATIENT
Start: 2020-04-16 | End: 2020-07-06

## 2020-04-16 NOTE — TELEPHONE ENCOUNTER
Routing refill request to provider for review/approval because:  Labs not current:    Patient needs to be seen because it has been more than 1 year since last office visit.    Pt is scheduled in July.  Please authorize if appropriate.  Thanks,  Aixa Stone RN

## 2020-05-23 DIAGNOSIS — I25.10 MILD CAD: ICD-10-CM

## 2020-05-26 RX ORDER — METOPROLOL TARTRATE 25 MG/1
25 TABLET, FILM COATED ORAL 2 TIMES DAILY
Qty: 120 TABLET | Refills: 0 | Status: SHIPPED | OUTPATIENT
Start: 2020-05-26 | End: 2020-07-22

## 2020-05-26 NOTE — TELEPHONE ENCOUNTER
Only able to approve 30 day per protocol, pt requesting 90 day instead    Next 5 appointments (look out 90 days)    Jul 06, 2020 10:00 AM CDT  PHYSICAL with Carline Palm MD  [facility] [address] [facility] [address]  [phone]        Nai BLOCK RN

## 2020-05-26 NOTE — TELEPHONE ENCOUNTER
Pt called to check in on it. Has 4 days left and is leaving town. Physical scheduled for 07.06.2020    Pt ph: [phone]

## 2020-07-06 ENCOUNTER — VIRTUAL VISIT (OUTPATIENT)
Dept: FAMILY MEDICINE | Facility: CLINIC | Age: 70
End: 2020-07-06
Payer: COMMERCIAL

## 2020-07-06 DIAGNOSIS — I49.49 PREMATURE BEATS: ICD-10-CM

## 2020-07-06 DIAGNOSIS — M94.9 DISORDER OF BONE AND CARTILAGE: ICD-10-CM

## 2020-07-06 DIAGNOSIS — Z00.00 ENCOUNTER FOR MEDICARE ANNUAL WELLNESS EXAM: Primary | ICD-10-CM

## 2020-07-06 DIAGNOSIS — M85.88 OTHER SPECIFIED DISORDERS OF BONE DENSITY AND STRUCTURE, OTHER SITE: ICD-10-CM

## 2020-07-06 DIAGNOSIS — E78.5 HYPERLIPIDEMIA LDL GOAL <70: ICD-10-CM

## 2020-07-06 DIAGNOSIS — M89.9 DISORDER OF BONE AND CARTILAGE: ICD-10-CM

## 2020-07-06 DIAGNOSIS — I25.10 MILD CAD: ICD-10-CM

## 2020-07-06 PROCEDURE — G0438 PPPS, INITIAL VISIT: HCPCS | Performed by: INTERNAL MEDICINE

## 2020-07-06 RX ORDER — TRETINOIN 0.25 MG/G
CREAM TOPICAL
COMMUNITY
Start: 2020-06-10

## 2020-07-06 RX ORDER — ATORVASTATIN CALCIUM 20 MG/1
20 TABLET, FILM COATED ORAL DAILY
Qty: 90 TABLET | Refills: 4 | Status: SHIPPED | OUTPATIENT
Start: 2020-07-06 | End: 2021-07-09

## 2020-07-06 NOTE — PATIENT INSTRUCTIONS
Patient Education   Personalized Prevention Plan  You are due for the preventive services outlined below.  Your care team is available to assist you in scheduling these services.  If you have already completed any of these items, please share that information with your care team to update in your medical record.  Health Maintenance Due   Topic Date Due     Zoster (Shingles) Vaccine (2 of 3) 02/16/2015     Pneumococcal Vaccine (2 of 2 - PPSV23) 12/22/2019     PHQ-2  01/01/2020     Discuss Advance Care Planning  01/07/2020     Annual Wellness Visit  03/21/2020     Cholesterol Lab  03/21/2020     FALL RISK ASSESSMENT  03/21/2020

## 2020-07-06 NOTE — PROGRESS NOTES
"Amaya Huff is a 70 year old female who is being evaluated via a billable video visit.      The patient has been notified of following:     \"This video visit will be conducted via a call between you and your physician/provider. We have found that certain health care needs can be provided without the need for an in-person physical exam.  This service lets us provide the care you need with a video conversation.  If a prescription is necessary we can send it directly to your pharmacy.  If lab work is needed we can place an order for that and you can then stop by our lab to have the test done at a later time.    Video visits are billed at different rates depending on your insurance coverage.  Please reach out to your insurance provider with any questions.    If during the course of the call the physician/provider feels a video visit is not appropriate, you will not be charged for this service.\"    Patient has given verbal consent for Video visit? Yes  How would you like to obtain your AVS? Rene  Patient would like the video invitation sent by: Text to cell phone: [phone]  Will anyone else be joining your video visit? No    Subjective     Amaya Huff is a 70 year old female who presents today via video visit for the following health issues:    HPI  Annual Wellness Visit    Are you in the first 12 months of your Medicare Part B coverage?  No    Physical Health:    In general, how would you rate your overall physical health? excellent    Outside of work, how many days during the week do you exercise?6-7 days/week    Outside of work, approximately how many minutes a day do you exercise?45-60 minutes    If you drink alcohol do you typically have >3 drinks per day or >7 drinks per week? No    Do you usually eat at least 4 servings of fruit and vegetables a day, include whole grains & fiber and avoid regularly eating high fat or \"junk\" foods? Yes    Do you have any problems taking medications regularly? " No    Do you have any side effects from medications? none    Needs assistance for the following daily activities: no assistance needed    Which of the following safety concerns are present in your home?  none identified     Hearing impairment: No    In the past 6 months, have you been bothered by leaking of urine? no    Mental Health:    In general, how would you rate your overall mental or emotional health? excellent  PHQ-2 Score:      Do you feel safe in your environment? Yes    Have you ever done Advance Care Planning? (For example, a Health Directive, POLST, or a discussion with a medical provider or your loved ones about your wishes)? Yes, advance care planning is on file.    Fall risk:  Fallen 2 or more times in the past year?: No  Any fall with injury in the past year?: No    Cognitive Screening: Unable to complete due to virtual visit; need for additional assessment in future face-to-face visit    Do you have sleep apnea, excessive snoring or daytime drowsiness?: no    Current providers sharing in care for this patient include:   Patient Care Team:  Carline Palm MD as PCP - General (Internal Medicine)  Carline Palm MD as Assigned PCP        Memory test was done with 3 object recall which was excellent at 1 minutes and 5 minutes  Get up and go test was completed also which was done for fall risk by me and it was normal less than 10 seconds       Video Start Time: 9.45        Patient Active Problem List   Diagnosis     Abdominal pain     Premature beats     Mitral valve disorder     Advanced directives, counseling/discussion     Mild CAD     SBO (small bowel obstruction) (H)     Past Surgical History:   Procedure Laterality Date     C NONSPECIFIC PROCEDURE      S/P T&A     C NONSPECIFIC PROCEDURE      LASER FIBROIDS     C NONSPECIFIC PROCEDURE       x2     COLONOSCOPY  2012    Procedure:COLONOSCOPY; COLONOSCOPY; Surgeon:GINGER PARKS; Location: GI     COLONOSCOPY N/A 2017     Procedure: COLONOSCOPY;  colonoscopy;  Surgeon: Dez Siegel MD;  Location:  GI     CORONARY ANGIOGRAPHY ADULT ORDER  3/18/14    3/18/14- Mild to moderate CAD, no interventions, treat medically.     ENT SURGERY  tonsils     GYN SURGERY  c sections       Social History     Tobacco Use     Smoking status: Never Smoker     Smokeless tobacco: Never Used   Substance Use Topics     Alcohol use: Yes     Alcohol/week: 0.0 standard drinks     Comment: 1 glass wine/day     Family History   Problem Relation Age of Onset     C.A.D. Mother      Parkinsonism Mother 80     Alcohol/Drug Daughter 30     Cerebrovascular Disease Son 40        stroke, PFO     Circulatory Maternal Grandmother         MVP     Circulatory Maternal Aunt         MVP         Current Outpatient Medications   Medication Sig Dispense Refill     aspirin 81 MG tablet Take 81 mg by mouth daily       atorvastatin (LIPITOR) 20 MG tablet Take 1 tablet (20 mg) by mouth daily 90 tablet 4     Biotin 3 MG TABS Take 1,000 mcg by mouth daily        Calcium Carbonate-Vitamin D (CALCIUM + D PO) Take 1 chew tab by mouth 2 times daily 1300 mg daily  (650 each)       Cholecalciferol (VITAMIN D3 PO) Take 5,000 Units by mouth daily        COLLAGEN PO        fluorouracil 5 % SOLN Apply to facial spots on Saturday & Sunday       metoprolol tartrate (LOPRESSOR) 25 MG tablet Take 1 tablet (25 mg) by mouth 2 times daily 120 tablet 0     Omega-3 Fatty Acids (OMEGA-3 FISH OIL PO) Take 1 g by mouth 2 times daily (with meals) 1280  Mg       tretinoin (RETIN-A) 0.025 % external cream APPLY TO FACE EVERY DAY AT BEDTIME       triamcinolone (KENALOG) 0.1 % external cream Apply topically every other day         Reviewed and updated as needed this visit by Provider  Tobacco  Allergies  Meds  Problems  Med Hx  Surg Hx  Fam Hx         Review of Systems   10 point ROS of systems including Constitutional, Eyes, Respiratory, Cardiovascular, Gastroenterology, Genitourinary,  Integumentary, Muscularskeletal, Psychiatric were all negative except for pertinent positives noted in my HPI.        Objective    117/80  Pulse 65  Weight 134lb  Height 5.4             Physical Exam     GENERAL: Healthy, alert and no distress  EYES: Eyes grossly normal to inspection.  No discharge or erythema, or obvious scleral/conjunctival abnormalities.  RESP: No audible wheeze, cough, or visible cyanosis.  No visible retractions or increased work of breathing.    SKIN: Visible skin clear. No significant rash, abnormal pigmentation or lesions.  NEURO: Cranial nerves grossly intact.  Mentation and speech appropriate for age.  PSYCH: Mentation appears normal, affect normal/bright, judgement and insight intact, normal speech and appearance well-groomed.              Assessment & Plan     Amaya was seen today for physical.    Diagnoses and all orders for this visit:    Encounter for Medicare annual wellness exam  Mammogram was done in February which was normal Pap smear last year and colonoscopy in 2017  She is up to date on immunizations but we will check if she can get Shingrix  She will also do a bone density scan because of osteopenia  Mild CAD  -     CBC with platelets; Future  -     Vitamin B12; Future  She has mild to moderate CAD and exercises every day and has no symptoms  She is on beta-blocker, aspirin, statins  Premature beats  -     CBC with platelets; Future  -     TSH with free T4 reflex; Future  -     Vitamin B12; Future    Hyperlipidemia LDL goal <70  -     Lipid panel reflex to direct LDL Fasting; Future  -     Comprehensive metabolic panel; Future  Patient is on statins and we will continue Lipitor-     TSH with free T4 reflex; Future      -     Vitamin B12; Future  -     atorvastatin (LIPITOR) 20 MG tablet; Take 1 tablet (20 mg) by mouth daily    ICD-10-CM    1. Encounter for Medicare annual wellness exam  Z00.00    2. Mild CAD  I25.10 CBC with platelets     Vitamin B12   3. Premature beats   I49.49 CBC with platelets     TSH with free T4 reflex     Vitamin B12   4. Hyperlipidemia LDL goal <70  E78.5 Lipid panel reflex to direct LDL Fasting     Comprehensive metabolic panel     TSH with free T4 reflex   5. Disorder of bone and cartilage  M89.9 DX Hip/Pelvis/Spine    M94.9    6. Other specified disorders of bone density and structure, other site   M85.88 DX Hip/Pelvis/Spine     Fall test was normal and preventive screening was discussed with her I also discussed about  The diet and nutrition  Memory was also normal  Family history was updated as well with son's history of stroke       Bone density scan was also ordered and she will look into Shingrix      Return in about 1 day (around 7/7/2020) for Annual Wellness Visit, Lab Work, Fasting Labs.    Carline Palm MD  [facility]      Video-Visit Details    Type of service:  Video Visit    Video End Time:10.15    Originating Location (pt. Location): Home    Distant Location (provider location):  [facility]     Platform used for Video Visit: AmWell    Return in about 1 day (around 7/7/2020) for Annual Wellness Visit, Lab Work, Fasting Labs.       Carline Palm MD

## 2020-07-07 DIAGNOSIS — I49.49 PREMATURE BEATS: ICD-10-CM

## 2020-07-07 DIAGNOSIS — E78.5 HYPERLIPIDEMIA LDL GOAL <70: ICD-10-CM

## 2020-07-07 DIAGNOSIS — I25.10 MILD CAD: ICD-10-CM

## 2020-07-07 LAB
ALBUMIN SERPL-MCNC: 3.9 G/DL (ref 3.4–5)
ALP SERPL-CCNC: 68 U/L (ref 40–150)
ALT SERPL W P-5'-P-CCNC: 34 U/L (ref 0–50)
ANION GAP SERPL CALCULATED.3IONS-SCNC: 4 MMOL/L (ref 3–14)
AST SERPL W P-5'-P-CCNC: 26 U/L (ref 0–45)
BILIRUB SERPL-MCNC: 0.6 MG/DL (ref 0.2–1.3)
BUN SERPL-MCNC: 16 MG/DL (ref 7–30)
CALCIUM SERPL-MCNC: 9.8 MG/DL (ref 8.5–10.1)
CHLORIDE SERPL-SCNC: 107 MMOL/L (ref 94–109)
CHOLEST SERPL-MCNC: 182 MG/DL
CO2 SERPL-SCNC: 29 MMOL/L (ref 20–32)
CREAT SERPL-MCNC: 0.81 MG/DL (ref 0.52–1.04)
ERYTHROCYTE [DISTWIDTH] IN BLOOD BY AUTOMATED COUNT: 12.8 % (ref 10–15)
GFR SERPL CREATININE-BSD FRML MDRD: 74 ML/MIN/{1.73_M2}
GLUCOSE SERPL-MCNC: 94 MG/DL (ref 70–99)
HCT VFR BLD AUTO: 38.5 % (ref 35–47)
HDLC SERPL-MCNC: 80 MG/DL
HGB BLD-MCNC: 12.4 G/DL (ref 11.7–15.7)
LDLC SERPL CALC-MCNC: 87 MG/DL
MCH RBC QN AUTO: 28.6 PG (ref 26.5–33)
MCHC RBC AUTO-ENTMCNC: 32.2 G/DL (ref 31.5–36.5)
MCV RBC AUTO: 89 FL (ref 78–100)
NONHDLC SERPL-MCNC: 102 MG/DL
PLATELET # BLD AUTO: 248 10E9/L (ref 150–450)
POTASSIUM SERPL-SCNC: 4.1 MMOL/L (ref 3.4–5.3)
PROT SERPL-MCNC: 7.3 G/DL (ref 6.8–8.8)
RBC # BLD AUTO: 4.34 10E12/L (ref 3.8–5.2)
SODIUM SERPL-SCNC: 140 MMOL/L (ref 133–144)
TRIGL SERPL-MCNC: 76 MG/DL
TSH SERPL DL<=0.005 MIU/L-ACNC: 1.25 MU/L (ref 0.4–4)
VIT B12 SERPL-MCNC: 841 PG/ML (ref 193–986)
WBC # BLD AUTO: 4.4 10E9/L (ref 4–11)

## 2020-07-07 PROCEDURE — 36415 COLL VENOUS BLD VENIPUNCTURE: CPT | Performed by: INTERNAL MEDICINE

## 2020-07-07 PROCEDURE — 80061 LIPID PANEL: CPT | Performed by: INTERNAL MEDICINE

## 2020-07-07 PROCEDURE — 80053 COMPREHEN METABOLIC PANEL: CPT | Performed by: INTERNAL MEDICINE

## 2020-07-07 PROCEDURE — 84443 ASSAY THYROID STIM HORMONE: CPT | Performed by: INTERNAL MEDICINE

## 2020-07-07 PROCEDURE — 85027 COMPLETE CBC AUTOMATED: CPT | Performed by: INTERNAL MEDICINE

## 2020-07-07 PROCEDURE — 82607 VITAMIN B-12: CPT | Performed by: INTERNAL MEDICINE

## 2020-07-21 DIAGNOSIS — I25.10 MILD CAD: ICD-10-CM

## 2020-07-22 RX ORDER — METOPROLOL TARTRATE 25 MG/1
TABLET, FILM COATED ORAL
Qty: 120 TABLET | Refills: 0 | Status: SHIPPED | OUTPATIENT
Start: 2020-07-22 | End: 2020-09-22

## 2020-09-02 ENCOUNTER — NURSE TRIAGE (OUTPATIENT)
Dept: NURSING | Facility: CLINIC | Age: 70
End: 2020-09-02

## 2020-09-02 ENCOUNTER — VIRTUAL VISIT (OUTPATIENT)
Dept: URGENT CARE | Facility: CLINIC | Age: 70
End: 2020-09-02
Payer: COMMERCIAL

## 2020-09-02 DIAGNOSIS — N30.01 ACUTE CYSTITIS WITH HEMATURIA: Primary | ICD-10-CM

## 2020-09-02 PROCEDURE — 99213 OFFICE O/P EST LOW 20 MIN: CPT | Mod: TEL | Performed by: FAMILY MEDICINE

## 2020-09-02 RX ORDER — NITROFURANTOIN 25; 75 MG/1; MG/1
100 CAPSULE ORAL 2 TIMES DAILY
Qty: 14 CAPSULE | Refills: 0 | Status: SHIPPED | OUTPATIENT
Start: 2020-09-02 | End: 2020-09-09

## 2020-09-03 NOTE — PROGRESS NOTES
"The patient has been notified of following:     \"This telephone or video visit will be conducted via a call between you and your physician/provider. We have found that certain health care needs can be provided without the need for a physical exam.  This service lets us provide the care you need with a short phone conversation.  If a prescription is necessary we can send it directly to your pharmacy.  If lab work is needed we can place an order for that and you can then stop by our lab to have the test done at a later time.    Telephone or video visits are billed at different rates depending on your insurance coverage. During this emergency period, for some insurers they may be billed the same as an in-person visit.  Please reach out to your insurance provider with any questions.    Patient has given verbal consent for Telephone or video visit?  Yes  Subjective   HPI    Has had known history of UTI recurrent   Dysuria/ugency/frequency 1 days  Vaginal discharge or concerns: No  Fever chills: None  Nausea vomiting: None  Flank Pain: None    Slightly pink urine which is how she usually gets with a uti since she was in her 20's     Problem list and histories reviewed & adjusted, as indicated.  Additional history: as documented    Problem list, Medication list, Allergies, and Medical/Social/Surgical histories reviewed in [facility] and updated as appropriate.    ROS:  Constitutional, HEENT, cardiovascular, pulmonary, gi and gu systems are negative, except as otherwise noted.    OBJECTIVE:                                                    There were no vitals taken for this visit.  There is no height or weight on file to calculate BMI.  GENERAL: healthy, alert and no distress   PSYCH: Alert and oriented times 3; coherent speech, normal   rate and volume, able to articulate logical thoughts, able   to abstract reason, no tangential thoughts, no hallucinations   or delusions  Her affect is normal  RESP: No cough, no audible " wheezing, able to talk in full sentences  Additional exam:  none  Remainder of exam unable to be completed due to telephone visits    Diagnostic Test Results:  No results found for this or any previous visit (from the past 24 hour(s)).     ASSESSMENT/PLAN:                                                        ICD-10-CM    1. Acute cystitis with hematuria  N30.01 nitroFURantoin macrocrystal-monohydrate (MACROBID) 100 MG capsule     Discussed risks vs benefit of empiric telehealth treatment without a urinalysis and patient voiced understanding  She states symptoms are very familiar and common for her     Patient with hematuria advised a repeat urinalysis in 6 weeks is needed to make sure hematuria is resolved. If persistent aware will need further evaluation to rule out possibility of stones or tumors. Patient states she will discuss with her primary care provider     Aware to go to ER or come in immediately if with any fever chills nausea vomiting or flank pain.  Adverse reactions of medications discussed.  Over the counter medications discussed.   Aware to come back in if with worsening symptoms or if no relief despite treatment plan  Patient voiced understanding and had no further questions.     Telephone visit: 6 minutes    Violeta Haskins MD      Virtual Urgent Care  [facility] VIRTUAL URGENT CARE

## 2020-09-22 ENCOUNTER — VIRTUAL VISIT (OUTPATIENT)
Dept: FAMILY MEDICINE | Facility: OTHER | Age: 70
End: 2020-09-22

## 2020-09-22 ENCOUNTER — TELEPHONE (OUTPATIENT)
Dept: FAMILY MEDICINE | Facility: CLINIC | Age: 70
End: 2020-09-22

## 2020-09-22 DIAGNOSIS — I25.10 MILD CAD: ICD-10-CM

## 2020-09-22 DIAGNOSIS — Z20.822 SUSPECTED 2019 NOVEL CORONAVIRUS INFECTION: Primary | ICD-10-CM

## 2020-09-22 NOTE — TELEPHONE ENCOUNTER
Reason for call:  Patient reporting a symptom    Symptom or request: fever last night 100.2 on meds for bladder infection since Saturday concenred seein dad who is 91 years old. Terrible headache. Please advise    Duration (how long have symptoms been present):     Have you been treated for this before? No    Additional comments:     Phone Number patient can be reached at:  Home number on file [phone] (home)    Best Time:  any    Can we leave a detailed message on this number:  YES    Call taken on 9/22/2020 at 8:18 AM by Earlene Adames

## 2020-09-22 NOTE — PROGRESS NOTES
"Date: 2020 13:58:22  Clinician: Bertrand Rosas  Clinician NPI: [id number]  Patient: Amaya Huff  Patient : 1950  Patient Address: [address] [place] [address]  Patient Phone: [phone]  Visit Protocol: URI  Patient Summary:  Amaya is a 70 year old ( : 1950 ) female who initiated a [facility] Visit for COVID-19 (Coronavirus) evaluation and screening. When asked the question \"Please sign me up to receive news, health information and promotions. \", Amaya responded \"No\".    Amaya states her symptoms started 1-2 days ago.   Her symptoms consist of chills, malaise, facial pain or pressure, a sore throat, a cough, a headache, and myalgia. Amaya also feels feverish.   Symptom details     Cough: Amaya coughs a few times an hour and her cough is not more bothersome at night. Phlegm does not come into her throat when she coughs. She does not believe her cough is caused by post-nasal drip.     Sore throat: Amaya reports having mild throat pain (1-3 on a 10 point pain scale), does not have exudate on her tonsils, and can swallow liquids. The lymph nodes in her neck are not enlarged. A rash has not appeared on the skin since the sore throat started.     Temperature: Her current temperature is 100 degrees Fahrenheit.     Facial pain or pressure: The facial pain or pressure does not feel worse when bending or leaning forward.     Headache: She states the headache is moderate (4-6 on a 10 point pain scale).      Amaya denies having teeth pain, ageusia, diarrhea, nasal congestion, ear pain, anosmia, vomiting, rhinitis, nausea, wheezing, and enlarged lymph nodes. She also denies having a sinus infection within the past year and having recent facial or sinus surgery in the past 60 days. She is not experiencing dyspnea.   Precipitating events  Within the past week, Amaya has not been exposed to someone with strep throat. She has not recently been exposed to someone with influenza. " Amaya has been in close contact with the following high risk individuals: children under the age of 5 and people with asthma, heart disease or diabetes.   Pertinent COVID-19 (Coronavirus) information  In the past 14 days, Amaya has not worked in a congregate living setting.   She does not work or volunteer as healthcare worker or a  and does not work or volunteer in a healthcare facility.   Amaya also has not lived in a congregate living setting in the past 14 days. She does not live with a healthcare worker.   Amaya has not had a close contact with a laboratory-confirmed COVID-19 patient within 14 days of symptom onset.   Since December 2019, Amaya and has not had upper respiratory infection or influenza-like illness. Has not been diagnosed with lab-confirmed COVID-19 test   Pertinent medical history  Amaya has taken an antibiotic medication in the past month. Antibiotic details as reported by the patient (free text): Am currently taking Nitrofurantoin Mono-MCR for a bladder infection.  Started taking this medication this past Sunday.   Amaya does not get yeast infections when she takes antibiotics.   Amaya does not need a return to work/school note.   Weight: 134 lbs   Amaya does not smoke or use smokeless tobacco.   Weight: 134 lbs    MEDICATIONS: metoprolol tartrate oral, atorvastatin oral, nitrofurantoin monohydrate/macrocrystals oral, ALLERGIES: Penicillins  Clinician Response:  Dear Amaya,   Your symptoms show that you may have coronavirus (COVID-19). This illness can cause fever, cough and trouble breathing. Many people get a mild case and get better on their own. Some people can get very sick.  What should I do?  We would like to test you for this virus.   1. Please call [phone] to schedule your visit. Explain that [facility]. Be ready to share your [facility] visit ID number.  The following will serve as your written order for  "this COVID Test, ordered by me, for the indication of suspected COVID [Z20.828]: The test will be ordered in [facility], our electronic health record, after you are scheduled. It will show as ordered and authorized by Ethan Mandel MD.  Order: COVID-19 (Coronavirus) PCR [facility]TOMATIC [facility].      2. When it's time for your COVID test:  Stay at least 6 feet away from others. (If someone will drive you to your test, stay in the backseat, as far away from the  as you can.)   Cover your mouth and nose with a mask, tissue or washcloth.  Go straight to the testing site. Don't make any stops on the way there or back.      3.Starting now: Stay home and away from others (self-isolate) until:   You've had no fever---and no medicine that reduces fever---for one full day (24 hours). And...   Your other symptoms have gotten better. For example, your cough or breathing has improved. And...   At least 10 days have passed since your symptoms started.       During this time, don't leave the house except for testing or medical care.   Stay in your own room, even for meals. Use your own bathroom if you can.   Stay away from others in your home. No hugging, kissing or shaking hands. No visitors.  Don't go to work, school or anywhere else.    Clean \"high touch\" surfaces often (doorknobs, counters, handles, etc.). Use a household cleaning spray or wipes. You'll find a full list of  on the EPA website: www.epa.gov/pesticide-registration/list-n-disinfectants-use-against-sars-cov-2.   Cover your mouth and nose with a mask, tissue or washcloth to avoid spreading germs.  Wash your hands and face often. Use soap and water.  Caregivers in these groups are at risk for severe illness due to COVID-19:  o People 65 years and older  o People who live in a nursing home or long-term care facility  o People with chronic disease (lung, heart, cancer, diabetes, kidney, liver, immunologic)  o People who have a weakened immune system, " including those who:   Are in cancer treatment  Take medicine that weakens the immune system, such as corticosteroids  Had a bone marrow or organ transplant  Have an immune deficiency  Have poorly controlled HIV or AIDS  Are obese (body mass index of 40 or higher)  Smoke regularly   o Caregivers should wear gloves while washing dishes, handling laundry and cleaning bedrooms and bathrooms.  o Use caution when washing and drying laundry: Don't shake dirty laundry, and use the warmest water setting that you can.  o For more tips, go to www.cdc.gov/coronavirus/2019-ncov/downloads/10Things.pdf.    How can I take care of myself?    Get lots of rest. Drink extra fluids (unless a doctor has told you not to).   Take Tylenol (acetaminophen) for fever or pain. If you have liver or kidney problems, ask your family doctor if it's okay to take Tylenol.   Adults can take either:    650 mg (two 325 mg pills) every 4 to 6 hours, or...   1,000 mg (two 500 mg pills) every 8 hours as needed.    Note: Don't take more than 3,000 mg in one day. Acetaminophen is found in many medicines (both prescribed and over-the-counter medicines). Read all labels to be sure you don't take too much.   For children, check the Tylenol bottle for the right dose. The dose is based on the child's age or weight.    If you have other health problems (like cancer, heart failure, an organ transplant or severe kidney disease): Call your specialty clinic if you don't feel better in the next 2 days.       Know when to call 911. Emergency warning signs include:    Trouble breathing or shortness of breath Pain or pressure in the chest that doesn't go away Feeling confused like you haven't felt before, or not being able to wake up Bluish-colored lips or face.  Where can I get more information?    [facility] [place] -- About COVID-19: [email or web address]."[email or web address]/   CDC -- What to Do If You're Sick: www.cdc.gov/coronavirus/2019-ncov/about/steps-when-sick.html   CDC --  Ending Home Isolation: www.cdc.gov/coronavirus/2019-ncov/hcp/disposition-in-home-patients.html   CDC -- Caring for Someone: www.cdc.gov/coronavirus/2019-ncov/if-you-are-sick/care-for-someone.html   [facility] -- Interim Guidance for Hospital Discharge to Home: www.[facility].mn./diseases/coronavirus/hcp/hospdischarge.pdf   [facility] clinical trials (COVID-19 research studies): clinicalaffairs.[facility]/n-clinical-trials    Below are the COVID-19 hotlines at the [place] Department of Health ([facility]). Interpreters are available.    For health questions: Call 962-019-8056 or 1-943.572.8632 (7 a.m. to 7 p.m.) For questions about schools and childcare: Call 949-263-0788 or 1-190.954.7508 (7 a.m. to 7 p.m.)    Diagnosis: Acute upper respiratory infection, unspecified  Diagnosis ICD: J06.9

## 2020-09-22 NOTE — TELEPHONE ENCOUNTER
Refill request:    METOPROLOL 25 MG TAB    Summary: TAKE 1 TABLET BY MOUTH TWICE A DAY, Disp-120 tablet,R-0, E-Prescribe   Start: 7/22/2020  Ord/Sold: 7/22/2020

## 2020-09-22 NOTE — TELEPHONE ENCOUNTER
S: COVID-like symptoms     B: Treated for UTI starting 9/20/20, Cx Negative- advised to continue course of Macrobid (5 day course BID)     A:     Onset: Yesterday with COVID-like symptoms:     HA (terrible)   Body Aches   Occasional dry cough   Fever: Max 100.2    R: [email or web address] visit for screening for COVID-19, QUARANTINE- (do no visit 91 yr old father) until negative results and symptoms free, push fluids, rest, tylenol for body aches, fever, HA (avoid ibuprofen/aspirin)    Pt verbalized agreement to plan- she will submit [facility] visit today

## 2020-09-24 DIAGNOSIS — Z20.822 SUSPECTED 2019 NOVEL CORONAVIRUS INFECTION: ICD-10-CM

## 2020-09-24 PROCEDURE — U0003 INFECTIOUS AGENT DETECTION BY NUCLEIC ACID (DNA OR RNA); SEVERE ACUTE RESPIRATORY SYNDROME CORONAVIRUS 2 (SARS-COV-2) (CORONAVIRUS DISEASE [COVID-19]), AMPLIFIED PROBE TECHNIQUE, MAKING USE OF HIGH THROUGHPUT TECHNOLOGIES AS DESCRIBED BY CMS-2020-01-R: HCPCS | Performed by: FAMILY MEDICINE

## 2020-09-24 RX ORDER — METOPROLOL TARTRATE 25 MG/1
TABLET, FILM COATED ORAL
Qty: 180 TABLET | Refills: 3 | Status: SHIPPED | OUTPATIENT
Start: 2020-09-24 | End: 2021-09-14

## 2020-09-24 NOTE — TELEPHONE ENCOUNTER
Routing refill request to provider for review/approval because:  BP put of range.  Please authorize if appropriate.  Thanks,  Aixa Stone RN

## 2020-09-25 LAB
SARS-COV-2 RNA SPEC QL NAA+PROBE: NOT DETECTED
SPECIMEN SOURCE: NORMAL

## 2021-03-15 ENCOUNTER — TRANSFERRED RECORDS (OUTPATIENT)
Dept: HEALTH INFORMATION MANAGEMENT | Facility: CLINIC | Age: 71
End: 2021-03-15

## 2021-03-23 ENCOUNTER — TELEPHONE (OUTPATIENT)
Dept: FAMILY MEDICINE | Facility: CLINIC | Age: 71
End: 2021-03-23

## 2021-03-23 NOTE — TELEPHONE ENCOUNTER
Please abstract the following data from this visit with this patient into the appropriate field in Epic:    Tests that can be patient reported without a hard copy:    Mammogram done on this date: 03/15/2021 (approximately), by this group: [facility], results were Benign.     Cassie Lucero ,CMA

## 2021-05-14 ENCOUNTER — TELEPHONE (OUTPATIENT)
Dept: CARDIOLOGY | Facility: CLINIC | Age: 71
End: 2021-05-14

## 2021-05-14 DIAGNOSIS — I10 ESSENTIAL HYPERTENSION: ICD-10-CM

## 2021-05-14 DIAGNOSIS — I25.10 MILD CAD: Primary | ICD-10-CM

## 2021-05-14 NOTE — TELEPHONE ENCOUNTER
"Returned call from patient stating she has noticed her blood pressure increasing over the last 2 months. Previously BP 1teens/70's. Now running 140s/70-80's.  Patient states she takes her blood pressure periodically, does not take daily. She denies headache, throbbing. She states \"I just know my blood pressure is high.\" Told her Dr. Simental was out of the office today and I could review it with him next week. She stated that was fine, she was curious if she should adjust her medications.    Nai Guzmán RN  [facility]        "

## 2021-05-19 NOTE — TELEPHONE ENCOUNTER
Reviewed patient call with Dr. Simental. He would like patient to have pre-clinic echo. He stated patient could be seen by Dr. Palm (PCP) for BP check and to adjust meds if needed prior to appt.    Telephoned patient to relay these findings. Left detailed voicemail for patient, stating scheduling would contact for echo scheduling. Direct contact information provided.    Nai Guzmán RN  [facility]

## 2021-06-14 ENCOUNTER — HOSPITAL ENCOUNTER (OUTPATIENT)
Dept: BONE DENSITY | Facility: CLINIC | Age: 71
Discharge: HOME | End: 2021-06-14
Attending: INTERNAL MEDICINE | Admitting: INTERNAL MEDICINE
Payer: MEDICARE

## 2021-06-14 DIAGNOSIS — M85.88 OTHER SPECIFIED DISORDERS OF BONE DENSITY AND STRUCTURE, OTHER SITE: ICD-10-CM

## 2021-06-14 DIAGNOSIS — M94.9 DISORDER OF BONE AND CARTILAGE: ICD-10-CM

## 2021-06-14 DIAGNOSIS — M89.9 DISORDER OF BONE AND CARTILAGE: ICD-10-CM

## 2021-06-14 PROCEDURE — 77080 DXA BONE DENSITY AXIAL: CPT

## 2021-07-06 ENCOUNTER — HOSPITAL ENCOUNTER (OUTPATIENT)
Dept: CARDIOLOGY | Facility: CLINIC | Age: 71
Discharge: HOME | End: 2021-07-06
Attending: INTERNAL MEDICINE | Admitting: INTERNAL MEDICINE
Payer: COMMERCIAL

## 2021-07-06 DIAGNOSIS — I10 ESSENTIAL HYPERTENSION: ICD-10-CM

## 2021-07-06 DIAGNOSIS — I25.10 MILD CAD: ICD-10-CM

## 2021-07-06 LAB
LVEF ECHO: NORMAL
LVEF ECHO: NORMAL

## 2021-07-06 PROCEDURE — 93306 TTE W/DOPPLER COMPLETE: CPT | Mod: 26 | Performed by: INTERNAL MEDICINE

## 2021-07-06 PROCEDURE — 93306 TTE W/DOPPLER COMPLETE: CPT

## 2021-07-08 ENCOUNTER — OFFICE VISIT (OUTPATIENT)
Dept: CARDIOLOGY | Facility: CLINIC | Age: 71
End: 2021-07-08
Payer: COMMERCIAL

## 2021-07-08 VITALS
SYSTOLIC BLOOD PRESSURE: 140 MMHG | BODY MASS INDEX: 22.02 KG/M2 | WEIGHT: 137 LBS | DIASTOLIC BLOOD PRESSURE: 70 MMHG | HEART RATE: 55 BPM | HEIGHT: 66 IN

## 2021-07-08 DIAGNOSIS — I25.10 CORONARY ARTERY DISEASE INVOLVING NATIVE CORONARY ARTERY OF NATIVE HEART WITHOUT ANGINA PECTORIS: Primary | ICD-10-CM

## 2021-07-08 PROCEDURE — 99213 OFFICE O/P EST LOW 20 MIN: CPT | Performed by: INTERNAL MEDICINE

## 2021-07-08 ASSESSMENT — MIFFLIN-ST. JEOR: SCORE: 1158.18

## 2021-07-08 NOTE — LETTER
7/8/2021    Carline Palm MD  [address]  [facility] [address]    RE: Amaya Huff       Dear Colleague,    I had the pleasure of seeing Amaya Huff in the [facility].    REASON FOR CONSULTATION:  Followup for coronary artery disease and palpitations.      HISTORY OF PRESENT ILLNESS:  I had the pleasure of seeing Amaya Huff at [facility] in [place] this afternoon.  She is a very pleasant 71-year-old female with history of mild coronary artery disease, mitral valve prolapse with mild mitral valve regurgitation and premature ventricular contractions.      I initially saw her severals years ago for evaluation of chest pain.  She underwent stress test which was abnormal.  She later had a CTA which suggested 3-vessel coronary artery disease.  This prompted coronary angiogram which demonstrated mild to moderate coronary artery disease.  Her left ventricle filling pressures were elevated, thought to be secondary to diastolic dysfunction.  There may have been a component of coronary spasm as well.  She was commenced on excellent medical program including statin, low-dose aspirin, metoprolol and isosorbide mononitrate.  Her symptoms have since resolved.  We ultimately discontinued Imdur.  She is currently on low-dose metoprolol, statin and aspirin.      She also has history of palpitations due to PVCs.  Since our last visit 2 years ago, she has not had any significant symptoms.  She has had a lot of stresses lately.  As a result, she thinks her blood pressure might be a little on the higher side.  Her blood pressure in the office today is 140/70      IMPRESSION AND PLAN:   1.  Mild coronary artery disease, stable.   2.  History of PVCs and palpitations, stable.   3.  History of mitral valve prolapse with mild mitral valve regurgitation.      Ms. Huff is doing quite well from a cardiac point of view.  She does not endorse any  significant symptoms at this point.  Her risk factors are well controlled although blood pressure continues to be borderline elevated.  Have asked her to continue to monitor blood pressure at home.  If it is persistently above 140 would recommend adding low-dose ACE inhibitor to her regimen.    I reviewed her recent transthoracic echocardiogram.  The echo shows preserved LV function no significant valvular heart disease.     It was a pleasure seeing Ms. Huff in clinic this morning.  I appreciate the opportunity to be part of her care.  I will see her in 2 to 5 years as needed.        GUILLERMINA CARIAS MD      Orders Placed This Encounter   Procedures     Follow-Up with Cardiologist       No orders of the defined types were placed in this encounter.      There are no discontinued medications.      Encounter Diagnosis   Name Primary?     Coronary artery disease involving native coronary artery of native heart without angina pectoris Yes       CURRENT MEDICATIONS:  Current Outpatient Medications   Medication Sig Dispense Refill     aspirin 81 MG tablet Take 81 mg by mouth daily       atorvastatin (LIPITOR) 20 MG tablet Take 1 tablet (20 mg) by mouth daily 90 tablet 4     Calcium Carbonate-Vitamin D (CALCIUM + D PO) Take 1 chew tab by mouth 2 times daily 1300 mg daily  (650 each)       Cholecalciferol (VITAMIN D3 PO) Take 5,000 Units by mouth daily        metoprolol tartrate (LOPRESSOR) 25 MG tablet TAKE 1 TABLET BY MOUTH TWICE A  tablet 3     Omega-3 Fatty Acids (OMEGA-3 FISH OIL PO) Take 1 g by mouth 2 times daily (with meals) 1280  Mg       tretinoin (RETIN-A) 0.025 % external cream APPLY TO FACE EVERY DAY AT BEDTIME       Biotin 3 MG TABS Take 1,000 mcg by mouth daily        COLLAGEN PO        fluorouracil 5 % SOLN Apply to facial spots on Saturday & Sunday       triamcinolone (KENALOG) 0.1 % external cream Apply topically every other day         ALLERGIES     Allergies   Allergen Reactions     Penicillins      " \"severe migraines\"       PAST MEDICAL HISTORY:  Past Medical History:   Diagnosis Date     Coronary artery disease     Mild to moderate CAD noted on 3/18/14 coronary angiogram      Heart palpitations      History of angina      Hyperlipidemia with target LDL less than 130 3/24/2015     Diagnosis updated by automated process. Provider to review and confirm.     Irregular heartbeat      Mitral valve disorders(424.0)      Unspecified essential hypertension        PAST SURGICAL HISTORY:  Past Surgical History:   Procedure Laterality Date     COLONOSCOPY  2012    Procedure:COLONOSCOPY; COLONOSCOPY; Surgeon:GINGER PARKS; Location: GI     COLONOSCOPY N/A 2017    Procedure: COLONOSCOPY;  colonoscopy;  Surgeon: Dez Siegel MD;  Location:  GI     CORONARY ANGIOGRAPHY ADULT ORDER  3/18/14    3/18/14- Mild to moderate CAD, no interventions, treat medically.     ENT SURGERY  tonsils     GYN SURGERY  c sections     Z NONSPECIFIC PROCEDURE      S/P T&A     ZZC NONSPECIFIC PROCEDURE      LASER FIBROIDS     Z NONSPECIFIC PROCEDURE       x2       FAMILY HISTORY:  Family History   Problem Relation Age of Onset     C.A.D. Mother      Parkinsonism Mother 80     Alcohol/Drug Daughter 30     Cerebrovascular Disease Son 40        stroke, PFO     Circulatory Maternal Grandmother         MVP     Circulatory Maternal Aunt         MVP       SOCIAL HISTORY:  Social History     Socioeconomic History     Marital status:      Spouse name: None     Number of children: None     Years of education: None     Highest education level: None   Occupational History     None   Social Needs     Financial resource strain: None     Food insecurity     Worry: None     Inability: None     Transportation needs     Medical: None     Non-medical: None   Tobacco Use     Smoking status: Never Smoker     Smokeless tobacco: Never Used   Substance and Sexual Activity     Alcohol use: Yes     Alcohol/week: 0.0 " "standard drinks     Comment: 1 glass wine/day     Drug use: No     Sexual activity: Yes     Partners: Male   Lifestyle     Physical activity     Days per week: None     Minutes per session: None     Stress: None   Relationships     Social connections     Talks on phone: None     Gets together: None     Attends Mu-ism service: None     Active member of club or organization: None     Attends meetings of clubs or organizations: None     Relationship status: None     Intimate partner violence     Fear of current or ex partner: None     Emotionally abused: None     Physically abused: None     Forced sexual activity: None   Other Topics Concern     Parent/sibling w/ CABG, MI or angioplasty before 65F 55M? Not Asked      Service Not Asked     Blood Transfusions Not Asked     Caffeine Concern No     Comment: coffee: 2 cups a day     Occupational Exposure Not Asked     Hobby Hazards Not Asked     Sleep Concern No     Stress Concern No     Weight Concern Not Asked     Special Diet Yes     Comment: little red meat, a lot of veggies & fruit     Back Care Not Asked     Exercise Yes     Comment: walk and weight lifiting 5-6 x a week     Bike Helmet Not Asked     Seat Belt Not Asked     Self-Exams Not Asked   Social History Narrative          had MI 47        Remarried       Review of Systems:  Skin:  Negative       Eyes:  Negative      ENT:  Negative      Respiratory:  Negative       Cardiovascular:  Negative      Gastroenterology: Negative      Genitourinary:  Negative      Musculoskeletal:  Negative      Neurologic:  Negative      Psychiatric:  Negative      Heme/Lymph/Imm:  Negative      Endocrine:  Negative        Physical Exam:  Vitals: BP (!) 140/70   Pulse 55   Ht 1.676 m (5' 6\")   Wt 62.1 kg (137 lb)   BMI 22.11 kg/m      Constitutional:  cooperative;in no acute distress        Skin:  warm and dry to the touch;warm and dry to the touch, no apparent skin lesions or masses noted          Head:  " normocephalic;normocephalic, no masses or lesions        Eyes:           Lymph:      ENT:  no pallor or cyanosis;no pallor or cyanosis, dentition good        Neck:  JVP normal;carotid pulses are full and equal bilaterally;no carotid bruit        Respiratory:  clear to auscultation         Cardiac: regular rhythm;no murmurs, gallops or rubs detected;normal S1 and S2                pulses full and equal;pulses full and equal, no bruits auscultated                                        GI:  abdomen soft;non-tender;no bruits        Extremities and Muscular Skeletal:  no edema;no deformities, clubbing, cyanosis, erythema observed              Neurological:  no gross motor deficits        Psych:  Alert and Oriented x 3        CC  No referring provider defined for this encounter.                  Thank you for allowing me to participate in the care of your patient.      Sincerely,     Josh Simental MD, MD     [facility]  cc:   No referring provider defined for this encounter.

## 2021-07-08 NOTE — PROGRESS NOTES
REASON FOR CONSULTATION:  Followup for coronary artery disease and palpitations.      HISTORY OF PRESENT ILLNESS:  I had the pleasure of seeing Amaya Huff at [facility] in [place] this afternoon.  She is a very pleasant 71-year-old female with history of mild coronary artery disease, mitral valve prolapse with mild mitral valve regurgitation and premature ventricular contractions.      I initially saw her severals years ago for evaluation of chest pain.  She underwent stress test which was abnormal.  She later had a CTA which suggested 3-vessel coronary artery disease.  This prompted coronary angiogram which demonstrated mild to moderate coronary artery disease.  Her left ventricle filling pressures were elevated, thought to be secondary to diastolic dysfunction.  There may have been a component of coronary spasm as well.  She was commenced on excellent medical program including statin, low-dose aspirin, metoprolol and isosorbide mononitrate.  Her symptoms have since resolved.  We ultimately discontinued Imdur.  She is currently on low-dose metoprolol, statin and aspirin.      She also has history of palpitations due to PVCs.  Since our last visit 2 years ago, she has not had any significant symptoms.  She has had a lot of stresses lately.  As a result, she thinks her blood pressure might be a little on the higher side.  Her blood pressure in the office today is 140/70      IMPRESSION AND PLAN:   1.  Mild coronary artery disease, stable.   2.  History of PVCs and palpitations, stable.   3.  History of mitral valve prolapse with mild mitral valve regurgitation.      Ms. Huff is doing quite well from a cardiac point of view.  She does not endorse any significant symptoms at this point.  Her risk factors are well controlled although blood pressure continues to be borderline elevated.  Have asked her to continue to monitor blood pressure at home.  If it is persistently above 140 would recommend adding  "low-dose ACE inhibitor to her regimen.    I reviewed her recent transthoracic echocardiogram.  The echo shows preserved LV function no significant valvular heart disease.     It was a pleasure seeing Ms. Huff in clinic this morning.  I appreciate the opportunity to be part of her care.  I will see her in 2 to 5 years as needed.        GUILLERMINA CARIAS MD      Orders Placed This Encounter   Procedures     Follow-Up with Cardiologist       No orders of the defined types were placed in this encounter.      There are no discontinued medications.      Encounter Diagnosis   Name Primary?     Coronary artery disease involving native coronary artery of native heart without angina pectoris Yes       CURRENT MEDICATIONS:  Current Outpatient Medications   Medication Sig Dispense Refill     aspirin 81 MG tablet Take 81 mg by mouth daily       atorvastatin (LIPITOR) 20 MG tablet Take 1 tablet (20 mg) by mouth daily 90 tablet 4     Calcium Carbonate-Vitamin D (CALCIUM + D PO) Take 1 chew tab by mouth 2 times daily 1300 mg daily  (650 each)       Cholecalciferol (VITAMIN D3 PO) Take 5,000 Units by mouth daily        metoprolol tartrate (LOPRESSOR) 25 MG tablet TAKE 1 TABLET BY MOUTH TWICE A  tablet 3     Omega-3 Fatty Acids (OMEGA-3 FISH OIL PO) Take 1 g by mouth 2 times daily (with meals) 1280  Mg       tretinoin (RETIN-A) 0.025 % external cream APPLY TO FACE EVERY DAY AT BEDTIME       Biotin 3 MG TABS Take 1,000 mcg by mouth daily        COLLAGEN PO        fluorouracil 5 % SOLN Apply to facial spots on Saturday & Sunday       triamcinolone (KENALOG) 0.1 % external cream Apply topically every other day         ALLERGIES     Allergies   Allergen Reactions     Penicillins      \"severe migraines\"       PAST MEDICAL HISTORY:  Past Medical History:   Diagnosis Date     Coronary artery disease     Mild to moderate CAD noted on 3/18/14 coronary angiogram      Heart palpitations      History of angina      Hyperlipidemia with " target LDL less than 130 3/24/2015     Diagnosis updated by automated process. Provider to review and confirm.     Irregular heartbeat      Mitral valve disorders(424.0)      Unspecified essential hypertension        PAST SURGICAL HISTORY:  Past Surgical History:   Procedure Laterality Date     COLONOSCOPY  2012    Procedure:COLONOSCOPY; COLONOSCOPY; Surgeon:GINGER PARKS; Location: GI     COLONOSCOPY N/A 2017    Procedure: COLONOSCOPY;  colonoscopy;  Surgeon: Dez Siegel MD;  Location:  GI     CORONARY ANGIOGRAPHY ADULT ORDER  3/18/14    3/18/14- Mild to moderate CAD, no interventions, treat medically.     ENT SURGERY  tonsils     GYN SURGERY  c sections     ZZC NONSPECIFIC PROCEDURE      S/P T&A     ZZC NONSPECIFIC PROCEDURE      LASER FIBROIDS     ZZC NONSPECIFIC PROCEDURE       x2       FAMILY HISTORY:  Family History   Problem Relation Age of Onset     C.A.D. Mother      Parkinsonism Mother 80     Alcohol/Drug Daughter 30     Cerebrovascular Disease Son 40        stroke, PFO     Circulatory Maternal Grandmother         MVP     Circulatory Maternal Aunt         MVP       SOCIAL HISTORY:  Social History     Socioeconomic History     Marital status:      Spouse name: None     Number of children: None     Years of education: None     Highest education level: None   Occupational History     None   Social Needs     Financial resource strain: None     Food insecurity     Worry: None     Inability: None     Transportation needs     Medical: None     Non-medical: None   Tobacco Use     Smoking status: Never Smoker     Smokeless tobacco: Never Used   Substance and Sexual Activity     Alcohol use: Yes     Alcohol/week: 0.0 standard drinks     Comment: 1 glass wine/day     Drug use: No     Sexual activity: Yes     Partners: Male   Lifestyle     Physical activity     Days per week: None     Minutes per session: None     Stress: None   Relationships     Social connections  "    Talks on phone: None     Gets together: None     Attends Temple service: None     Active member of club or organization: None     Attends meetings of clubs or organizations: None     Relationship status: None     Intimate partner violence     Fear of current or ex partner: None     Emotionally abused: None     Physically abused: None     Forced sexual activity: None   Other Topics Concern     Parent/sibling w/ CABG, MI or angioplasty before 65F 55M? Not Asked      Service Not Asked     Blood Transfusions Not Asked     Caffeine Concern No     Comment: coffee: 2 cups a day     Occupational Exposure Not Asked     Hobby Hazards Not Asked     Sleep Concern No     Stress Concern No     Weight Concern Not Asked     Special Diet Yes     Comment: little red meat, a lot of veggies & fruit     Back Care Not Asked     Exercise Yes     Comment: walk and weight lifiting 5-6 x a week     Bike Helmet Not Asked     Seat Belt Not Asked     Self-Exams Not Asked   Social History Narrative          had MI 47        Remarried       Review of Systems:  Skin:  Negative       Eyes:  Negative      ENT:  Negative      Respiratory:  Negative       Cardiovascular:  Negative      Gastroenterology: Negative      Genitourinary:  Negative      Musculoskeletal:  Negative      Neurologic:  Negative      Psychiatric:  Negative      Heme/Lymph/Imm:  Negative      Endocrine:  Negative        Physical Exam:  Vitals: BP (!) 140/70   Pulse 55   Ht 1.676 m (5' 6\")   Wt 62.1 kg (137 lb)   BMI 22.11 kg/m      Constitutional:  cooperative;in no acute distress        Skin:  warm and dry to the touch;warm and dry to the touch, no apparent skin lesions or masses noted          Head:  normocephalic;normocephalic, no masses or lesions        Eyes:           Lymph:      ENT:  no pallor or cyanosis;no pallor or cyanosis, dentition good        Neck:  JVP normal;carotid pulses are full and equal bilaterally;no carotid bruit    "     Respiratory:  clear to auscultation         Cardiac: regular rhythm;no murmurs, gallops or rubs detected;normal S1 and S2                pulses full and equal;pulses full and equal, no bruits auscultated                                        GI:  abdomen soft;non-tender;no bruits        Extremities and Muscular Skeletal:  no edema;no deformities, clubbing, cyanosis, erythema observed              Neurological:  no gross motor deficits        Psych:  Alert and Oriented x 3        CC  No referring provider defined for this encounter.

## 2021-08-29 ENCOUNTER — HEALTH MAINTENANCE LETTER (OUTPATIENT)
Age: 71
End: 2021-08-29

## 2021-09-13 DIAGNOSIS — I25.10 MILD CAD: ICD-10-CM

## 2021-09-14 RX ORDER — METOPROLOL TARTRATE 25 MG/1
TABLET, FILM COATED ORAL
Qty: 180 TABLET | Refills: 3 | Status: SHIPPED | OUTPATIENT
Start: 2021-09-14 | End: 2021-10-01

## 2021-09-14 NOTE — TELEPHONE ENCOUNTER
Routing refill request to provider for review/approval because:  BPs high   BP Readings from Last 3 Encounters:   07/08/21 (!) 140/70   11/19/19 (!) 150/82   04/04/19 120/74

## 2021-09-15 ENCOUNTER — OFFICE VISIT (OUTPATIENT)
Dept: URGENT CARE | Facility: URGENT CARE | Age: 71
End: 2021-09-15
Payer: COMMERCIAL

## 2021-09-15 ENCOUNTER — NURSE TRIAGE (OUTPATIENT)
Dept: FAMILY MEDICINE | Facility: CLINIC | Age: 71
End: 2021-09-15

## 2021-09-15 VITALS
TEMPERATURE: 97.9 F | HEART RATE: 68 BPM | DIASTOLIC BLOOD PRESSURE: 72 MMHG | RESPIRATION RATE: 16 BRPM | SYSTOLIC BLOOD PRESSURE: 135 MMHG | OXYGEN SATURATION: 100 %

## 2021-09-15 DIAGNOSIS — E78.5 HYPERLIPIDEMIA LDL GOAL <70: ICD-10-CM

## 2021-09-15 DIAGNOSIS — R19.7 DIARRHEA, UNSPECIFIED TYPE: Primary | ICD-10-CM

## 2021-09-15 PROCEDURE — 99213 OFFICE O/P EST LOW 20 MIN: CPT | Performed by: PHYSICIAN ASSISTANT

## 2021-09-15 PROCEDURE — 87209 SMEAR COMPLEX STAIN: CPT | Performed by: PHYSICIAN ASSISTANT

## 2021-09-15 PROCEDURE — 87506 IADNA-DNA/RNA PROBE TQ 6-11: CPT | Performed by: PHYSICIAN ASSISTANT

## 2021-09-15 PROCEDURE — 87329 GIARDIA AG IA: CPT | Performed by: PHYSICIAN ASSISTANT

## 2021-09-15 PROCEDURE — 87328 CRYPTOSPORIDIUM AG IA: CPT | Performed by: PHYSICIAN ASSISTANT

## 2021-09-15 PROCEDURE — 87177 OVA AND PARASITES SMEARS: CPT | Performed by: PHYSICIAN ASSISTANT

## 2021-09-15 RX ORDER — ATORVASTATIN CALCIUM 20 MG/1
20 TABLET, FILM COATED ORAL DAILY
Qty: 30 TABLET | Refills: 0 | Status: SHIPPED | OUTPATIENT
Start: 2021-09-15 | End: 2021-10-01

## 2021-09-15 NOTE — PATIENT INSTRUCTIONS
Patient Education     Treating Diarrhea  Diarrhea happens when you have loose, watery, or frequent bowel movements. It is a common problem with many causes. Most cases of diarrhea clear up on their own. But certain cases may need treatment. Be sure to see your healthcare provider if your symptoms don't get better in a few days.   Getting relief  Treatment of diarrhea depends on its cause. Diarrhea caused by bacterial or parasite infection is often treated with antibiotics. Diarrhea caused by other factors, such as a stomach virus, often improves with simple home treatment. The tips below may also help ease your symptoms.       Drink plenty of fluids. This helps prevent too much fluid loss (dehydration). Water, clear soups, and electrolyte solutions are good choices. Don't take alcohol, coffee, tea, or milk. These can irritate your intestines and make symptoms worse.    Suck on ice chips if drinking makes you queasy.    Return to your normal diet slowly. You may want to eat bland foods at first, such as rice and toast. Also, you may need to stay away from certain foods for a while, such as dairy products. These can make symptoms worse. Ask your healthcare provider if there are any other foods you should stay away from.    If you were prescribed antibiotics, take them as directed.    Don't take anti-diarrhea medicines without asking your provider first.  Call your healthcare provider   Call your healthcare provider if you have any of the following:      A fever of 100.4  F ( 38.0 C) or higher, or as directed by your provider    Chills    Severe pain    Worsening diarrhea or diarrhea for more than 2 days    Bloody vomit or stool    Signs of dehydration (dizziness, dry mouth and tongue, rapid pulse, dark urine)  [employer] last reviewed this educational content on 6/1/2019 2000-2021 [employer]. All rights reserved. This information is not intended as a substitute for professional medical care. Always  follow your healthcare professional's instructions.

## 2021-09-15 NOTE — PROGRESS NOTES
Assessment & Plan     Diarrhea, unspecified type  Symptoms are waxing and waning for the past 10 days.  On exam she is in no acute distress.  Nontoxic-appearing.  Stool culture, Ova parasites, C diff, Crypto/Giardia pending.  Patient will be notified if any abnormal results.  In the interim, I have recommended Imodium as needed for the diarrhea, probiotics.  Follow-up if any worsening symptoms.  Patient agrees with the plan.  - Clostridium difficile Toxin B PCR  - Ova and Parasite Exam Routine  - Enteric Bacteria and Virus Panel by [facility]  - Cryptosporidium/Giardia Immunoassay  - Ova and Parasite Exam Routine  - Enteric Bacteria and Virus Panel by [facility]  - Cryptosporidium/Giardia Immunoassay         Return in about 10 days (around 9/25/2021) for Symptoms failing to improve.    Libby Choi PA-C  [facility] [place]    Luis Antonio Conde is a 71 year old female who presents to clinic today for the following health issues:  Chief Complaint   Patient presents with     Urgent Care     Diarrhea     Patient went to [facility] 09/02-09/12-Started having diarrhea 12days ago on and off-OTC Pepto Bismol which helped      HPI    She is presenting to urgent care today with complaint of diarrhea.  She was in [place] 9/01-through 9/12. Diarrhea started 3 days after their arrival  No abdominal pain. No fever/chills/vomiting/nausea    Gastro    Onset of symptoms was 10 day(s) ago.  Course of illness is waxing and waning.    Severity mild  Current and Associated symptoms:  diarrhea  Aggravating factors: nothing.    Alleviating factors: treatment tried-- pepto bismol  Diarrhea: Yes  3 stools/day and is persisting  Stools: watery, runny and loose. Non bloody.  Vomitting: No  Appetite: normal  Risk factors: possible bad food exposure and travel   Denies: sick contacts, recent antibiotic use, recent hospitalization, recent medication changes and hx of IBS  She tested negative for Covid 4 days ago.    Review  of Systems  Constitutional, HEENT, cardiovascular, pulmonary, GI, , musculoskeletal, neuro, skin, endocrine and psych systems are negative, except as otherwise noted.      Objective    /72   Pulse 68   Temp 97.9  F (36.6  C) (Tympanic)   Resp 16   SpO2 100%   Breastfeeding No   Physical Exam   GENERAL: healthy, alert and no distress  RESP: lungs clear to auscultation - no rales, rhonchi or wheezes  CV: regular rate and rhythm, normal S1 S2,   ABDOMEN: soft, non-tender, no masses and bowel sounds normal    No results found for this or any previous visit (from the past 24 hour(s)).

## 2021-09-15 NOTE — TELEPHONE ENCOUNTER
Patient reports she has had diarrhea for 10 days after recent travel to [place]. Denied fever, weakness or abdominal pain. Had negative COVID test. Pepto Bistmol has offered some relief. Pt read label not to take more than a couple days. Advised she see UC today. Pt agreeable with plan.     She is also requesting refill for atorvastatin prior to physical. [place] refill approved.     Reason for Disposition    MODERATE diarrhea (e.g., 4-6 times / day more than normal) and present > 48 hours (2 days)    Additional Information    Negative: Shock suspected (e.g., cold/pale/clammy skin, too weak to stand, low BP, rapid pulse)    Negative: Difficult to awaken or acting confused (e.g., disoriented, slurred speech)    Negative: Sounds like a life-threatening emergency to the triager    Negative: Vomiting also present and worse than the diarrhea    Negative: Blood in stool and without diarrhea    Negative: SEVERE abdominal pain (e.g., excruciating) and present > 1 hour    Negative: SEVERE abdominal pain and age > 60    Negative: Bloody, black, or tarry bowel movements (Exception: chronic-unchanged black-grey bowel movements and is taking iron pills or Pepto-bismol)    Negative: SEVERE diarrhea (e.g., 7 or more times / day more than normal) and age > 60 years    Negative: Constant abdominal pain lasting > 2 hours    Negative: Drinking very little and has signs of dehydration (e.g., no urine > 12 hours, very dry mouth, very lightheaded)    Negative: Patient sounds very sick or weak to the triager    Negative: SEVERE diarrhea (e.g., 7 or more times / day more than normal) and present > 24 hours (1 day)    Protocols used: DIARRHEA-A-OH

## 2021-09-16 LAB
C COLI+JEJUNI+LARI FUSA STL QL NAA+PROBE: NOT DETECTED
C PARVUM AG STL QL IA: NEGATIVE
EC STX1 GENE STL QL NAA+PROBE: NOT DETECTED
EC STX2 GENE STL QL NAA+PROBE: NOT DETECTED
G LAMBLIA AG STL QL IA: NEGATIVE
NOROV GI+II ORF1-ORF2 JNC STL QL NAA+PR: NOT DETECTED
O+P STL MICRO: NEGATIVE
RVA NSP5 STL QL NAA+PROBE: NOT DETECTED
SALMONELLA SP RPOD STL QL NAA+PROBE: NOT DETECTED
SHIGELLA SP+EIEC IPAH STL QL NAA+PROBE: NOT DETECTED
V CHOL+PARA RFBL+TRKH+TNAA STL QL NAA+PR: NOT DETECTED
Y ENTERO RECN STL QL NAA+PROBE: NOT DETECTED

## 2021-09-24 ENCOUNTER — OFFICE VISIT (OUTPATIENT)
Dept: FAMILY MEDICINE | Facility: CLINIC | Age: 71
End: 2021-09-24
Payer: COMMERCIAL

## 2021-09-24 ENCOUNTER — NURSE TRIAGE (OUTPATIENT)
Dept: FAMILY MEDICINE | Facility: CLINIC | Age: 71
End: 2021-09-24

## 2021-09-24 VITALS
HEART RATE: 67 BPM | SYSTOLIC BLOOD PRESSURE: 133 MMHG | BODY MASS INDEX: 21.69 KG/M2 | WEIGHT: 135 LBS | TEMPERATURE: 99.1 F | OXYGEN SATURATION: 100 % | DIASTOLIC BLOOD PRESSURE: 79 MMHG | HEIGHT: 66 IN | RESPIRATION RATE: 18 BRPM

## 2021-09-24 DIAGNOSIS — R19.5 LOOSE STOOLS: Primary | ICD-10-CM

## 2021-09-24 PROCEDURE — 99213 OFFICE O/P EST LOW 20 MIN: CPT | Performed by: INTERNAL MEDICINE

## 2021-09-24 RX ORDER — TEA TREE OIL 100 %
OIL (ML) TOPICAL
Status: ON HOLD | COMMUNITY
End: 2022-05-17

## 2021-09-24 ASSESSMENT — MIFFLIN-ST. JEOR: SCORE: 1149.11

## 2021-09-24 NOTE — PROGRESS NOTES
"    Assessment & Plan     Loose stools  3 weeks.   Discussed possible etiologies. Most likely viral given recent travel and nonrevealing stool studies  Recommend BRAT diet/dietary modifications. Discussed advancing slowly and as tolerated. Back track if worsening with specific advancement. She is agreeable and educational material printed.   Imodium OTC as prescribed on packing if needed for symptomatic relief.  She has follow-up with PCP in one week for physical.   Call sooner if worsening or not improving.    Return if symptoms worsen or fail to improve.    Sapphire Recinos,   [facility] CASS[facility] Conde is a 71 year old who presents for the following health issues     HPI     ED/UC Followup:    Facility:  [facility] [place]  Date of visit: 9/15/2021  Reason for visit: Diarrhea  Current Status: Still having bouts of diarrhea.        Sept 4th developed diarrhea while in [place]. Figured something she ate. Was there for 10 days, back on the 12th. Got pepto bismol OTC. That helped.    Was evaluated in urgent care, negative stool studies.    Continues to have intermittent flares.    Was not aware of BRAT diet.    This week. T/W, no diarrhea. However last night 3-4 episodes loose stools. Yesterday had urgency. No accidents. No n/v. No trouble eating or drinking. No blood in stool. No f/c.  No abd pain. Feeling otherwise well.    Review of Systems   Constitutional, HEENT, cardiovascular, pulmonary, gi and gu systems are negative, except as otherwise noted.      Objective    /79 (BP Location: Left arm, Cuff Size: Adult Regular)   Pulse 67   Temp 99.1  F (37.3  C) (Temporal)   Resp 18   Ht 1.676 m (5' 6\")   Wt 61.2 kg (135 lb)   SpO2 100%   Breastfeeding No   BMI 21.79 kg/m    Body mass index is 21.79 kg/m .  Physical Exam     GENERAL APPEARANCE: AAOx3, no distress. Well developed.     PSYCH: appropriate mood and affect.           "

## 2021-09-24 NOTE — PATIENT INSTRUCTIONS
Patient Education     Viral Diarrhea (Adult)    Diarrhea caused by a virus is often called viral gastroenteritis. Many people call it the stomach flu, but it has nothing to do with the flu. The virus that causes diarrhea affects the stomach and intestinal tract. It often lasts from 2 to 7 days. Diarrhea is the passing of loose, watery stools 3 or more times a day.   Symptoms  Along with diarrhea, you may have these symptoms:    Belly (abdominal) pain and cramping    Nausea and vomiting    Loss of bowel control    Fever and chills    Bloody stools  The danger from repeated diarrhea is dehydration. This is when your body loses too much water and other fluids.   Antibiotics don't work well in treating this illness. But there are things you can do at home that will help.   Home care  Follow these home care tips:    If symptoms are severe, rest at home for the next 24 hours or until you are feeling better.    Wash your hands with soap and water or an alcohol-based . This helps prevent the spread of infection. Wash your hands after touching anyone who is sick.    Teach all people in your home when and how to wash their hands Wet your hands with clean, running water. Lather the backs of your hands, between your fingers, and under your nails. Scrub your hands for at least 20 seconds. If you need a timer, try humming the  Happy Birthday  song from beginning to end twice. Rinse your hands well. Dry them with a clean towel.    Wash your hands after using the toilet and before meals. Clean the toilet after each use.  Food preparation:    People with diarrhea should not make food for others. When making food, wash your hands after touching anyone who is sick.    Wash your hands after using items that have been in contact with raw food. This includes cutting boards, countertops, and knives.    Keep uncooked meats away from cooked and ready-to-eat foods.  Medicines:    You may use acetaminophen or nonsteroidal  anti-inflammatory drugs (NSAIDS) such as ibuprofen or naproxen to control fever unless another medicine was prescribed. In addition:  ? Talk with your healthcare provider before using these medicines if you have chronic liver or kidney disease, or ever had a stomach ulcer or GI (gastrointestinal) bleeding.  ? Don t give aspirin (or medicine that contains aspirin) to anyone younger than age 19 unless directed by the provider. Taking aspirin can put them at risk for Reye syndrome. This is a rare but very serious disorder. It most often affects the brain and the liver.  ? Don't use NSAID medicines if you are already taking one for another condition (such as arthritis) or if you are taking aspirin (such as for heart disease or after a stroke).    Anti-diarrhea medicine should be taken for this condition only if advised by your healthcare provider. Sometimes it can make your condition worse. If you have bloody diarrhea or fever, check with your provider before taking this type of medicine.  Diet:    Water and clear liquids are important so you don't get dehydrated. Drink small amounts at a time. Don't guzzle it down. If you are very dehydrated, sports drinks aren't a good choice. They have too much sugar and not enough electrolytes. In this case, commercially available products called oral rehydration solutions are best.    Caffeine, tobacco, and alcohol can make the diarrhea, cramping, and pain worse. Try to stop using these until you are fully recovered.    Don't force yourself to eat, especially if you have cramping, vomiting, or diarrhea. Don't eat large amounts at a time, even if you are hungry. It may make you feel worse.    If you eat, don't have fatty, greasy, spicy, or fried foods.    Don't have any dairy products, as they can make diarrhea worse.  During the first 24 hours (the first full day) follow the diet below:     Drinks: Water, clear liquids, soft drinks without caffeine; ginger ale, mineral water (plain  or flavored), decaffeinated tea and coffee    Soups: Clear broth, consommé, and bouillon    Desserts: Plain gelatin, ice pops, and fruit juice bars  During the next 24 hours (the second day) you may add these to the above if you are feeling better:     Hot cereal, plain toast, bread, rolls, crackers    Plain noodles, rice, mashed potatoes, chicken noodle or rice soup    Unsweetened canned fruit such as applesauce and bananas (not pineapple and citrus)    Limit fat intake to less than 15 grams per day. Don't eat margarine, butter, oils, mayonnaise, sauces, gravies, fried foods, peanut butter, meat, poultry, and fish.    Limit fiber. Don't eat raw or cooked vegetables, fresh fruits (except bananas), and bran cereals.    Limit caffeine and chocolate. No spices or seasonings except salt.  During the next 24 hours:    Slowly go back to a normal diet, as you feel better and your symptoms ease.    If at any time the diarrhea or cramping gets worse, go back to the simpler diet (above) or to clear liquids.  Follow-up care  Follow up with your healthcare provider, or as advised. Call if you aren't getting better in 24 hours or if the diarrhea lasts more than 1 week. This is even more important if you are in a high-risk group, such as:     Being an older adult    Having a weak immune system (such as from cancer treatment)    Having inflammatory bowel disease (Crohn's disease or colitis)    If a stool (diarrhea) sample was taken, you may call in 2 days (or as directed) for the results.   When to get medical advice  Call your healthcare provider right away if any of these occur:     More belly pain or constant lower right belly pain    Lasting vomiting (can't keep liquids down)    Frequent diarrhea (more than 5 times a day)    Blood in vomit or stool (black or red color)    Eating or drinking less    Dark urine, reduced urine output    Weakness, dizziness    Drowsiness    Fever of 100.4 F (38 C) or higher, or as directed by your  provider    New rash    Symptoms get worse or you have new symptoms  Call 911  Call 911 if any of these occur:     Trouble breathing    Feeling confused    Severe drowsiness or trouble waking up    Fainting or loss of consciousness    Fast heart rate    Seizure    Stiff neck  [employer] last reviewed this educational content on 2/1/2021 2000-2021 [employer]. All rights reserved. This information is not intended as a substitute for professional medical care. Always follow your healthcare professional's instructions.

## 2021-09-24 NOTE — TELEPHONE ENCOUNTER
Diarrhea stools x3 weeks     Was in [place] - assumed she got food poisoning     But persisted     Not every day but does 3-4 times when she does     Is pretty watery     Went to [facility] - saw PA, no fever, BP fine, stomach was fine, no pain. Stool tests are all negative     Could not do the C. Diff test because at the time was not having water stools     Scheduled to see PCP next Friday     Staying hydrated - discussed electrolyte drink     Energy is good     Taking a pro-biotic     Discussed BRAT diet, also recommended visit as diarrhea has been ongoing x3 weeks, scheduled with provider for today     Next 5 appointments (look out 90 days)    Sep 24, 2021  9:30 AM  Office Visit with Sapphire Recinos DO  [facility] [address], [address]  [facility] [address]  [phone]   Oct 01, 2021  7:30 AM  PHYSICAL with Carline Palm MD  [facility] ) [address], [address]  [facility] [address]  [phone]          Next 5 appointments (look out 90 days)    Oct 01, 2021  7:30 AM  PHYSICAL with Carline Palm MD  [facility] ) [address], [address]  [facility] [address]  [phone]          Reason for Disposition    MODERATE diarrhea (e.g., 4-6 times / day more than normal) and present > 48 hours (2 days)    Additional Information    Negative: Shock suspected (e.g., cold/pale/clammy skin, too weak to stand, low BP, rapid pulse)    Negative: Difficult to awaken or acting confused (e.g., disoriented, slurred speech)    Negative: Sounds like a life-threatening emergency to the triager    Negative: Vomiting also present and worse than the diarrhea    Negative: Blood in stool and without diarrhea    Negative: SEVERE abdominal pain (e.g., excruciating) and present > 1 hour    Negative: SEVERE abdominal pain and age > 60    Negative: Bloody,  black, or tarry bowel movements (Exception: chronic-unchanged black-grey bowel movements and is taking iron pills or Pepto-bismol)    Negative: SEVERE diarrhea (e.g., 7 or more times / day more than normal) and age > 60 years    Negative: Constant abdominal pain lasting > 2 hours    Negative: Drinking very little and has signs of dehydration (e.g., no urine > 12 hours, very dry mouth, very lightheaded)    Negative: Patient sounds very sick or weak to the triager    Negative: SEVERE diarrhea (e.g., 7 or more times / day more than normal) and present > 24 hours (1 day)    Protocols used: DIARRHEA-A-OH

## 2021-10-01 ENCOUNTER — OFFICE VISIT (OUTPATIENT)
Dept: FAMILY MEDICINE | Facility: CLINIC | Age: 71
End: 2021-10-01
Payer: COMMERCIAL

## 2021-10-01 VITALS
OXYGEN SATURATION: 99 % | HEART RATE: 64 BPM | BODY MASS INDEX: 21.52 KG/M2 | SYSTOLIC BLOOD PRESSURE: 139 MMHG | HEIGHT: 66 IN | TEMPERATURE: 98.3 F | WEIGHT: 133.9 LBS | DIASTOLIC BLOOD PRESSURE: 78 MMHG | RESPIRATION RATE: 16 BRPM

## 2021-10-01 DIAGNOSIS — I49.49 PREMATURE BEATS: ICD-10-CM

## 2021-10-01 DIAGNOSIS — Z00.00 ENCOUNTER FOR ANNUAL WELLNESS VISIT (AWV) IN MEDICARE PATIENT: Primary | ICD-10-CM

## 2021-10-01 DIAGNOSIS — M19.049 HAND ARTHRITIS: ICD-10-CM

## 2021-10-01 DIAGNOSIS — M89.9 DISORDER OF BONE AND CARTILAGE: ICD-10-CM

## 2021-10-01 DIAGNOSIS — M94.9 DISORDER OF BONE AND CARTILAGE: ICD-10-CM

## 2021-10-01 DIAGNOSIS — I25.10 MILD CAD: ICD-10-CM

## 2021-10-01 DIAGNOSIS — Z80.3 FAMILY HISTORY OF MALIGNANT NEOPLASM OF BREAST: ICD-10-CM

## 2021-10-01 DIAGNOSIS — E78.5 HYPERLIPIDEMIA LDL GOAL <70: ICD-10-CM

## 2021-10-01 LAB
ALBUMIN SERPL-MCNC: 3.6 G/DL (ref 3.4–5)
ALP SERPL-CCNC: 63 U/L (ref 40–150)
ALT SERPL W P-5'-P-CCNC: 26 U/L (ref 0–50)
ANION GAP SERPL CALCULATED.3IONS-SCNC: 7 MMOL/L (ref 3–14)
AST SERPL W P-5'-P-CCNC: 20 U/L (ref 0–45)
BILIRUB SERPL-MCNC: 0.6 MG/DL (ref 0.2–1.3)
BUN SERPL-MCNC: 10 MG/DL (ref 7–30)
CALCIUM SERPL-MCNC: 9.7 MG/DL (ref 8.5–10.1)
CHLORIDE BLD-SCNC: 108 MMOL/L (ref 94–109)
CHOLEST SERPL-MCNC: 166 MG/DL
CO2 SERPL-SCNC: 26 MMOL/L (ref 20–32)
CREAT SERPL-MCNC: 0.75 MG/DL (ref 0.52–1.04)
CRP SERPL-MCNC: <2.9 MG/L (ref 0–8)
ERYTHROCYTE [DISTWIDTH] IN BLOOD BY AUTOMATED COUNT: 12.6 % (ref 10–15)
FASTING STATUS PATIENT QL REPORTED: YES
GFR SERPL CREATININE-BSD FRML MDRD: 81 ML/MIN/1.73M2
GLUCOSE BLD-MCNC: 86 MG/DL (ref 70–99)
HCT VFR BLD AUTO: 39 % (ref 35–47)
HDLC SERPL-MCNC: 73 MG/DL
HGB BLD-MCNC: 12.8 G/DL (ref 11.7–15.7)
LDLC SERPL CALC-MCNC: 79 MG/DL
MCH RBC QN AUTO: 29.5 PG (ref 26.5–33)
MCHC RBC AUTO-ENTMCNC: 32.8 G/DL (ref 31.5–36.5)
MCV RBC AUTO: 90 FL (ref 78–100)
NONHDLC SERPL-MCNC: 93 MG/DL
PLATELET # BLD AUTO: 282 10E3/UL (ref 150–450)
POTASSIUM BLD-SCNC: 3.8 MMOL/L (ref 3.4–5.3)
PROT SERPL-MCNC: 7 G/DL (ref 6.8–8.8)
RBC # BLD AUTO: 4.34 10E6/UL (ref 3.8–5.2)
SODIUM SERPL-SCNC: 141 MMOL/L (ref 133–144)
TRIGL SERPL-MCNC: 70 MG/DL
TSH SERPL DL<=0.005 MIU/L-ACNC: 0.9 MU/L (ref 0.4–4)
WBC # BLD AUTO: 7.1 10E3/UL (ref 4–11)

## 2021-10-01 PROCEDURE — G0439 PPPS, SUBSEQ VISIT: HCPCS | Performed by: INTERNAL MEDICINE

## 2021-10-01 PROCEDURE — 85027 COMPLETE CBC AUTOMATED: CPT | Performed by: INTERNAL MEDICINE

## 2021-10-01 PROCEDURE — 86140 C-REACTIVE PROTEIN: CPT | Performed by: INTERNAL MEDICINE

## 2021-10-01 PROCEDURE — 80061 LIPID PANEL: CPT | Performed by: INTERNAL MEDICINE

## 2021-10-01 PROCEDURE — 84443 ASSAY THYROID STIM HORMONE: CPT | Performed by: INTERNAL MEDICINE

## 2021-10-01 PROCEDURE — 80053 COMPREHEN METABOLIC PANEL: CPT | Performed by: INTERNAL MEDICINE

## 2021-10-01 PROCEDURE — 99214 OFFICE O/P EST MOD 30 MIN: CPT | Mod: 25 | Performed by: INTERNAL MEDICINE

## 2021-10-01 PROCEDURE — 36415 COLL VENOUS BLD VENIPUNCTURE: CPT | Performed by: INTERNAL MEDICINE

## 2021-10-01 RX ORDER — METOPROLOL TARTRATE 25 MG/1
25 TABLET, FILM COATED ORAL 2 TIMES DAILY
Qty: 180 TABLET | Refills: 3 | Status: SHIPPED | OUTPATIENT
Start: 2021-10-01 | End: 2022-06-02

## 2021-10-01 RX ORDER — ATORVASTATIN CALCIUM 20 MG/1
20 TABLET, FILM COATED ORAL DAILY
Qty: 90 TABLET | Refills: 3 | Status: SHIPPED | OUTPATIENT
Start: 2021-10-01 | End: 2022-11-08

## 2021-10-01 ASSESSMENT — ENCOUNTER SYMPTOMS
COUGH: 0
WEAKNESS: 0
EYE PAIN: 0
SORE THROAT: 0
ABDOMINAL PAIN: 0
ARTHRALGIAS: 1
CHILLS: 0
FEVER: 0
PARESTHESIAS: 0
DYSURIA: 0
FREQUENCY: 0
NERVOUS/ANXIOUS: 0
HEARTBURN: 0
MYALGIAS: 0
CONSTIPATION: 0
PALPITATIONS: 0
BREAST MASS: 0
DIARRHEA: 0
SHORTNESS OF BREATH: 0
NAUSEA: 0
DIZZINESS: 0
HEMATURIA: 0
JOINT SWELLING: 0
HEMATOCHEZIA: 0

## 2021-10-01 ASSESSMENT — ACTIVITIES OF DAILY LIVING (ADL): CURRENT_FUNCTION: NO ASSISTANCE NEEDED

## 2021-10-01 ASSESSMENT — MIFFLIN-ST. JEOR: SCORE: 1140.15

## 2021-10-01 NOTE — PROGRESS NOTES
"SUBJECTIVE:   Amaya Huff is a 71 year old female who presents for Preventive Visit.    This extremely nice 71 years old lady has mild coronary artery disease and is quite physically active  Angina is minimal when she tries to exercise in the cold weather  Her hands are becoming deformed  And one finger in the right hand the index finger is becoming more deviated  Patient has been advised of split billing requirements and indicates understanding: Yes   Are you in the first 12 months of your Medicare coverage?  No    Healthy Habits:     In general, how would you rate your overall health?  Excellent    Frequency of exercise:  4-5 days/week    Duration of exercise:  45-60 minutes    Do you usually eat at least 4 servings of fruit and vegetables a day, include whole grains    & fiber and avoid regularly eating high fat or \"junk\" foods?  Yes    Taking medications regularly:  No    Medication side effects:  None    Ability to successfully perform activities of daily living:  No assistance needed    Home Safety:  No safety concerns identified    Hearing Impairment:  No hearing concerns    In the past 6 months, have you been bothered by leaking of urine?  No    In general, how would you rate your overall mental or emotional health?  Excellent      PHQ-2 Total Score: 0    Additional concerns today:  No    Do you feel safe in your environment? Yes    Have you ever done Advance Care Planning? (For example, a Health Directive, POLST, or a discussion with a medical provider or your loved ones about your wishes): Yes, patient states has an Advance Care Planning document and will bring a copy to the clinic.       Fall risk  Fallen 2 or more times in the past year?: No  Any fall with injury in the past year?: No    Cognitive Screening   1) Repeat 3 items (Leader, Season, Table)    2) Clock draw: NORMAL  3) 3 item recall: Recalls 3 objects  Results: 3 items recalled: COGNITIVE IMPAIRMENT LESS LIKELY    Mini-CogTM Copyright S " Debi. Licensed by the author for use in [facility]; reprinted with permission (zuleyma@[facility]). All rights reserved.          Reviewed and updated as needed this visit by clinical staff  Tobacco  Allergies  Meds  Problems  Med Hx  Surg Hx  Fam Hx          Reviewed and updated as needed this visit by Provider    Meds  Problems  Med Hx  Surg Hx  Fam Hx         Social History     Tobacco Use     Smoking status: Never Smoker     Smokeless tobacco: Never Used   Substance Use Topics     Alcohol use: Yes     Alcohol/week: 0.0 standard drinks     Comment: 1 glass wine/day         Alcohol Use 10/1/2021   Prescreen: >3 drinks/day or >7 drinks/week? No   Prescreen: >3 drinks/day or >7 drinks/week? -               Current providers sharing in care for this patient include:   Patient Care Team:  Carline Palm MD as PCP - General (Internal Medicine)  Josh Simental MD as Assigned Heart and Vascular Provider  Sapphire Recinos DO as Assigned PCP    The following health maintenance items are reviewed in Epic and correct as of today:  Health Maintenance Due   Topic Date Due     ANNUAL REVIEW OF  ORDERS  Never done     Pneumococcal Vaccine: 65+ Years (2 of 2 - PPSV23) 12/22/2019     FALL RISK ASSESSMENT  07/06/2021     LIPID  07/07/2021     INFLUENZA VACCINE (1) 09/01/2021     Patient Active Problem List   Diagnosis     Abdominal pain     Premature beats     Mitral valve disorder     Advanced directives, counseling/discussion     Mild CAD     SBO (small bowel obstruction) (H)     Family history of malignant neoplasm of breast     Disorder of bone and cartilage     Past Surgical History:   Procedure Laterality Date     COLONOSCOPY  2/27/2012    Procedure:COLONOSCOPY; COLONOSCOPY; Surgeon:GINGER PARKS; Location: GI     COLONOSCOPY N/A 6/30/2017    Procedure: COLONOSCOPY;  colonoscopy;  Surgeon: Dez Siegel MD;  Location:  GI     CORONARY ANGIOGRAPHY ADULT ORDER  3/18/14    3/18/14-  "Mild to moderate CAD, no interventions, treat medically.     ENT SURGERY  tonsils     GYN SURGERY  c sections     [facility] NONSPECIFIC PROCEDURE      S/P T&A     [facility] NONSPECIFIC PROCEDURE      LASER FIBROIDS     [facility] NONSPECIFIC PROCEDURE       x2       Social History     Tobacco Use     Smoking status: Never Smoker     Smokeless tobacco: Never Used   Substance Use Topics     Alcohol use: Yes     Alcohol/week: 0.0 standard drinks     Comment: 1 glass wine/day     Family History   Problem Relation Age of Onset     C.A.D. Mother      Parkinsonism Mother 80     Breast Cancer Mother 80     Circulatory Maternal Grandmother         MVP     Alcohol/Drug Daughter 30     Cerebrovascular Disease Son 40        stroke, PFO     Circulatory Maternal Aunt         MVP     Breast Cancer Maternal Aunt 80         Current Outpatient Medications   Medication Sig Dispense Refill     aspirin 81 MG tablet Take 81 mg by mouth daily       atorvastatin (LIPITOR) 20 MG tablet Take 1 tablet (20 mg) by mouth daily 90 tablet 3     Calcium Carbonate-Vitamin D (CALCIUM + D PO) Take 1 chew tab by mouth 2 times daily 1300 mg daily  (650 each)       diclofenac (VOLTAREN) 1 % topical gel Apply 2 g topically 4 times daily 100 g 3     Fish Oil-Cholecalciferol (OMEGA-3 FISH OIL/VITAMIN D3) 7834-3182 MG-UNIT CAPS        metoprolol tartrate (LOPRESSOR) 25 MG tablet Take 1 tablet (25 mg) by mouth 2 times daily 180 tablet 3     NONFORMULARY nutrafol       tretinoin (RETIN-A) 0.025 % external cream APPLY TO FACE EVERY DAY AT BEDTIME       fluorouracil 5 % SOLN Apply to facial spots on Saturday &  (Patient not taking: Reported on 2021)       triamcinolone (KENALOG) 0.1 % external cream Apply topically every other day (Patient not taking: Reported on 2021)       Allergies   Allergen Reactions     Penicillins      \"severe migraines\"             Review of Systems  10 point ROS of systems including Constitutional, Eyes, Respiratory, " "Cardiovascular, Gastroenterology, Genitourinary, Integumentary, Muscularskeletal, Psychiatric were all negative except for pertinent positives noted in my HPI.      OBJECTIVE:   /78 (BP Location: Left arm, Cuff Size: Adult Regular)   Pulse 64   Temp 98.3  F (36.8  C) (Tympanic)   Resp 16   Ht 1.67 m (5' 5.75\")   Wt 60.7 kg (133 lb 14.4 oz)   SpO2 99%   BMI 21.78 kg/m   Estimated body mass index is 21.78 kg/m  as calculated from the following:    Height as of this encounter: 1.67 m (5' 5.75\").    Weight as of this encounter: 60.7 kg (133 lb 14.4 oz).  Physical Exam  GENERAL APPEARANCE: healthy, alert and no distress  EYES: Eyes grossly normal to inspection, PERRL and conjunctivae and sclerae normal  HENT: ear canals and TM's normal, nose and mouth without ulcers or lesions, oropharynx clear and oral mucous membranes moist  NECK: no adenopathy, no asymmetry, masses, or scars and thyroid normal to palpation  RESP: lungs clear to auscultation - no rales, rhonchi or wheezes  BREAST: normal without masses, tenderness or nipple discharge and no palpable axillary masses or adenopathy  CV: regular rate and rhythm, normal S1 S2, no S3 or S4, no murmur, click or rub, no peripheral edema and peripheral pulses strong  ABDOMEN: soft, nontender, no hepatosplenomegaly, no masses and bowel sounds normal  MS: no musculoskeletal defects are noted, gait is age appropriate without ataxia  Heberden's nodes in both hands with right index finger DIP shows deformity  SKIN: Some benign lesions with sun damage no suspicious lesions or rashes  NEURO: Normal strength and tone, sensory exam grossly normal, mentation intact and speech normal  PSYCH: mentation appears normal and affect normal/bright    ASSESSMENT / PLAN:   Amaya was seen today for physical.    Diagnoses and all orders for this visit:    Encounter for annual wellness visit (AWV) in Medicare patient  71 years old looks much younger than her stated age and takes good " "care of herself  She recently had diarrhea from her travel which has resolved  She will take the Covid vaccine and flu shot in the pharmacy on Wednesday  She is up-to-date on mammogram and colonoscopy  Mammogram in March and DEXA scan also this year in June  Hand arthritis  Patient should see orthopedics  -     Orthopedic  Referral; Future  -     diclofenac (VOLTAREN) 1 % topical gel; Apply 2 g topically 4 times daily  -     TSH with free T4 reflex; Future  -     CBC with Platelets  ; Future  -     CRP, inflammation; Future    Mild CAD  -     CBC with Platelets  ; Future  -     metoprolol tartrate (LOPRESSOR) 25 MG tablet; Take 1 tablet (25 mg) by mouth 2 times daily  Angiography was done 7 years ago and did show mild and diffuse disease in LAD and RCA  She will continue aspirin and statins  Hyperlipidemia LDL goal <70  -     Comprehensive metabolic panel; Future  -     Lipid panel reflex to direct LDL Fasting; Future  -     TSH with free T4 reflex; Future  -     atorvastatin (LIPITOR) 20 MG tablet; Take 1 tablet (20 mg) by mouth daily  As above  Premature beats  Occasional symptoms on metoprolol  Disorder of bone and cartilage  -1.7 at lumbar bone density  Family history of malignant neoplasm of breast  Patient's mother and maternal aunt had breast cancer in their 80s and mammogram will be done routinely    COUNSELING:  Reviewed preventive health counseling, as reflected in patient instructions       Vaccinations as above    Estimated body mass index is 21.78 kg/m  as calculated from the following:    Height as of this encounter: 1.67 m (5' 5.75\").    Weight as of this encounter: 60.7 kg (133 lb 14.4 oz).        She reports that she has never smoked. She has never used smokeless tobacco.      Appropriate preventive services were discussed with this patient, including applicable screening as appropriate for cardiovascular disease, diabetes, osteopenia/osteoporosis, and glaucoma.  As appropriate for " age/gender, discussed screening for colorectal cancer, , breast cancer, and cervical cancer. Checklist reviewing preventive services available has been given to the patient.    Reviewed patients plan of care and provided an AVS. The Intermediate Care Plan ( asthma action plan, low back pain action plan, and migraine action plan) for Amaya meets the Care Plan requirement. This Care Plan has been established and reviewed with the Patient.    Counseling Resources:  ATP IV Guidelines  Pooled Cohorts Equation Calculator  Breast Cancer Risk Calculator  Breast Cancer: Medication to Reduce Risk  FRAX Risk Assessment  ICSI Preventive Guidelines  Dietary Guidelines for Americans, 2010  USDA's MyPlate  ASA Prophylaxis  Lung CA Screening    Carline Palm MD  [facility]    Identified Health Risks:

## 2021-10-24 ENCOUNTER — HEALTH MAINTENANCE LETTER (OUTPATIENT)
Age: 71
End: 2021-10-24

## 2021-12-16 ENCOUNTER — OFFICE VISIT (OUTPATIENT)
Dept: FAMILY MEDICINE | Facility: CLINIC | Age: 71
End: 2021-12-16
Payer: COMMERCIAL

## 2021-12-16 VITALS
WEIGHT: 131 LBS | HEIGHT: 66 IN | SYSTOLIC BLOOD PRESSURE: 146 MMHG | OXYGEN SATURATION: 100 % | BODY MASS INDEX: 21.05 KG/M2 | RESPIRATION RATE: 16 BRPM | DIASTOLIC BLOOD PRESSURE: 88 MMHG | HEART RATE: 76 BPM | TEMPERATURE: 97.1 F

## 2021-12-16 DIAGNOSIS — R39.89 URINARY PROBLEM: ICD-10-CM

## 2021-12-16 DIAGNOSIS — N30.00 ACUTE CYSTITIS WITHOUT HEMATURIA: Primary | ICD-10-CM

## 2021-12-16 DIAGNOSIS — N20.0 KIDNEY STONE: ICD-10-CM

## 2021-12-16 LAB
ALBUMIN UR-MCNC: 30 MG/DL
APPEARANCE UR: ABNORMAL
BACTERIA #/AREA URNS HPF: ABNORMAL /HPF
BILIRUB UR QL STRIP: NEGATIVE
COLOR UR AUTO: YELLOW
GLUCOSE UR STRIP-MCNC: NEGATIVE MG/DL
HGB UR QL STRIP: ABNORMAL
KETONES UR STRIP-MCNC: NEGATIVE MG/DL
LEUKOCYTE ESTERASE UR QL STRIP: ABNORMAL
NITRATE UR QL: NEGATIVE
PH UR STRIP: 7 [PH] (ref 5–7)
RBC #/AREA URNS AUTO: >100 /HPF
SP GR UR STRIP: 1.01 (ref 1–1.03)
UROBILINOGEN UR STRIP-ACNC: 0.2 E.U./DL
WBC #/AREA URNS AUTO: >100 /HPF

## 2021-12-16 PROCEDURE — 81001 URINALYSIS AUTO W/SCOPE: CPT | Performed by: INTERNAL MEDICINE

## 2021-12-16 PROCEDURE — 99214 OFFICE O/P EST MOD 30 MIN: CPT | Performed by: INTERNAL MEDICINE

## 2021-12-16 PROCEDURE — 87186 SC STD MICRODIL/AGAR DIL: CPT | Performed by: INTERNAL MEDICINE

## 2021-12-16 PROCEDURE — 87086 URINE CULTURE/COLONY COUNT: CPT | Performed by: INTERNAL MEDICINE

## 2021-12-16 RX ORDER — CIPROFLOXACIN 500 MG/1
500 TABLET, FILM COATED ORAL 2 TIMES DAILY
Qty: 14 TABLET | Refills: 0 | Status: SHIPPED | OUTPATIENT
Start: 2021-12-16 | End: 2021-12-23

## 2021-12-16 RX ORDER — CIPROFLOXACIN 500 MG/1
500 TABLET, FILM COATED ORAL 2 TIMES DAILY
Qty: 10 TABLET | Refills: 0 | Status: SHIPPED | OUTPATIENT
Start: 2021-12-16 | End: 2021-12-16

## 2021-12-16 ASSESSMENT — MIFFLIN-ST. JEOR: SCORE: 1126.99

## 2021-12-16 NOTE — PROGRESS NOTES
"  Assessment & Plan     Acute cystitis without hematuria  Complicated cystitis due to the presence of flank pain and right CVA tenderness. Ciprofloxacin 500 mg bid for 7 days.  - ciprofloxacin (CIPRO) 500 MG tablet; Take 1 tablet (500 mg) by mouth 2 times daily for 7 days    Urinary problem  - UA Macro with Reflex to Micro and Culture - lab collect  - Urine Microscopic Exam  - Urine Culture    Kidney Stone:  UA shows large amount of blood. Patient has hx of kidney stones. Will repeat UA in 1 month. She will follow up with urologist       No follow-ups on file.    MARTHA SANON MD  [facility]    Subjective   Amaya is a 71 year old who presents for the following health issues     HPI   Amaya is a 71 year old pleasant lady who presented to the clinic with concerns. About urinary symptoms.  She has  Increased urinary frequency, dysuria, abdominal and flank pain, which started Monday night.   UA is positive for LE and bacteria.   She has hx of kidney stones.     Review of Systems         Objective    BP (!) 146/88 (BP Location: Right arm, Patient Position: Sitting, Cuff Size: Adult Regular)   Pulse 76   Temp 97.1  F (36.2  C) (Temporal)   Resp 16   Ht 1.67 m (5' 5.75\")   Wt 59.4 kg (131 lb)   SpO2 100%   BMI 21.31 kg/m    Body mass index is 21.31 kg/m .  Physical Exam  Abdominal:      Tenderness: There is right CVA tenderness. There is no left CVA tenderness.         "

## 2021-12-19 LAB — BACTERIA UR CULT: ABNORMAL

## 2021-12-23 ENCOUNTER — TELEPHONE (OUTPATIENT)
Dept: FAMILY MEDICINE | Facility: CLINIC | Age: 71
End: 2021-12-23
Payer: COMMERCIAL

## 2021-12-23 NOTE — TELEPHONE ENCOUNTER
Patient called in because she got a notification from her pharmacy that she had an antibiotic ready.    She picked up cipro 1 tab 2x day fro 5 days on 2/16 and this was discontinued and reordered after she had already picked up script for a 7 day supply. Patient only took for 5 days. She is no longer having UTI symptoms.she is wondering if she needs to restart the cipro and take for 7 days? Or if the 5 days was enough since her symptoms have resolved, please advise    Cas Romeo RN

## 2021-12-24 NOTE — TELEPHONE ENCOUNTER
Ideally for 7 days total. I had first given 5 days then changed to 7 days. If she can add 2 more days of cipro that will be fine.

## 2022-01-18 ENCOUNTER — LAB (OUTPATIENT)
Dept: LAB | Facility: CLINIC | Age: 72
End: 2022-01-18
Payer: MEDICARE

## 2022-01-18 DIAGNOSIS — N20.0 KIDNEY STONE: ICD-10-CM

## 2022-01-18 LAB
ALBUMIN UR-MCNC: NEGATIVE MG/DL
APPEARANCE UR: CLEAR
BACTERIA #/AREA URNS HPF: NORMAL /HPF
BILIRUB UR QL STRIP: NEGATIVE
COLOR UR AUTO: YELLOW
GLUCOSE UR STRIP-MCNC: NEGATIVE MG/DL
HGB UR QL STRIP: NEGATIVE
KETONES UR STRIP-MCNC: NEGATIVE MG/DL
LEUKOCYTE ESTERASE UR QL STRIP: NEGATIVE
NITRATE UR QL: NEGATIVE
PH UR STRIP: 6.5 [PH] (ref 5–7)
RBC #/AREA URNS AUTO: NORMAL /HPF
SP GR UR STRIP: 1.02 (ref 1–1.03)
UROBILINOGEN UR STRIP-ACNC: 0.2 E.U./DL
WBC #/AREA URNS AUTO: NORMAL /HPF

## 2022-01-18 PROCEDURE — 81001 URINALYSIS AUTO W/SCOPE: CPT

## 2022-02-14 NOTE — PROGRESS NOTES
Orthopaedic Surgery Hand and Upper Extremity Clinic H&P NOTE:  2/18/2022     Patient Name: Amaya Huff  MRN: [id number]    CHIEF COMPLAINT: hand arthritis    Occupation: retired teacher    Dear Dr. Palm,    Thank you for your kind referral of Amaya to the orthopedic hand surgery clinic today for consult.  As you know she is a 71-year-old left-hand-dominant female who presents with a osteoarthritic change of the right hand.  Please allow me to review her history for our own records.    Ms. Amaya Huff is a 71 year old female left hand dominant who presents with right hand pain in her index, middle and ring fingers. She states that she broke those fingers somewhere in the phalanges 14 years ago while breaking firewood, treated nonoperatively. She recovered from that. She now reports progressive pain, stiffness, and deformity. She states the pain is achy and she has trouble making a fist. Achy pain worst in the ring finger, which she states is constant.  Pain is intermittent and gets worse when using her hand. Rest makes it better. Denies any treatments.     She has developed a mucous cyst on the ulnar aspect of the right middle finger DIP joint as well. Intermittently painful and fluctuates in size. Present for about a year    Also developed progress ulnar deviation and deformity of the index finger DIP joint. Not particularly painful.    The patient denies pain elsewhere in the symptomatic upper extremity.      PAST MEDICAL HISTORY:  Past Medical History:   Diagnosis Date     Coronary artery disease     Mild to moderate CAD noted on 3/18/14 coronary angiogram      Heart palpitations      History of angina      Hyperlipidemia with target LDL less than 130 3/24/2015     Diagnosis updated by automated process. Provider to review and confirm.     Irregular heartbeat      Mitral valve disorders(424.0)      Unspecified essential hypertension 1995       PAST SURGICAL HISTORY:  Past Surgical History:  "  Procedure Laterality Date     COLONOSCOPY  2012    Procedure:COLONOSCOPY; COLONOSCOPY; Surgeon:GINGER PARKS; Location: GI     COLONOSCOPY N/A 2017    Procedure: COLONOSCOPY;  colonoscopy;  Surgeon: Dez Siegel MD;  Location:  GI     CORONARY ANGIOGRAPHY ADULT ORDER  3/18/14    3/18/14- Mild to moderate CAD, no interventions, treat medically.     ENT SURGERY  tonsils     GYN SURGERY  c sections     [facility] NONSPECIFIC PROCEDURE      S/P T&A     [facility] NONSPECIFIC PROCEDURE      LASER FIBROIDS     [facility] NONSPECIFIC PROCEDURE       x2       MEDICATIONS:  Current Outpatient Medications   Medication     aspirin 81 MG tablet     atorvastatin (LIPITOR) 20 MG tablet     Calcium Carbonate-Vitamin D (CALCIUM + D PO)     diclofenac (VOLTAREN) 1 % topical gel     Fish Oil-Cholecalciferol (OMEGA-3 FISH OIL/VITAMIN D3) 7918-8451 MG-UNIT CAPS     fluorouracil 5 % SOLN     metoprolol tartrate (LOPRESSOR) 25 MG tablet     NONFORMULARY     tretinoin (RETIN-A) 0.025 % external cream     triamcinolone (KENALOG) 0.1 % external cream     No current facility-administered medications for this visit.       ALLERGIES:     Allergies   Allergen Reactions     Penicillins      \"severe migraines\"       FAMILY HISTORY:  No pertinent family history    SOCIAL HISTORY:  Social History     Tobacco Use     Smoking status: Never Smoker     Smokeless tobacco: Never Used   Substance Use Topics     Alcohol use: Yes     Alcohol/week: 0.0 standard drinks     Comment: 1 glass wine/day     Drug use: No             The patient's past medical, family, and social history was reviewed and confirmed.    REVIEW OF SYMPTOMS:      General: Negative   Eyes: Negative   Ear, Nose and Throat: Negative   Respiratory: Negative   Cardiovascular: Negative   Gastrointestinal: Negative   Genito-urinary: Negative   Musculoskeletal: Negative  Neurological: Negative   Psychological: Negative  HEME: Negative   ENDO: Negative   SKIN: " Negative    VITALS:  There were no vitals filed for this visit.    EXAM:  General: NAD, A&Ox3  HEENT: NC/AT  CV: RRR by peripheral pulse  Pulmonary: Non-labored breathing on RA  RUE:  Ulnar deviation and enlargement of the index finger DIP joint  Mucous cyst ulnar aspect of the middle finger DIP joint  Unable to fully flex the ring finger, DIP stiffness to about 70 deg flexion  Mild TTP ring finer PIP joint  No intrinsic tightness or pain in the web space  Intact EDC/EIP/FDP/FDS/FPL/EPL/HI  SILT m/r/u  WWP CR< 2s    IMAGING:  XRs of the right hand obtained today reviewed by me demonstrates osteoarthritic degenerative change of the right index finger.  Degenerative change of the right middle finger DIP joint with dorsal osteophyte.  Joint space narrowing of the ring finger PIP joint    IMPRESSION AND RECOMMENDATIONS:  Ms. Amaya Huff is a 71 year old year old female left hand dominant with right hand osteoarthritis.    Patient states that the mucous cyst of the middle finger is bothersome and would like to have it excised.  She would like to have this done sometime in April when she returns from a trip to [facility].  Wants to have it done under local anesthesia only    Regarding her right index finger deformity, I discussed that if it is painful or bothersome that we can fuse it straight.  She would like to hold off on that at this time.    Otherwise I recommended anti-inflammatories and continued range of motion to prevent stiffness.    The patient voiced understand agreement all her questions were answered to her satisfaction.    Again thank you for your kind referral of Amaya to the hand surgery clinic today.  Please do not hesitate to contact me with any questions regarding her care    Jose Corado MD    Hand, Upper Extremity & Microvascular Surgery  Department of Orthopaedic Surgery  [facility]

## 2022-02-18 ENCOUNTER — OFFICE VISIT (OUTPATIENT)
Dept: ORTHOPEDICS | Facility: CLINIC | Age: 72
End: 2022-02-18
Payer: MEDICARE

## 2022-02-18 ENCOUNTER — ANCILLARY PROCEDURE (OUTPATIENT)
Dept: GENERAL RADIOLOGY | Facility: CLINIC | Age: 72
End: 2022-02-18
Attending: STUDENT IN AN ORGANIZED HEALTH CARE EDUCATION/TRAINING PROGRAM
Payer: MEDICARE

## 2022-02-18 VITALS — WEIGHT: 131 LBS | DIASTOLIC BLOOD PRESSURE: 78 MMHG | SYSTOLIC BLOOD PRESSURE: 138 MMHG | BODY MASS INDEX: 21.31 KG/M2

## 2022-02-18 DIAGNOSIS — M79.642 BILATERAL HAND PAIN: ICD-10-CM

## 2022-02-18 DIAGNOSIS — M79.642 BILATERAL HAND PAIN: Primary | ICD-10-CM

## 2022-02-18 DIAGNOSIS — M79.641 BILATERAL HAND PAIN: ICD-10-CM

## 2022-02-18 DIAGNOSIS — M79.641 BILATERAL HAND PAIN: Primary | ICD-10-CM

## 2022-02-18 DIAGNOSIS — M67.449 MUCOUS CYST OF DIGIT OF HAND: ICD-10-CM

## 2022-02-18 PROCEDURE — 73130 X-RAY EXAM OF HAND: CPT | Mod: RT | Performed by: RADIOLOGY

## 2022-02-18 PROCEDURE — 99203 OFFICE O/P NEW LOW 30 MIN: CPT | Performed by: STUDENT IN AN ORGANIZED HEALTH CARE EDUCATION/TRAINING PROGRAM

## 2022-02-18 NOTE — LETTER
2/18/2022         RE: Amaya Huff  [address] [facility] [place] [address]        Dear Colleague,    Thank you for referring your patient, Amaya Huff, to the [facility] [place]. Please see a copy of my visit note below.    Orthopaedic Surgery Hand and Upper Extremity Clinic H&P NOTE:  2/18/2022     Patient Name: Amaya Huff  MRN: [id number]    CHIEF COMPLAINT: hand arthritis    Occupation: retired teacher    Dear Dr. Palm,    Thank you for your kind referral of Amaya to the orthopedic hand surgery clinic today for consult.  As you know she is a 71-year-old left-hand-dominant female who presents with a osteoarthritic change of the right hand.  Please allow me to review her history for our own records.    Ms. Amaya Huff is a 71 year old female left hand dominant who presents with right hand pain in her index, middle and ring fingers. She states that she broke those fingers somewhere in the phalanges 14 years ago while breaking firewood, treated nonoperatively. She recovered from that. She now reports progressive pain, stiffness, and deformity. She states the pain is achy and she has trouble making a fist. Achy pain worst in the ring finger, which she states is constant.  Pain is intermittent and gets worse when using her hand. Rest makes it better. Denies any treatments.     She has developed a mucous cyst on the ulnar aspect of the right middle finger DIP joint as well. Intermittently painful and fluctuates in size. Present for about a year    Also developed progress ulnar deviation and deformity of the index finger DIP joint. Not particularly painful.    The patient denies pain elsewhere in the symptomatic upper extremity.      PAST MEDICAL HISTORY:  Past Medical History:   Diagnosis Date     Coronary artery disease     Mild to moderate CAD noted on 3/18/14 coronary angiogram      Heart palpitations      History of angina      Hyperlipidemia with  "target LDL less than 130 3/24/2015     Diagnosis updated by automated process. Provider to review and confirm.     Irregular heartbeat      Mitral valve disorders(424.0)      Unspecified essential hypertension        PAST SURGICAL HISTORY:  Past Surgical History:   Procedure Laterality Date     COLONOSCOPY  2012    Procedure:COLONOSCOPY; COLONOSCOPY; Surgeon:GINGER PARKS; Location: GI     COLONOSCOPY N/A 2017    Procedure: COLONOSCOPY;  colonoscopy;  Surgeon: Dez Siegel MD;  Location:  GI     CORONARY ANGIOGRAPHY ADULT ORDER  3/18/14    3/18/14- Mild to moderate CAD, no interventions, treat medically.     ENT SURGERY  tonsils     GYN SURGERY  c sections     [facility] NONSPECIFIC PROCEDURE      S/P T&A     [facility] NONSPECIFIC PROCEDURE      LASER FIBROIDS     [facility] NONSPECIFIC PROCEDURE       x2       MEDICATIONS:  Current Outpatient Medications   Medication     aspirin 81 MG tablet     atorvastatin (LIPITOR) 20 MG tablet     Calcium Carbonate-Vitamin D (CALCIUM + D PO)     diclofenac (VOLTAREN) 1 % topical gel     Fish Oil-Cholecalciferol (OMEGA-3 FISH OIL/VITAMIN D3) 8399-6548 MG-UNIT CAPS     fluorouracil 5 % SOLN     metoprolol tartrate (LOPRESSOR) 25 MG tablet     NONFORMULARY     tretinoin (RETIN-A) 0.025 % external cream     triamcinolone (KENALOG) 0.1 % external cream     No current facility-administered medications for this visit.       ALLERGIES:     Allergies   Allergen Reactions     Penicillins      \"severe migraines\"       FAMILY HISTORY:  No pertinent family history    SOCIAL HISTORY:  Social History     Tobacco Use     Smoking status: Never Smoker     Smokeless tobacco: Never Used   Substance Use Topics     Alcohol use: Yes     Alcohol/week: 0.0 standard drinks     Comment: 1 glass wine/day     Drug use: No             The patient's past medical, family, and social history was reviewed and confirmed.    REVIEW OF SYMPTOMS:      General: Negative   Eyes: Negative "   Ear, Nose and Throat: Negative   Respiratory: Negative   Cardiovascular: Negative   Gastrointestinal: Negative   Genito-urinary: Negative   Musculoskeletal: Negative  Neurological: Negative   Psychological: Negative  HEME: Negative   ENDO: Negative   SKIN: Negative    VITALS:  There were no vitals filed for this visit.    EXAM:  General: NAD, A&Ox3  HEENT: NC/AT  CV: RRR by peripheral pulse  Pulmonary: Non-labored breathing on RA  RUE:  Ulnar deviation and enlargement of the index finger DIP joint  Mucous cyst ulnar aspect of the middle finger DIP joint  Unable to fully flex the ring finger, DIP stiffness to about 70 deg flexion  Mild TTP ring finer PIP joint  No intrinsic tightness or pain in the web space  Intact EDC/EIP/FDP/FDS/FPL/EPL/HI  SILT m/r/u  WWP CR< 2s    IMAGING:  XRs of the right hand obtained today reviewed by me demonstrates osteoarthritic degenerative change of the right index finger.  Degenerative change of the right middle finger DIP joint with dorsal osteophyte.  Joint space narrowing of the ring finger PIP joint    IMPRESSION AND RECOMMENDATIONS:  Ms. Amaya Huff is a 71 year old year old female left hand dominant with right hand osteoarthritis.    Patient states that the mucous cyst of the middle finger is bothersome and would like to have it excised.  She would like to have this done sometime in April when she returns from a trip to [facility].  Wants to have it done under local anesthesia only    Regarding her right index finger deformity, I discussed that if it is painful or bothersome that we can fuse it straight.  She would like to hold off on that at this time.    Otherwise I recommended anti-inflammatories and continued range of motion to prevent stiffness.    The patient voiced understand agreement all her questions were answered to her satisfaction.    Again thank you for your kind referral of Amaya to the hand surgery clinic today.  Please do not hesitate to contact me with any  questions regarding her care    Jose Corado MD    Hand, Upper Extremity & Microvascular Surgery  Department of Orthopaedic Surgery  [facility]          Again, thank you for allowing me to participate in the care of your patient.        Sincerely,        Jose Corado MD

## 2022-02-18 NOTE — PATIENT INSTRUCTIONS
Thank you for choosing [facility]    Dr. Corado Locations:    [facility] [address], [address]  [address] [address]   Appointments: [phone] Appointments: [phone]   Fax: [phone] Fax: [phone]     Follow up: after surgery.     Please call Surgical Scheduling at [phone] to schedule your surgery appointment.

## 2022-03-07 ENCOUNTER — TRANSFERRED RECORDS (OUTPATIENT)
Dept: HEALTH INFORMATION MANAGEMENT | Facility: CLINIC | Age: 72
End: 2022-03-07
Payer: MEDICARE

## 2022-03-21 ENCOUNTER — TELEPHONE (OUTPATIENT)
Dept: ORTHOPEDICS | Facility: CLINIC | Age: 72
End: 2022-03-21
Payer: MEDICARE

## 2022-03-21 NOTE — TELEPHONE ENCOUNTER
Patient had wanted an April surgery date.  Dates are filling fast. Decided to follow up with patient.  Left voicemail. Awaiting callback.     Abdoulaye

## 2022-04-18 ENCOUNTER — TRANSFERRED RECORDS (OUTPATIENT)
Dept: HEALTH INFORMATION MANAGEMENT | Facility: CLINIC | Age: 72
End: 2022-04-18

## 2022-04-18 ENCOUNTER — OFFICE VISIT (OUTPATIENT)
Dept: FAMILY MEDICINE | Facility: CLINIC | Age: 72
End: 2022-04-18
Payer: COMMERCIAL

## 2022-04-18 VITALS
HEART RATE: 69 BPM | HEIGHT: 66 IN | RESPIRATION RATE: 14 BRPM | OXYGEN SATURATION: 100 % | SYSTOLIC BLOOD PRESSURE: 145 MMHG | TEMPERATURE: 98.4 F | WEIGHT: 138.4 LBS | DIASTOLIC BLOOD PRESSURE: 72 MMHG | BODY MASS INDEX: 22.24 KG/M2

## 2022-04-18 DIAGNOSIS — M19.041 OSTEOARTHRITIS OF RIGHT HAND, UNSPECIFIED OSTEOARTHRITIS TYPE: ICD-10-CM

## 2022-04-18 DIAGNOSIS — Z01.818 PREOP GENERAL PHYSICAL EXAM: Primary | ICD-10-CM

## 2022-04-18 PROCEDURE — 99214 OFFICE O/P EST MOD 30 MIN: CPT | Performed by: NURSE PRACTITIONER

## 2022-04-18 PROCEDURE — 93000 ELECTROCARDIOGRAM COMPLETE: CPT | Performed by: NURSE PRACTITIONER

## 2022-04-18 ASSESSMENT — PAIN SCALES - GENERAL: PAINLEVEL: NO PAIN (0)

## 2022-04-18 NOTE — PATIENT INSTRUCTIONS
Preparing for Your Surgery  Getting started  A nurse will call you to review your health history and instructions. They will give you an arrival time based on your scheduled surgery time. Please be ready to share:  Your doctor's clinic name and phone number  Your medical, surgical and anesthesia history  A list of allergies and sensitivities  A list of medicines, including herbal treatments and over-the-counter drugs  Whether the patient has a legal guardian (ask how to send us the papers in advance)  Please tell us if you're pregnant--or if there's any chance you might be pregnant. Some surgeries may injure a fetus (unborn baby), so they require a pregnancy test. Surgeries that are safe for a fetus don't always need a test, and you can choose whether to have one.   If you have a child who's having surgery, please ask for a copy of Preparing for Your Child's Surgery.    Preparing for surgery  Within 30 days of surgery: Have a pre-op exam (sometimes called an H&P, or History and Physical). This can be done at a clinic or pre-operative center.  If you're having a , you may not need this exam. Talk to your care team.  At your pre-op exam, talk to your care team about all medicines you take. If you need to stop any medicines before surgery, ask when to start taking them again.  We do this for your safety. Many medicines can make you bleed too much during surgery. Some change how well surgery (anesthesia) drugs work.  Call your insurance company to let them know you're having surgery. (If you don't have insurance, call [phone].)  Call your clinic if there's any change in your health. This includes signs of a cold or flu (sore throat, runny nose, cough, rash, fever). It also includes a scrape or scratch near the surgery site.  If you have questions on the day of surgery, call your hospital or surgery center.  COVID testing  You may need to be tested for COVID-19 before having surgery. If so, your surgical  team will give you instructions for scheduling this test, separate from your preoperative history and physical.  Eating and drinking guidelines  For your safety: Unless your surgeon tells you otherwise, follow the guidelines below.  Eat and drink as usual until 8 hours before surgery. After that, no food or milk.  Drink clear liquids until 2 hours before surgery. These are liquids you can see through, like water, Gatorade and Propel Water. You may also have black coffee and tea (no cream or milk).  Nothing by mouth within 2 hours of surgery. This includes gum, candy and breath mints.  If you drink alcohol: Stop drinking it the night before surgery.  If your care team tells you to take medicine on the morning of surgery, it's okay to take it with a sip of water.  Preventing infection  Shower or bathe the night before and morning of your surgery. Follow the instructions your clinic gave you. (If no instructions, use regular soap.)  Don't shave or clip hair near your surgery site. We'll remove the hair if needed.  Don't smoke or vape the morning of surgery. You may chew nicotine gum up to 2 hours before surgery. A nicotine patch is okay.  Note: Some surgeries require you to completely quit smoking and nicotine. Check with your surgeon.  Your care team will make every effort to keep you safe from infection. We will:  Clean our hands often with soap and water (or an alcohol-based hand rub).  Clean the skin at your surgery site with a special soap that kills germs.  Give you a special gown to keep you warm. (Cold raises the risk of infection.)  Wear special hair covers, masks, gowns and gloves during surgery.  Give antibiotic medicine, if prescribed. Not all surgeries need antibiotics.  What to bring on the day of surgery  Photo ID and insurance card  Copy of your health care directive, if you have one  Glasses and hearing aides (bring cases)  You can't wear contacts during surgery  Inhaler and eye drops, if you use them  (tell us about these when you arrive)  CPAP machine or breathing device, if you use them  A few personal items, if spending the night  If you have . . .  A pacemaker, ICD (cardiac defibrillator) or other implant: Bring the ID card.  An implanted stimulator: Bring the remote control.  A legal guardian: Bring a copy of the certified (court-stamped) guardianship papers.  Please remove any jewelry, including body piercings. Leave jewelry and other valuables at home.  If you're going home the day of surgery  You must have a responsible adult drive you home. They should stay with you overnight as well.  If you don't have someone to stay with you, and you aren't safe to go home alone, we may keep you overnight. Insurance often won't pay for this.  After surgery  If it's hard to control your pain or you need more pain medicine, please call your surgeon's office.  Questions?   If you have any questions for your care team, list them here: _________________________________________________________________________________________________________________________________________________________________________ ____________________________________ ____________________________________ ____________________________________  For informational purposes only. Not to replace the advice of your health care provider. Copyright   2003, 2019 [facility]. All rights reserved. Clinically reviewed by Myrna Giron MD. [employer] [id number] - REV 07/21.    Before Your Procedure or Hospital Admission  Testing for COVID-19 (Coronavirus)  Thank you for choosing [facility] for your health care needs. The COVID-19 pandemic is a very challenging time for everyone.   Our goal is to keep you and our team here at [facility] safe and healthy. We've taken many steps to make this happen. For example:  We test and screen our staff, care teams and patients for COVID-19.  Everyone at [facility] must wear a mask and stay 6 feet  "apart.  We are limiting hospital and clinic visitors.  Before you come in  If you test COVID-19 positive with any kind of test before your surgery date, call your surgeon's office. We need to know the date of your positive test. We may need to re-schedule your surgery.  Unless you've had a positive COVID-19 test within the past 90 days, you must get tested for COVID-19 even if you've been vaccinated. Your test needs to happen 2 to 4 days before you check in to the hospital or surgery site.  A clinic scheduler will call you about a week in advance to set up a testing time at one of our labs.  Note: If you have a test anywhere but [facility], be sure you get an RT-PCR or an NAAT (Nucleic Acid Amplification Test) test.  Do NOT get a \"rapid antigen\" test. Negative results from \"rapid antigen\" tests are NOT accepted before your surgery.  After the test, please stay at home and out of contact with other people. This will help prevent possible COVID-19 exposure before your treatment. Please follow all current safety guidelines, including:  Limit trips outside your home.  Limit the number of people you see.  Always wear a mask outside your home.  Use social distancing. Stay 6 feet away from others whenever you can.  Wash your hands often.  If your test shows you have COVID-19  If your test is positive, we'll let you know. A positive test means that you have the virus.   We'll probably have to postpone your admission, surgery or procedure. Your doctor will discuss this with you. After that, we'll let you know what to do and when you can re-schedule.   We may need to cancel your treatment on short notice for other reasons, too.  If your test shows you DON'T have COVID-19  Even if your test is negative, you can still get COVID-19. It's rare but, sometimes, the test result is wrong. You could also catch the virus after taking the test.   There's a very small chance that you could catch COVID-19 in the hospital or surgery " center. [facility] has taken many steps to prevent this from happening.   Day of your surgery or procedure  Please come wearing a face covering that covers both your nose and mouth.  When you arrive, we'll ask you some questions to find out if you've had any exposures to COVID-19, or have any signs of COVID-19.  Ask your care team if you can have visitors. All visitors must wear face coverings and will be screened for exposure to, or signs of, COVID-19.  Even if no visitors are allowed, you can still have with you:  Your legal guardian or legal decision maker  Someone to help you, if you are disabled  A parent and one other visitor, if you are younger than 18 years old  A partner and a , if you are in labor  We might need to teach you about taking care of yourself after surgery. If so, a visitor can come into the hospital to learn about it, too.  The rules for visitors change often, depending on how much the virus is spreading. To learn more, see Visiting a Loved One in the Hospital during the COVID-19 Outbreak.  Please call your care team, hospital or surgery center if you have any questions. We thank you for your understanding and for choosing [facility] for your care.   Possible surgery delay  Like you, we want your surgery to happen when it's scheduled. But sometimes the hospital is so full that it's not safe for you to have your surgery. This is especially true during the pandemic. Your surgery may need to be re-scheduled at a later date. If this happens, we will call and tell you.  Questions and answers  Does it matter where I get tested for COVID-19?  Yes. We urge you to get tested at one of our [facility] COVID-19 testing sites. These tests will be either RT-PCR or NAAT, and are accepted. We process these tests in our lab and can get the results quickly. Your [facility] care team needs to get your results before you check in.  What should I do if I can't get tested at   "[facility]?  You can get tested somewhere else, but you'll need to take these extra steps:   Contact your family doctor or clinic to arrange your test.  Take the test within 4 days of your surgery or procedure. We can't accept tests older than 4 days.  Make sure you're getting an RT-PCR test, or a NAAT. Some places use \"rapid antigen\" tests, but we do NOT accept negative results from those tests before your surgery.  Make sure your doctor or clinic faxes your results to [facility] at [phone]. Or take a photo of the results and upload it into [employer].  If we don't get your results in time, we may have to delay or cancel your treatment.  For informational purposes only. Not to replace the advice of your health care provider. Copyright   2020 [facility]. All rights reserved. Clinically reviewed by Infection Prevention and the [facility] COVID-19 Clinical Team. [employer] [id number] - Rev 02/01/22.    Hold aspirin 1 week before surgery   Hold vitamins morning of surgery         "

## 2022-04-18 NOTE — PROGRESS NOTES
[address], [address]  [facility] [address]  Phone: [phone]  Primary Provider: Carline Palm  Pre-op Performing Provider: ALOK AYALA      PREOPERATIVE EVALUATION:  Today's date: 4/18/2022    Amaya Huff is a 71 year old female who presents for a preoperative evaluation.    Surgical Information:  Surgery/Procedure: arthritis in fingers  Surgery Location: [facility]  Surgeon: Dr. Madison  Surgery Date: 05/04/22  Time of Surgery: TBD  Where patient plans to recover: At home with family  Fax number for surgical facility: [phone]    Type of Anesthesia Anticipated: Local and MAC    Assessment & Plan     The proposed surgical procedure is considered INTERMEDIATE risk.    Problem List Items Addressed This Visit    None     Visit Diagnoses     Preop general physical exam    -  Primary    Relevant Orders    EKG 12-lead complete w/read - Clinics (Completed)    Osteoarthritis of right hand, unspecified osteoarthritis type                   Risks and Recommendations:  The patient has the following additional risks and recommendations for perioperative complications:   - No identified additional risk factors other than previously addressed    Medication Instructions:  Patient is to take all scheduled medications on the day of surgery EXCEPT for modifications listed below:   Hold ASA 1 week before surgery   Hold vitamins morning of surgery       RECOMMENDATION:  APPROVAL GIVEN to proceed with proposed procedure, without further diagnostic evaluation.        Subjective     HPI related to upcoming procedure: going for hand surgery   Monitors her BP at home and is always in the 120s systolic.   Has been feeling well lately without complaints     Preop Questions 4/14/2022   1. Have you ever had a heart attack or stroke? No   2. Have you ever had surgery on your heart or blood vessels, such as a stent placement, a coronary artery bypass, or surgery on an  artery in your head, neck, heart, or legs? No   3. Do you have chest pain with activity? No   4. Do you have a history of  heart failure? No   5. Do you currently have a cold, bronchitis or symptoms of other infection? No   6. Do you have a cough, shortness of breath, or wheezing? No   7. Do you or anyone in your family have previous history of blood clots? No   8. Do you or does anyone in your family have a serious bleeding problem such as prolonged bleeding following surgeries or cuts? No   9. Have you ever had problems with anemia or been told to take iron pills? No   10. Have you had any abnormal blood loss such as black, tarry or bloody stools, or abnormal vaginal bleeding? No   11. Have you ever had a blood transfusion? No   12. Are you willing to have a blood transfusion if it is medically needed before, during, or after your surgery? Yes   13. Have you or any of your relatives ever had problems with anesthesia? No   14. Do you have sleep apnea, excessive snoring or daytime drowsiness? No   15. Do you have any artifical heart valves or other implanted medical devices like a pacemaker, defibrillator, or continuous glucose monitor? No   16. Do you have artificial joints? No   17. Are you allergic to latex? No       Health Care Directive:  Patient does not have a Health Care Directive or Living Will: Patient states has Advance Directive and will bring in a copy to clinic.    Preoperative Review of :   reviewed - no record of controlled substances prescribed.      Status of Chronic Conditions:  See problem list for active medical problems.  Problems all longstanding and stable, except as noted/documented.  See ROS for pertinent symptoms related to these conditions.      Review of Systems  Constitutional, neuro, ENT, endocrine, pulmonary, cardiac, gastrointestinal, genitourinary, musculoskeletal, integument and psychiatric systems are negative, except as otherwise noted.    Patient Active Problem List     Diagnosis Date Noted     Acute cystitis without hematuria 12/16/2021     Priority: Medium     Urinary problem 12/16/2021     Priority: Medium     Kidney stone 12/16/2021     Priority: Medium     Family history of malignant neoplasm of breast 10/01/2021     Priority: Medium     Disorder of bone and cartilage 10/01/2021     Priority: Medium     SBO (small bowel obstruction) (H) 05/22/2017     Priority: Medium     Mild CAD 05/19/2016     Priority: Medium     Advanced directives, counseling/discussion 01/07/2015     Priority: Medium     Advance Care Planning:   ACP Review and Resources Provided:  Reviewed chart for advance care plan.  Amaya Huff has no plan or code status on file. Letter sent.  Added by Marianne Smith on 1/7/2015             Premature beats 05/21/2014     Priority: Medium     Problem list name updated by automated process. Provider to review       Mitral valve disorder 05/21/2014     Priority: Medium     Problem list name updated by automated process. Provider to review       Abdominal pain 02/28/2012     Priority: Medium      Past Medical History:   Diagnosis Date     Arthritis 2022     Coronary artery disease     Mild to moderate CAD noted on 3/18/14 coronary angiogram      Heart palpitations      History of angina      Hyperlipidemia with target LDL less than 130 03/24/2015     Diagnosis updated by automated process. Provider to review and confirm.     Irregular heartbeat      Mitral valve disorders(424.0)      Unspecified essential hypertension 1995     Past Surgical History:   Procedure Laterality Date     ABDOMEN SURGERY  1979, 1984,1988    2 C-sections 1 laproscope for fibroids     COLONOSCOPY  02/27/2012    Procedure:COLONOSCOPY; COLONOSCOPY; Surgeon:GINGER PARKS; Location: GI     COLONOSCOPY N/A 06/30/2017    Procedure: COLONOSCOPY;  colonoscopy;  Surgeon: Dez Siegel MD;  Location:  GI     CORONARY ANGIOGRAPHY ADULT ORDER  03/18/2014    3/18/14- Mild to  "moderate CAD, no interventions, treat medically.     ENT SURGERY  tonsils     GYN SURGERY  c sections     [facility] NONSPECIFIC PROCEDURE      S/P T&A     [facility] NONSPECIFIC PROCEDURE      LASER FIBROIDS     [facility] NONSPECIFIC PROCEDURE       x2     Current Outpatient Medications   Medication Sig Dispense Refill     aspirin 81 MG tablet Take 81 mg by mouth daily       atorvastatin (LIPITOR) 20 MG tablet Take 1 tablet (20 mg) by mouth daily 90 tablet 3     Calcium Carbonate-Vitamin D (CALCIUM + D PO) Take 1 chew tab by mouth 2 times daily 1300 mg daily  (650 each)       Fish Oil-Cholecalciferol (OMEGA-3 FISH OIL/VITAMIN D3) 4788-3686 MG-UNIT CAPS        metoprolol tartrate (LOPRESSOR) 25 MG tablet Take 1 tablet (25 mg) by mouth 2 times daily 180 tablet 3     NONFORMULARY nutrafol       tretinoin (RETIN-A) 0.025 % external cream APPLY TO FACE EVERY DAY AT BEDTIME         Allergies   Allergen Reactions     Penicillins      \"severe migraines\"        Social History     Tobacco Use     Smoking status: Never Smoker     Smokeless tobacco: Never Used   Substance Use Topics     Alcohol use: Yes     Comment: 1 glass wine/day     Family History   Problem Relation Age of Onset     C.A.D. Mother      Parkinsonism Mother 80     Breast Cancer Mother 80     Hypertension Mother      Osteoporosis Mother      Circulatory Maternal Grandmother         MVP     Alcohol/Drug Daughter 30     Cerebrovascular Disease Son 40        stroke, PFO     Circulatory Maternal Aunt         MVP     Breast Cancer Maternal Aunt 80     Breast Cancer Other         Maternal Aunt     History   Drug Use No         Objective     BP (!) 149/88 (BP Location: Right arm, Cuff Size: Adult Regular)   Pulse 69   Temp 98.4  F (36.9  C) (Tympanic)   Resp 14   Ht 1.67 m (5' 5.75\")   Wt 62.8 kg (138 lb 6.4 oz)   SpO2 100%   BMI 22.51 kg/m      Physical Exam    GENERAL APPEARANCE: healthy, alert and no distress     EYES: EOMI     RESP: lungs clear to auscultation - no " rales, rhonchi or wheezes     CV: regular rates and rhythm, normal S1 S2, no S3 or S4 and no murmur, click or rub     MS: extremities normal- no gross deformities noted, no evidence of inflammation in joints, FROM in all extremities.     SKIN: no suspicious lesions or rashes     NEURO: Normal strength and tone, sensory exam grossly normal, mentation intact and speech normal     PSYCH: mentation appears normal. and affect normal/bright    Recent Labs   Lab Test 10/01/21  0814 07/07/20  0818   HGB 12.8 12.4    248    140   POTASSIUM 3.8 4.1   CR 0.75 0.81        Diagnostics:  No labs were ordered during this visit.   EKG: appears normal, NSR, normal axis, normal intervals, no acute ST/T changes c/w ischemia, no LVH by voltage criteria    Revised Cardiac Risk Index (RCRI):  The patient has the following serious cardiovascular risks for perioperative complications:   - No serious cardiac risks = 0 points     RCRI Interpretation: 0 points: Class I (very low risk - 0.4% complication rate)           Signed Electronically by: TRICIA Beckham CNP  Copy of this evaluation report is provided to requesting physician.

## 2022-04-25 ENCOUNTER — TELEPHONE (OUTPATIENT)
Dept: FAMILY MEDICINE | Facility: CLINIC | Age: 72
End: 2022-04-25
Payer: MEDICARE

## 2022-04-25 NOTE — TELEPHONE ENCOUNTER
Pt called tested positive yesterday for COVID-19     Friday evening had scratchy throat     Coughing and feels ill today     Asking about medication for COVID19     Had fever 100 F Saturday, 101 F Saturday night     No temp today    No CP or SOB     Cold/headache symptoms     Discussed doing a VV - through MyChart for COVID19 treatment options     Pt agreeable to this     Nai BLOCK, Triage RN  [facility]

## 2022-05-17 ENCOUNTER — HOSPITAL ENCOUNTER (OUTPATIENT)
Facility: CLINIC | Age: 72
Setting detail: OBSERVATION
Discharge: HOME | End: 2022-05-18
Attending: EMERGENCY MEDICINE | Admitting: INTERNAL MEDICINE
Payer: COMMERCIAL

## 2022-05-17 ENCOUNTER — APPOINTMENT (OUTPATIENT)
Dept: MRI IMAGING | Facility: CLINIC | Age: 72
End: 2022-05-17
Attending: EMERGENCY MEDICINE
Payer: COMMERCIAL

## 2022-05-17 ENCOUNTER — APPOINTMENT (OUTPATIENT)
Dept: CT IMAGING | Facility: CLINIC | Age: 72
End: 2022-05-17
Attending: EMERGENCY MEDICINE
Payer: COMMERCIAL

## 2022-05-17 DIAGNOSIS — R20.2 FACIAL PARESTHESIA: ICD-10-CM

## 2022-05-17 DIAGNOSIS — R51.9 CHRONIC INTRACTABLE HEADACHE, UNSPECIFIED HEADACHE TYPE: ICD-10-CM

## 2022-05-17 DIAGNOSIS — R42 DIZZINESS: ICD-10-CM

## 2022-05-17 DIAGNOSIS — G89.29 CHRONIC INTRACTABLE HEADACHE, UNSPECIFIED HEADACHE TYPE: ICD-10-CM

## 2022-05-17 DIAGNOSIS — R07.9 CHEST PAIN, UNSPECIFIED TYPE: ICD-10-CM

## 2022-05-17 LAB
ANION GAP SERPL CALCULATED.3IONS-SCNC: 8 MMOL/L (ref 3–14)
APTT PPP: 24 SECONDS (ref 22–38)
ATRIAL RATE - MUSE: 62 BPM
BASOPHILS # BLD AUTO: 0 10E3/UL (ref 0–0.2)
BASOPHILS NFR BLD AUTO: 1 %
BUN SERPL-MCNC: 18 MG/DL (ref 7–30)
CALCIUM SERPL-MCNC: 9.1 MG/DL (ref 8.5–10.1)
CHLORIDE BLD-SCNC: 106 MMOL/L (ref 94–109)
CO2 SERPL-SCNC: 26 MMOL/L (ref 20–32)
CREAT SERPL-MCNC: 0.68 MG/DL (ref 0.52–1.04)
DIASTOLIC BLOOD PRESSURE - MUSE: NORMAL MMHG
EOSINOPHIL # BLD AUTO: 0.1 10E3/UL (ref 0–0.7)
EOSINOPHIL NFR BLD AUTO: 2 %
ERYTHROCYTE [DISTWIDTH] IN BLOOD BY AUTOMATED COUNT: 12.4 % (ref 10–15)
GFR SERPL CREATININE-BSD FRML MDRD: >90 ML/MIN/1.73M2
GLUCOSE BLD-MCNC: 96 MG/DL (ref 70–99)
HCT VFR BLD AUTO: 38.2 % (ref 35–47)
HGB BLD-MCNC: 12.3 G/DL (ref 11.7–15.7)
HOLD SPECIMEN: NORMAL
IMM GRANULOCYTES # BLD: 0 10E3/UL
IMM GRANULOCYTES NFR BLD: 0 %
INR PPP: 0.99 (ref 0.85–1.15)
INTERPRETATION ECG - MUSE: NORMAL
LYMPHOCYTES # BLD AUTO: 1.4 10E3/UL (ref 0.8–5.3)
LYMPHOCYTES NFR BLD AUTO: 34 %
MCH RBC QN AUTO: 27.9 PG (ref 26.5–33)
MCHC RBC AUTO-ENTMCNC: 32.2 G/DL (ref 31.5–36.5)
MCV RBC AUTO: 87 FL (ref 78–100)
MONOCYTES # BLD AUTO: 0.4 10E3/UL (ref 0–1.3)
MONOCYTES NFR BLD AUTO: 10 %
NEUTROPHILS # BLD AUTO: 2.3 10E3/UL (ref 1.6–8.3)
NEUTROPHILS NFR BLD AUTO: 53 %
NRBC # BLD AUTO: 0 10E3/UL
NRBC BLD AUTO-RTO: 0 /100
P AXIS - MUSE: 55 DEGREES
PLATELET # BLD AUTO: 276 10E3/UL (ref 150–450)
POTASSIUM BLD-SCNC: 3.2 MMOL/L (ref 3.4–5.3)
POTASSIUM BLD-SCNC: 3.6 MMOL/L (ref 3.4–5.3)
PR INTERVAL - MUSE: 120 MS
QRS DURATION - MUSE: 92 MS
QT - MUSE: 408 MS
QTC - MUSE: 414 MS
R AXIS - MUSE: 11 DEGREES
RBC # BLD AUTO: 4.41 10E6/UL (ref 3.8–5.2)
SODIUM SERPL-SCNC: 140 MMOL/L (ref 133–144)
SYSTOLIC BLOOD PRESSURE - MUSE: NORMAL MMHG
T AXIS - MUSE: 37 DEGREES
TROPONIN I SERPL HS-MCNC: 6 NG/L
VENTRICULAR RATE- MUSE: 62 BPM
WBC # BLD AUTO: 4.3 10E3/UL (ref 4–11)

## 2022-05-17 PROCEDURE — G0378 HOSPITAL OBSERVATION PER HR: HCPCS

## 2022-05-17 PROCEDURE — 85730 THROMBOPLASTIN TIME PARTIAL: CPT | Performed by: EMERGENCY MEDICINE

## 2022-05-17 PROCEDURE — 84484 ASSAY OF TROPONIN QUANT: CPT | Performed by: EMERGENCY MEDICINE

## 2022-05-17 PROCEDURE — 99291 CRITICAL CARE FIRST HOUR: CPT | Mod: 25

## 2022-05-17 PROCEDURE — 84132 ASSAY OF SERUM POTASSIUM: CPT | Performed by: INTERNAL MEDICINE

## 2022-05-17 PROCEDURE — 85025 COMPLETE CBC W/AUTO DIFF WBC: CPT | Performed by: EMERGENCY MEDICINE

## 2022-05-17 PROCEDURE — G1004 CDSM NDSC: HCPCS

## 2022-05-17 PROCEDURE — 99220 PR INITIAL OBSERVATION CARE,LEVEL III: CPT | Performed by: INTERNAL MEDICINE

## 2022-05-17 PROCEDURE — 36415 COLL VENOUS BLD VENIPUNCTURE: CPT | Performed by: EMERGENCY MEDICINE

## 2022-05-17 PROCEDURE — 250N000009 HC RX 250: Performed by: EMERGENCY MEDICINE

## 2022-05-17 PROCEDURE — 36415 COLL VENOUS BLD VENIPUNCTURE: CPT | Performed by: INTERNAL MEDICINE

## 2022-05-17 PROCEDURE — 93005 ELECTROCARDIOGRAM TRACING: CPT

## 2022-05-17 PROCEDURE — 70496 CT ANGIOGRAPHY HEAD: CPT | Mod: ME

## 2022-05-17 PROCEDURE — 250N000011 HC RX IP 250 OP 636: Performed by: EMERGENCY MEDICINE

## 2022-05-17 PROCEDURE — 70553 MRI BRAIN STEM W/O & W/DYE: CPT | Mod: ME

## 2022-05-17 PROCEDURE — 250N000013 HC RX MED GY IP 250 OP 250 PS 637: Performed by: INTERNAL MEDICINE

## 2022-05-17 PROCEDURE — 99292 CRITICAL CARE ADDL 30 MIN: CPT

## 2022-05-17 PROCEDURE — 96374 THER/PROPH/DIAG INJ IV PUSH: CPT | Mod: 59

## 2022-05-17 PROCEDURE — A9585 GADOBUTROL INJECTION: HCPCS | Performed by: EMERGENCY MEDICINE

## 2022-05-17 PROCEDURE — 70498 CT ANGIOGRAPHY NECK: CPT | Mod: ME

## 2022-05-17 PROCEDURE — 255N000002 HC RX 255 OP 636: Performed by: EMERGENCY MEDICINE

## 2022-05-17 PROCEDURE — 80048 BASIC METABOLIC PNL TOTAL CA: CPT | Performed by: EMERGENCY MEDICINE

## 2022-05-17 PROCEDURE — 85610 PROTHROMBIN TIME: CPT | Performed by: EMERGENCY MEDICINE

## 2022-05-17 PROCEDURE — 0042T CT HEAD PERFUSION WITH CONTRAST: CPT | Mod: GZ

## 2022-05-17 RX ORDER — LORAZEPAM 2 MG/ML
1 INJECTION INTRAMUSCULAR
Status: COMPLETED | OUTPATIENT
Start: 2022-05-17 | End: 2022-05-17

## 2022-05-17 RX ORDER — ATORVASTATIN CALCIUM 20 MG/1
20 TABLET, FILM COATED ORAL DAILY
Status: DISCONTINUED | OUTPATIENT
Start: 2022-05-17 | End: 2022-05-18 | Stop reason: HOSPADM

## 2022-05-17 RX ORDER — GADOBUTROL 604.72 MG/ML
6 INJECTION INTRAVENOUS ONCE
Status: COMPLETED | OUTPATIENT
Start: 2022-05-17 | End: 2022-05-17

## 2022-05-17 RX ORDER — ONDANSETRON 4 MG/1
4 TABLET, ORALLY DISINTEGRATING ORAL EVERY 6 HOURS PRN
Status: DISCONTINUED | OUTPATIENT
Start: 2022-05-17 | End: 2022-05-18 | Stop reason: HOSPADM

## 2022-05-17 RX ORDER — POTASSIUM CHLORIDE 1500 MG/1
20 TABLET, EXTENDED RELEASE ORAL ONCE
Status: COMPLETED | OUTPATIENT
Start: 2022-05-17 | End: 2022-05-17

## 2022-05-17 RX ORDER — IOPAMIDOL 755 MG/ML
125 INJECTION, SOLUTION INTRAVASCULAR ONCE
Status: COMPLETED | OUTPATIENT
Start: 2022-05-17 | End: 2022-05-17

## 2022-05-17 RX ORDER — METOPROLOL TARTRATE 25 MG/1
25 TABLET, FILM COATED ORAL 2 TIMES DAILY
Status: DISCONTINUED | OUTPATIENT
Start: 2022-05-17 | End: 2022-05-18 | Stop reason: HOSPADM

## 2022-05-17 RX ORDER — OMEGA-3-ACID ETHYL ESTERS 1 G/1
2 CAPSULE, LIQUID FILLED ORAL DAILY
COMMUNITY
End: 2022-08-19

## 2022-05-17 RX ORDER — ONDANSETRON 2 MG/ML
4 INJECTION INTRAMUSCULAR; INTRAVENOUS EVERY 6 HOURS PRN
Status: DISCONTINUED | OUTPATIENT
Start: 2022-05-17 | End: 2022-05-18 | Stop reason: HOSPADM

## 2022-05-17 RX ORDER — MECLIZINE HYDROCHLORIDE 25 MG/1
25 TABLET ORAL 4 TIMES DAILY
Status: DISCONTINUED | OUTPATIENT
Start: 2022-05-17 | End: 2022-05-18 | Stop reason: HOSPADM

## 2022-05-17 RX ADMIN — LORAZEPAM 1 MG: 2 INJECTION INTRAMUSCULAR; INTRAVENOUS at 12:13

## 2022-05-17 RX ADMIN — MECLIZINE HYDROCHLORIDE 25 MG: 25 TABLET ORAL at 20:42

## 2022-05-17 RX ADMIN — MECLIZINE HYDROCHLORIDE 25 MG: 25 TABLET ORAL at 18:05

## 2022-05-17 RX ADMIN — GADOBUTROL 6 ML: 604.72 INJECTION INTRAVENOUS at 12:22

## 2022-05-17 RX ADMIN — SODIUM CHLORIDE 100 ML: 900 INJECTION INTRAVENOUS at 10:35

## 2022-05-17 RX ADMIN — POTASSIUM CHLORIDE 20 MEQ: 1500 TABLET, EXTENDED RELEASE ORAL at 18:05

## 2022-05-17 RX ADMIN — ATORVASTATIN CALCIUM 20 MG: 20 TABLET, FILM COATED ORAL at 20:42

## 2022-05-17 RX ADMIN — METOPROLOL TARTRATE 25 MG: 25 TABLET, FILM COATED ORAL at 20:42

## 2022-05-17 RX ADMIN — IOPAMIDOL 125 ML: 755 INJECTION, SOLUTION INTRAVENOUS at 10:35

## 2022-05-17 ASSESSMENT — ENCOUNTER SYMPTOMS
SPEECH DIFFICULTY: 0
LIGHT-HEADEDNESS: 1
DIZZINESS: 1

## 2022-05-17 NOTE — PROVIDER NOTIFICATION
MD Notification    Notified Person: MD    Notified Person Name: Dr. Evans    Notification Date/Time: 5/17/2022 1530    Notification Interaction: Web based paging    Purpose of Notification: Patient just came up from ED, K+ 3.2, are you replacing or rechecking can you please address this thank you!    Orders Received: 20 MeQ ordered for now scheduled, recheck K+, and RN K+ management put in    Comments:

## 2022-05-17 NOTE — CONSULTS
[facility]    Stroke Telephone Note    I was called by Holli Gaspar on 05/17/22 regarding patient Amaya Huff. The patient is a 71 year old female  has a past medical history of Arthritis (2022), Coronary artery disease, Heart palpitations, History of angina, Hyperlipidemia with target LDL less than 130 (03/24/2015), Irregular heartbeat, Mitral valve disorders(424.0), and Unspecified essential hypertension (1995).    Patient notes a sensation of feeling lightheaded/woozy and notes that symptoms are exacerbated by eye movements. Patient with last known well 5/16/22 at 2245 and symptoms on waking this morning. While in the ed symptoms still present when patient complained of mild right facial paresthesias.     Stroke Code Data (for stroke code without tele)  Stroke code activated 05/17/22   1024   Stroke provider first response  05/17/22   1021            Last known normal 05/16/22   2245        Time of discovery   (or onset of symptoms) 05/17/22       Head CT read by Stroke Neuro Dr/Provider 05/17/22   1047   Was stroke code de-escalated? Yes 05/17/22 1057          Imaging Findings   Results for orders placed or performed during the hospital encounter of 05/17/22   Head CT w/o contrast    Impression    IMPRESSION:  Age-related changes as above with no acute intracranial  abnormality.   CTA Head Neck with Contrast    Impression    IMPRESSION:  HEAD CTA:  1.  No significant intracranial stenosis.    2.  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    NECK CTA:  1.  No high-grade stenosis of the neck vessels by NASCET criteria,  with mild narrowing at the proximal internal carotid arteries  bilaterally, along with mild narrowing at the left carotid  bifurcation.    Findings were discussed with Dr. Holli Gaspar via telephone at 1055  hours on 5/17/2022.    CT Head Perfusion w  "Contrast    Impression    IMPRESSION: No definite perfusion findings to suggest a recent large  vascular distribution infarct.      Radiation dose for this scan was reduced using automated exposure  control, adjustment of the mA and/or kV according to patient size, or  iterative reconstruction technique    SEAN TATE MD         SYSTEM ID:  [id number]          Intravenous Thrombolysis  Not given due to:   - unclear or unfavorable risk-benefit profile for extended window thrombolysis beyond the conventional 4.5 hour time window    Endovascular Treatment  Not initiated due to absence of proximal vessel occlusion    Impression  PossibleAcute ischemic stroke of posterior circulation due to undetermined etiology       Recommendations   > MRI brain with and without contrast    Please page Stroke team on call when imaging complete for further recommendations.     My recommendations are based on the information provided over the phone by Amaya Huff's in-person providers. They are not intended to replace the clinical judgment of her in-person providers. I was not requested to personally see or examine the patient at this time.    The Stroke Staff is Dr. RITIKA Larson MD  Vascular Neurology Fellow  To page me or covering stroke neurology team member, click here: [facility]COM   Choose \"On Call\" tab at top, then search dropdown box for \"Neurology Adult\", select location, press Enter, then look for stroke/neuro ICU/telestroke.           "

## 2022-05-17 NOTE — PROGRESS NOTES
RECEIVING UNIT ED HANDOFF REVIEW    ED Nurse Handoff Report was reviewed by: Soraya Stokes RN on May 17, 2022 at 1:36 PM

## 2022-05-17 NOTE — ED NOTES
"Pt reports \"prolapsed Mitral Valve.\" Pt reports extensive cardiac hx. Pt reports woke up this morning with nausea, chest pain, light headedness, and dizziness. Pt reports hx of irregular heart beat, but does not feel like she is in it at this time.    "

## 2022-05-17 NOTE — ED TRIAGE NOTES
Triage Assessment     Row Name 05/17/22 1006       Triage Assessment (Adult)    Airway WDL WDL       Respiratory WDL    Respiratory WDL WDL    Rhythm/Pattern, Respiratory pattern regular    Cough Frequency no cough       Skin Circulation/Temperature WDL    Skin Circulation/Temperature WDL WDL       Cardiac WDL    Cardiac WDL WDL       Peripheral/Neurovascular WDL    Peripheral Neurovascular WDL WDL       Cognitive/Neuro/Behavioral WDL    Cognitive/Neuro/Behavioral WDL WDL    Level of Consciousness alert    Arousal Level opens eyes spontaneously    Orientation oriented x 4    Speech clear;logical    Mood/Behavior calm;cooperative       Pupils (CN II)    Pupil PERRLA yes       [place] Coma Scale    Best Eye Response 4-->(E4) spontaneous    Best Motor Response 6-->(M6) obeys commands    Best Verbal Response 5-->(V5) oriented    [place] Coma Scale Score 15    Row Name 05/17/22 0935       Respiratory WDL    Rhythm/Pattern, Respiratory depth regular;pattern regular;unlabored       Peripheral/Neurovascular WDL    Capillary Refill, General less than/equal to 3 secs       [place] Coma Scale    Best Eye Response 4-->(E4) spontaneous    Best Motor Response 6-->(M6) obeys commands    Best Verbal Response 5-->(V5) oriented    [place] Coma Scale Score 15

## 2022-05-17 NOTE — ED NOTES
Patient has a new right facial droop and tingling - both patient and spouse report that it is not her normal smile. Charge nurse and MD notified. Tier 2 code stroke called.

## 2022-05-17 NOTE — PROGRESS NOTES
\VSS on RA. A&Ox4.Stroke ruled out. MRI negative. Neurology to see patient. C/o headache and dizziness. Neuro check intact.    at bedside.

## 2022-05-17 NOTE — PHARMACY-ADMISSION MEDICATION HISTORY
Pharmacy Medication History  Admission medication history interview status for the 5/17/2022  admission is complete. See EPIC admission navigator for prior to admission medications     Location of Interview: Phone  Medication history sources: Spoke w/ patient.  Reviewed recent fill history through [employer].     In the past week, patient estimated taking medication this percent of the time: greater than 90%    Medication reconciliation completed by provider prior to medication history? No    Time spent in this activity: 15 minutes    Prior to Admission medications    Medication Sig Last Dose Taking? Auth Provider   aspirin 81 MG tablet Take 81 mg by mouth daily 5/17/2022 at am Yes Reported, Patient   atorvastatin (LIPITOR) 20 MG tablet Take 1 tablet (20 mg) by mouth daily 5/16/2022 at pm Yes Carline Palm MD   Calcium Carbonate-Vitamin D (CALCIUM + D PO) Take 1 chew tab by mouth 2 times daily 1300 mg daily  (650 each) 5/16/2022 at pm Yes Reported, Patient   metoprolol tartrate (LOPRESSOR) 25 MG tablet Take 1 tablet (25 mg) by mouth 2 times daily 5/17/2022 at am Yes Carline Palm MD   NONFORMULARY nutrafol 5/16/2022 Yes Reported, Patient   omega-3 acid ethyl esters (LOVAZA) 1 g capsule Take 2 g by mouth daily 5/16/2022 Yes Unknown, Entered By History   tretinoin (RETIN-A) 0.025 % external cream APPLY TO FACE EVERY DAY AT BEDTIME 5/16/2022 Yes Reported, Patient       The information provided in this note is only as accurate as the sources available at the time of update(s)     Emily Palencia, PharmD, BCPS

## 2022-05-17 NOTE — ED PROVIDER NOTES
History   Chief Complaint:  Lightheadedness    The history is provided by the patient and the spouse.      Amaya Huff is a 71 year old female with history of hypertension, coronary artery disease, and mitral valve disorder who presents with lightheadedness. She went to bed last night around  and did not have any symptoms then. Then this morning, she was getting out of bed and suddenly felt lightheaded. She sat savanna for a while and took a shower, but her lightheadedness did not resolve. Other symptoms mentioned include dizziness and slight chest pain. She also feels like she is going to fall over when she walks. She took a dose of Baby Asprin this morning. Her  mentions that she had Covid-19 on 2022 and continues have a headache since then. Her blood pressure during that time was elevated, but is now improved. She is compliant with her hypertension medications. She denies speech difficulty or double vision.     Review of Systems   Eyes: Negative for visual disturbance.   Cardiovascular: Positive for chest pain.   Musculoskeletal: Positive for gait problem.   Neurological: Positive for dizziness and light-headedness. Negative for speech difficulty.   All other systems reviewed and are negative.    Allergies:  Penicillins    Medications:  Lipitor   Lopressor   Paxlovid     Past Medical History:     Arthritis   CAD  Palpitations   Angina   HLD   Irregular heartbeat   Mitral valve disorder   HTN  A-fib     Past Surgical History:       Tonsillectomy/adenoidectomy     Family History:    Mother - CAD, Parkinsonism, breast cancer, HTN, osteoporosis   Daughter - alcohol/drug   Son - stroke, PFO     Social History:  Patient presents to the ED with her  via private vehicle   Hx of alcohol use     Physical Exam     Patient Vitals for the past 24 hrs:   BP Temp Temp src Pulse Resp SpO2 Height Weight   22 1200 (!) 142/79 -- -- 68 11 99 % -- --   22 1100 (!) 156/81 -- -- 70 13  "(!) 82 % -- --   05/17/22 1030 (!) 159/84 -- -- 58 14 -- -- --   05/17/22 1000 (!) 167/86 -- -- 56 8 -- 1.676 m (5' 6\") 61.2 kg (135 lb)   05/17/22 0955 (!) 168/93 -- -- 68 12 -- -- --   05/17/22 0953 (!) 179/99 -- -- 62 9 100 % -- --   05/17/22 0946 (!) 188/85 97.6  F (36.4  C) Oral 75 24 100 % 1.676 m (5' 6\") 61.2 kg (135 lb)     Physical Exam  General: Well-nourished  Eyes: PERRL, conjunctivae pink no scleral icterus or conjunctival injection  ENT:  Moist mucus membranes, posterior oropharynx clear without erythema or exudates  Respiratory:  Lungs clear to auscultation bilaterally, no crackles/rubs/wheezes.  Good air movement  CV: Normal rate and rhythm, no murmurs/rubs/gallops  GI:  Abdomen soft and non-distended.  Normoactive BS.  No tenderness, guarding or rebound  Skin: Warm, dry.  No rashes or petechiae  Musculoskeletal: No peripheral edema or calf tenderness  Neuro: Alert and oriented to person/place/time. PERRL, EOMI no nystagmus, no aphasia/facial droop/dysarthria, tongue midline, symmetric palatal elevation, normal strength at SCM/trapezius/BUE/BLE, normal coordination to FNF at BUE, negative romberg, sensation intact to LT over face/BUE/BLE  Psychiatric: Anxious affect    Emergency Department Course   ECG:  ECG taken at 0942, ECG read at 0946  Normal sinus rhythm   Nonspecific ST and T wave abnormality   Abnormal ECG   Rate 62 bpm. WA interval 120 ms. QRS duration 92 ms. QT/QTc 408/414 ms. P-R-T axes 55 11 37.     Imaging:  CT Head Perfusion w Contrast  Final Result  IMPRESSION: No definite perfusion findings to suggest a recent large  vascular distribution infarct.  Radiation dose for this scan was reduced using automated exposure  control, adjustment of the mA and/or kV according to patient size, or  iterative reconstruction technique  SEAN TATE MD     CTA Head Neck with Contrast  Preliminary Result  IMPRESSION:  HEAD CTA:  1.  No significant intracranial stenosis.  2.  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  NECK CTA:  1.  No high-grade stenosis of the neck vessels by NASCET criteria,  with mild narrowing at the proximal internal carotid arteries  bilaterally, along with mild narrowing at the left carotid  bifurcation.  Findings were discussed with Dr. Holli Gaspar via telephone at 1055  hours on 5/17/2022.     Head CT w/o contrast  Preliminary Result  IMPRESSION:  Age-related changes as above with no acute intracranial  abnormality.    MR Brain w/o & w Contrast     Results Pending    Report per radiology    Laboratory:  Labs Ordered and Resulted from Time of ED Arrival to Time of ED Departure   BASIC METABOLIC PANEL - Abnormal       Result Value    Sodium 140      Potassium 3.2 (*)     Chloride 106      Carbon Dioxide (CO2) 26      Anion Gap 8      Urea Nitrogen 18      Creatinine 0.68      Calcium 9.1      Glucose 96      GFR Estimate >90     INR - Normal    INR 0.99     PARTIAL THROMBOPLASTIN TIME - Normal    aPTT 24     TROPONIN I - Normal    Troponin I High Sensitivity 6     CBC WITH PLATELETS AND DIFFERENTIAL    WBC Count 4.3      RBC Count 4.41      Hemoglobin 12.3      Hematocrit 38.2      MCV 87      MCH 27.9      MCHC 32.2      RDW 12.4      Platelet Count 276      % Neutrophils 53      % Lymphocytes 34      % Monocytes 10      % Eosinophils 2      % Basophils 1      % Immature Granulocytes 0      NRBCs per 100 WBC 0      Absolute Neutrophils 2.3      Absolute Lymphocytes 1.4      Absolute Monocytes 0.4      Absolute Eosinophils 0.1      Absolute Basophils 0.0      Absolute Immature Granulocytes 0.0      Absolute NRBCs 0.0     GLUCOSE MONITOR NURSING POCT     Emergency Department Course:    Reviewed:  I reviewed nursing notes, vitals and past medical history    Assessments:  9007 I obtained history and examined the patient as noted above.   1015 I rechecked the patient after being  informed that the patient recently developed a facial droop and tingling on her cheek. I discussed plan for admission to the hospital.   1023 Tier 2 Code Stroke Activated.     Consults:  1018 I spoke with Stroke Neurology.  1057 I spoke with Radiology regarding findings.   1102 I spoke with Stroke Neurology.  1111 I spoke with Dr. Evans from [facility], who accepts the patient for admission.    Interventions:  1213 Ativan 1 mg IV   1222 Gadavist 6 ml IV     Disposition:  The patient was admitted to the hospital under the care of Dr. Evans.     Impression & Plan   Medical Decision Making:  Amaya Huff is a 71 year old female who comes with dizziness and a feeling like she might faint.  It is difficult to discern if this is cardiac in orthostasis versus disequilibrium secondary to a neurologic event vs some sort of post-COVID dysautonomia.  EKG was nonischemic.  Troponin was negative.  She is saturating well.  Electrolytes are otherwise unremarkable.  Shortly into her stay, she subsequently felt tingling on the right side of her face and there was concern that she may have some mild facial droop.  By the time I examined her, few minutes after she had this concern, I was unable to appreciate the droop but a code stroke was activated. Head CT showed no bleed.  Head CT showed no bleed.  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  Given the possibility that this represents TIA or stroke, we will admit the patient for MRI and further monitoring and evaluation.  She can remain on telemetry to help rule out intermittent cardiac arrhythmias or other cause for her symptoms.  Dr. Evans graciously agreed to meet the patient. The patient was updated on the incidental finding of the aneurysm and understands the need to followup.    Diagnosis:    ICD-10-CM    1. Dizziness  R42    2. Facial paresthesia  R20.2    3. Chronic intractable headache, unspecified headache type  R51.9     G89.29    4. Chest pain,  unspecified type  R07.9        Scribe Disclosure:  I, Manpreet Jack, am serving as a scribe at 9:49 AM on 5/17/2022 to document services personally performed by Holli Gaspar MD based on my observations and the provider's statements to me.        Holli Gaspar MD  05/17/22 [id number]

## 2022-05-17 NOTE — PROGRESS NOTES
Observation Goals  Resolution of vertigo   Partially Met (some dizziness with walking, none at rest but feels better from this AM)  Home safety   Not Met (PT pending but it seems patient lives at home with her  and doesn't seem to be any concerns per RN standpoint)  Able to tolerate diet  Met (tolerating regular diet)

## 2022-05-18 VITALS
DIASTOLIC BLOOD PRESSURE: 75 MMHG | SYSTOLIC BLOOD PRESSURE: 144 MMHG | WEIGHT: 135 LBS | HEART RATE: 59 BPM | HEIGHT: 66 IN | OXYGEN SATURATION: 97 % | RESPIRATION RATE: 16 BRPM | BODY MASS INDEX: 21.69 KG/M2 | TEMPERATURE: 97.9 F

## 2022-05-18 DIAGNOSIS — I72.9 ANEURYSM (H): Primary | ICD-10-CM

## 2022-05-18 PROCEDURE — 250N000013 HC RX MED GY IP 250 OP 250 PS 637: Performed by: INTERNAL MEDICINE

## 2022-05-18 PROCEDURE — G0378 HOSPITAL OBSERVATION PER HR: HCPCS

## 2022-05-18 PROCEDURE — 999N000147 HC STATISTIC PT IP EVAL DEFER

## 2022-05-18 PROCEDURE — G0463 HOSPITAL OUTPT CLINIC VISIT: HCPCS | Performed by: PHYSICIAN ASSISTANT

## 2022-05-18 PROCEDURE — 99217 PR OBSERVATION CARE DISCHARGE: CPT | Performed by: INTERNAL MEDICINE

## 2022-05-18 RX ORDER — MECLIZINE HYDROCHLORIDE 25 MG/1
25 TABLET ORAL 4 TIMES DAILY
Qty: 10 TABLET | Refills: 0 | Status: SHIPPED | OUTPATIENT
Start: 2022-05-18 | End: 2022-06-02

## 2022-05-18 RX ADMIN — MECLIZINE HYDROCHLORIDE 25 MG: 25 TABLET ORAL at 11:59

## 2022-05-18 RX ADMIN — MECLIZINE HYDROCHLORIDE 25 MG: 25 TABLET ORAL at 08:10

## 2022-05-18 RX ADMIN — METOPROLOL TARTRATE 25 MG: 25 TABLET, FILM COATED ORAL at 08:10

## 2022-05-18 RX ADMIN — MECLIZINE HYDROCHLORIDE 25 MG: 25 TABLET ORAL at 16:06

## 2022-05-18 NOTE — PLAN OF CARE
Observation goals:  Resolution of vertigo- met   Home safety- not met  Able to tolerate diet- met

## 2022-05-18 NOTE — PLAN OF CARE
Orientation/Cognitive: AOX4  Observation Goals (Met/ Not Met): Not met  Mobility Level/Assist Equipment: SBA  Fall Risk (Y/N): Yes but steady on feet (more for dizzy precautions)  Behavior Concerns: none  Pain Management: none  Tele/VS/O2: Tele NSR, VSS on room air  ABNL Lab/BG: Potassium back at 3.6 this shift  Diet: Regular tolerating well  Bowel/Bladder: n/a  Skin Concerns: none  Drains/Devices: PIV SL  Tests/Procedures for next shift: physical therapy pending  Anticipated DC date & active delays: 5/18, PT pending and patient still some dizziness with walking  Patient Stated Goal for Today: for dizziness to go away completely and see why she is dizzy

## 2022-05-18 NOTE — PROGRESS NOTES
Observation Goals  Resolution of vertigo   Partially Met (some dizziness with walking, none at rest but feels better from this AM)  Home safety   Not Met   Able to tolerate diet  Met (tolerating regular diet)

## 2022-05-18 NOTE — H&P
[facility]    History and Physical - Hospitalist Service       Date of Admission:  5/17/2022    Assessment & Plan      Amaya Huff is a 71 year old female admitted on 5/17/2022. She has hx of CAD, MVP and woke up in the morning with vertiginous symptoms and a negative stroke work up including CTH, CTA, MRI but an incidental basilar tip aneurysm.    1. Vertigo  -- stroke work up negative including CTA, CTA, MRI  -- scheduled meclizine  -- PT for home safety    2. Mild Chest pain  ASCVD  -- associated with elevated bp  -- trop negative  -- continue metop and lipitor    3. Basilar tip aneurysm  -- incidental finding  -- neurosurgery consult and likely outpatient surveillance     Diet: Regular Diet Adult    DVT Prophylaxis: Low Risk/Ambulatory with no VTE prophylaxis indicated  Adam Catheter: Not present  Central Lines: None  Cardiac Monitoring: None  Code Status: Full Code     Disposition Plan    Anticipate home with assist  The patient's care was discussed with the Patient's Family.    Dandy Evans MD  Hospitalist Service  [facility]  Securely message with the Vocera Web Console (learn more here)  Text page via kompany Paging/Directory         ______________________________________________________________________    Chief Complaint   Dizziness, room spinning    History is obtained from the patient    History of Present Illness   Amaya Huff is a 71 year old female with hx of HTN, CAD, MVP for which she is followed by cardiology, no prior hx of stroke or TIA went to bed on 5/16 night without any problem but woke up with dizziness when she woke up on the morning of her admission.  She sad down for a while and even took a shower and waited and her symptoms have not improved.  She took her daily dose of asa and relayed she had covid infection on 4/24 and only has some lingering headaches that is residual.  She also noted her bp elevated which is  unusual with slight chest discomfort.  She came into [facility] ED for further evaluation.  She was seen by Dr Gaspar and underwent stroke work up including CTH and CTA which did not show anything acute but a basilar tip aneurysm measuring 5 mm in all dimensions.  MRI did not show any stroke    Review of Systems    The 10 point Review of Systems is negative other than noted in the HP    Past Medical History    I have reviewed this patient's medical history and updated it with pertinent information if needed.   Past Medical History:   Diagnosis Date     Arthritis      Coronary artery disease     Mild to moderate CAD noted on 3/18/14 coronary angiogram      Heart palpitations      History of angina      Hyperlipidemia with target LDL less than 130 2015     Diagnosis updated by automated process. Provider to review and confirm.     Irregular heartbeat      Mitral valve disorders(424.0)      Unspecified essential hypertension        Past Surgical History   I have reviewed this patient's surgical history and updated it with pertinent information if needed.  Past Surgical History:   Procedure Laterality Date     ABDOMEN SURGERY  , ,    2 C-sections 1 laproscope for fibroids     COLONOSCOPY  2012    Procedure:COLONOSCOPY; COLONOSCOPY; Surgeon:GINGER PARKS; Location: GI     COLONOSCOPY N/A 2017    Procedure: COLONOSCOPY;  colonoscopy;  Surgeon: Dez Siegel MD;  Location:  GI     CORONARY ANGIOGRAPHY ADULT ORDER  2014    3/18/14- Mild to moderate CAD, no interventions, treat medically.     ENT SURGERY  tonsils     GYN SURGERY  c sections     [facility]      S/P T&A     Z NONSPECIFIC PROCEDURE      LASER FIBROIDS     [facility] NONSPECIFIC PROCEDURE       x2       Social History   I have reviewed this patient's social history and updated it with pertinent information if needed.  Social History     Tobacco Use     Smoking status: Never Smoker      "Smokeless tobacco: Never Used   Substance Use Topics     Alcohol use: Yes     Comment: 1 glass wine/day     Drug use: No       Family History   I have reviewed this patient's family history and updated it with pertinent information if needed.  Family History   Problem Relation Age of Onset     C.A.D. Mother      Parkinsonism Mother 80     Breast Cancer Mother 80     Hypertension Mother      Osteoporosis Mother      Circulatory Maternal Grandmother         MVP     Alcohol/Drug Daughter 30     Cerebrovascular Disease Son 40        stroke, PFO     Circulatory Maternal Aunt         MVP     Breast Cancer Maternal Aunt 80     Breast Cancer Other         Maternal Aunt       Prior to Admission Medications   Prior to Admission Medications   Prescriptions Last Dose Informant Patient Reported? Taking?   Calcium Carbonate-Vitamin D (CALCIUM + D PO) 5/16/2022 at pm Self Yes Yes   Sig: Take 1 chew tab by mouth 2 times daily 1300 mg daily  (650 each)   NONFORMULARY 5/16/2022 Self Yes Yes   Sig: nutrafol   aspirin 81 MG tablet 5/17/2022 at am Self Yes Yes   Sig: Take 81 mg by mouth daily   atorvastatin (LIPITOR) 20 MG tablet 5/16/2022 at pm Self No Yes   Sig: Take 1 tablet (20 mg) by mouth daily   metoprolol tartrate (LOPRESSOR) 25 MG tablet 5/17/2022 at am Self No Yes   Sig: Take 1 tablet (25 mg) by mouth 2 times daily   omega-3 acid ethyl esters (LOVAZA) 1 g capsule 5/16/2022 Self Yes Yes   Sig: Take 2 g by mouth daily   tretinoin (RETIN-A) 0.025 % external cream 5/16/2022 Self Yes Yes   Sig: APPLY TO FACE EVERY DAY AT BEDTIME      Facility-Administered Medications: None     Allergies   Allergies   Allergen Reactions     Penicillins      \"severe migraines\"       Physical Exam   Vital Signs: Temp: 97.7  F (36.5  C) Temp src: Oral BP: 126/71 Pulse: 70   Resp: 16 SpO2: 96 % O2 Device: None (Room air)    Weight: 135 lbs 0 oz    General Appearance: NAD  Eyes: PERRLA, NO NYSTAGMUS  HEENT: NCAT  Respiratory: CTA  Cardiovascular: " RRR  GI: SOFT +BS  Lymph/Hematologic: def  Genitourinary: def  Skin: warm and perfused  Musculoskeletal: no edema  Neurologic: CN intact, moves all 4 ext  Psychiatric: pleasant and cooperative    Data   Data reviewed today: I reviewed all medications, new labs and imaging results over the last 24 hours. I personally reviewed no images or EKG's today.    Recent Labs   Lab 05/17/22  1615 05/17/22  0955   WBC  --  4.3   HGB  --  12.3   MCV  --  87   PLT  --  276   INR  --  0.99   NA  --  140   POTASSIUM 3.6 3.2*   CHLORIDE  --  106   CO2  --  26   BUN  --  18   CR  --  0.68   ANIONGAP  --  8   SHILPA  --  9.1   GLC  --  96

## 2022-05-18 NOTE — PLAN OF CARE
PT: Orders received, chart reviewed, discussed with patient. Pt admitted under observation status with strong dizziness that has now completely resolved. Pt is able to ambulate with SBA or independence and is moving safely. Pt denied any need for further assessment. Pt recommended to follow up with an OP vestibular clinic if symptoms return. Pt has no skilled PT needs at this time. PT to complete orders.

## 2022-05-18 NOTE — DISCHARGE SUMMARY
[facility]  Discharge Summary        Amaya Huff MRN# [id number]   YOB: 1950 Age: 71 year old     Date of Admission:  5/17/2022  Date of Discharge:  5/18/2022  Admitting Physician:  Dandy Evans MD  Discharge Physician: Dandy Evans MD  Discharging Service: Hospitalist     Primary Provider:  Carline Palm  Primary Care Physician Phone Number: [phone]         Discharge Diagnoses/Problem Oriented Hospital Course (Providers):    Amaya Huff was admitted on 5/17/2022 by Dandy Evans MD and I would refer you to their history and physical.  The following problems were addressed during her hospitalization:    Amaya Huff is a 71 year old female admitted on 5/17/2022. She has hx of CAD, MVP and woke up in the morning with vertiginous symptoms and a negative stroke work up including CTH, CTA, MRI but an incidental basilar tip aneurysm.     1. Vertigo  -- stroke work up negative including CTA, CTA, MRI  -- scheduled meclizine  -- PT for home safety     2. 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               Discharge Instructions and Follow-Up:      Follow-up Appointments     Follow-up and recommended labs and tests       Follow up with primary care provider, Carline Palm as needed                  Discharge Disposition:      Discharged to home         Discharge Medications:        Current Discharge Medication List      CONTINUE these medications which have NOT CHANGED    Details   aspirin 81 MG tablet Take 81 mg by mouth daily     "  atorvastatin (LIPITOR) 20 MG tablet Take 1 tablet (20 mg) by mouth daily  Qty: 90 tablet, Refills: 3    Associated Diagnoses: Hyperlipidemia LDL goal <70      Calcium Carbonate-Vitamin D (CALCIUM + D PO) Take 1 chew tab by mouth 2 times daily 1300 mg daily  (650 each)      metoprolol tartrate (LOPRESSOR) 25 MG tablet Take 1 tablet (25 mg) by mouth 2 times daily  Qty: 180 tablet, Refills: 3    Associated Diagnoses: Mild CAD      NONFORMULARY nutrafol      omega-3 acid ethyl esters (LOVAZA) 1 g capsule Take 2 g by mouth daily      tretinoin (RETIN-A) 0.025 % external cream APPLY TO FACE EVERY DAY AT BEDTIME                  Allergies:         Allergies   Allergen Reactions     Penicillins      \"severe migraines\"            Consultations This Hospital Stay:      Consultation during this admission received from neurosurgery          Discharge Orders       After Care Instructions     Activity      Your activity upon discharge: activity as tolerated         Diet      Follow this diet upon discharge: Orders Placed This Encounter      Regular Diet Adult                      Discharge Time:      Less than 30 minutes.        Image Results From This Hospital Stay (For Non-EPIC Providers):        Results for orders placed or performed during the hospital encounter of 05/17/22   Head CT w/o contrast    Narrative    CT HEAD WITHOUT CONTRAST 5/17/2022 10:39 AM    INDICATION: Dizziness, nonspecific.    TECHNIQUE: CT scan of the head without contrast. Dose reduction  techniques were used.  CONTRAST:  None.    COMPARISON: None.    FINDINGS: No intracranial hemorrhage, extraaxial collection, mass  effect or CT evidence of acute infarct.  Moderate presumed chronic  small vessel ischemic changes. Mild generalized volume loss. The  ventricles are proportional to the sulci. Trace calcification of the  distal internal carotid arteries bilaterally. Calvarium intact.  Unremarkable orbits. Paranasal sinuses are free of significant  disease. " Clear mastoid air cells.      Impression    IMPRESSION:  Age-related changes as above with no acute intracranial  abnormality.    SEAN TATE MD         SYSTEM ID:  [id number]   CTA Head Neck with Contrast    Narrative    CTA  HEAD NECK WITH CONTRAST 5/17/2022 10:43 AM    INDICATION: Dizziness, nonspecific. Stroke/TIA, assess intracranial  arteries. Stroke/TIA, assess extracranial arteries.    TECHNIQUE: Head and neck CT angiogram with IV contrast. CT images of  the head and neck vessels obtained during the arterial phase of  intravenous contrast administration. Axial helical 2D reconstructed  images and multiplanar 3D MIP reconstructed images of the head and  neck vessels were performed on a separate workstation. Dose reduction  techniques were used.  CONTRAST: 75 mL Isovue-[id number]    COMPARISON: None.     FINDINGS:  HEAD CTA: Bilateral distal internal carotid arteries, bilateral MCA,  and bilateral MOISES are patent. Distal vertebral arteries, basilar  artery, and bilateral PCA are patent. Superiorly projecting basilar  tip aneurysm measures 5 mm AP, 5 mm transverse, and 4 to 5 mm  craniocaudal.    NECK CTA: Three-vessel aortic arch. Mild narrowing at the proximal  right internal carotid artery. Mild narrowing at the left carotid  bifurcation and proximal left internal carotid artery. Vertebral  arteries are balanced and without significant stenosis. Osteopenia  with cervical spine degenerative change.      Impression    IMPRESSION:  HEAD CTA:  1.  No significant intracranial stenosis.    2.  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    NECK CTA:  1.  No high-grade stenosis of the neck vessels by NASCET criteria,  with mild narrowing at the proximal internal carotid arteries  bilaterally, along with mild narrowing at the left carotid  bifurcation.    Findings were discussed with Dr. Holli Gaspar via telephone at  1055  hours on 5/17/2022.     SEAN TATE MD         SYSTEM ID:  [id number]   CT Head Perfusion w Contrast    Narrative    CT BRAIN PERFUSION 5/17/2022 10:54 AM    COMPARISON: None.    HISTORY: Evaluate mismatch between penumbra and core infarct    TECHNIQUE: Time sequential axial CT images of the head were acquired  during the administration of intravenous contrast (50 mL Isovue-370).  CTA images of the [place] as well as color perfusion maps of  the brain were created from this time sequential axial source data.    FINDINGS: Although there is an elevated mean transit time and elevated  time to drain in the right occipital lobe, this is thought to be  artifactual in nature. No definite perfusion findings for recent large  vascular distribution infarct.      Impression    IMPRESSION: No definite perfusion findings to suggest a recent large  vascular distribution infarct.      Radiation dose for this scan was reduced using automated exposure  control, adjustment of the mA and/or kV according to patient size, or  iterative reconstruction technique    SEAN TATE MD         SYSTEM ID:  [id number]   MR Brain w/o & w Contrast    Narrative    MR BRAIN WITHOUT AND WITH CONTRAST 5/17/2022 1:01 PM    INDICATION: Neuro deficit, acute, stroke suspected. Dizziness,  nonspecific.    TECHNIQUE: Noncontrast and contrast enhanced MRI of the brain.  CONTRAST: 8 mL Gadavist    COMPARISON: 5/17/2022 head CT/head and neck CTA/CT perfusion.    FINDINGS: No finding for acute infarct, intracranial hemorrhage,  extra-axial collection, or abnormal parenchymal enhancement. Mild to  moderate burden of scattered chronic small vessel ischemic change.  Mild diffuse parenchymal volume loss. Nondilated ventricles. Major  intracranial vascular flow voids are preserved. Brainstem and  cerebellum are unremarkable. Cerebellar tonsils are normally  positioned. Mild mucosal thickening in the ethmoid air cells. No fluid  in the mastoid air  cells. Unremarkable orbits. Unremarkable sella.  Degenerative change partially visualized in the upper cervical spine.      Impression    IMPRESSION:   1.  No finding for acute infarct, intracranial hemorrhage, or  abnormally enhancing mass.    2.  Mild diffuse parenchymal volume loss with a mild to moderate  burden scattered chronic small vessel ischemic change.     SEAN TATE MD         SYSTEM ID:  [id number]           Most Recent Lab Results In EPIC (For Non-EPIC Providers):    Most Recent 3 CBC's:  Recent Labs   Lab Test 05/17/22  0955 10/01/21  0814 07/07/20  0818   WBC 4.3 7.1 4.4   HGB 12.3 12.8 12.4   MCV 87 90 89    282 248      Most Recent 3 BMP's:  Recent Labs   Lab Test 05/17/22  1615 05/17/22  0955 10/01/21  0814 07/07/20  0818   NA  --  140 141 140   POTASSIUM 3.6 3.2* 3.8 4.1   CHLORIDE  --  106 108 107   CO2  --  26 26 29   BUN  --  18 10 16   CR  --  0.68 0.75 0.81   ANIONGAP  --  8 7 4   SHILPA  --  9.1 9.7 9.8   GLC  --  96 86 94     Most Recent 3 Troponin's:No lab results found.    Invalid input(s): TROP, TROPONINIES  Most Recent 3 INR's:  Recent Labs   Lab Test 05/17/22  0955 03/18/14  0932   INR 0.99 0.96     Most Recent 2 LFT's:  Recent Labs   Lab Test 10/01/21  0814 07/07/20  0818   AST 20 26   ALT 26 34   ALKPHOS 63 68   BILITOTAL 0.6 0.6     Most Recent Cholesterol Panel:  Recent Labs   Lab Test 10/01/21  0814   CHOL 166   LDL 79   HDL 73   TRIG 70     Most Recent 6 Bacteria Isolates From Any Culture (See EPIC Reports for Culture Details):No lab results found.  Most Recent TSH, T4 and HgbA1c:  Recent Labs   Lab Test 10/01/21  0814   TSH 0.90

## 2022-05-18 NOTE — CONSULTS
[facility]    Neurosurgery Consultation     Date of Admission:  5/17/2022  Date of Consult (When I saw the patient): 05/18/22    Assessment & Plan   Amaya Huff is a 71 year old female who was admitted on 5/17/2022. Amaya Huff is a 71 year old female hx of HTN, CAD, MVP for which she is followed by cardiology, no prior hx of stroke or TIA presented on 5/17 with acute onset dizziness. Patient historian. Patient woke up with acute onset dizziness. She notes she attempted to drink water, sit down for awhile and shower without improvement of her symptoms. She notes she has never experienced this before. She had COVID diagnosed 4/24 and has had residual headaches since then. She denies worsening headaches, vision changes, weakness, paresthesias, n/v. Dizziness has since improved. Full work up negative for stroke with incidental findings of basilar tip aneurysm as stated below.     CTA  HEAD NECK WITH CONTRAST 5/17/2022 10:43 AM     INDICATION: Dizziness, nonspecific. Stroke/TIA, assess intracranial  arteries. Stroke/TIA, assess extracranial arteries.     TECHNIQUE: Head and neck CT angiogram with IV contrast. CT images of  the head and neck vessels obtained during the arterial phase of  intravenous contrast administration. Axial helical 2D reconstructed  images and multiplanar 3D MIP reconstructed images of the head and  neck vessels were performed on a separate workstation. Dose reduction  techniques were used.  CONTRAST: 75 mL Isovue-370     COMPARISON: None.      FINDINGS:  HEAD CTA: Bilateral distal internal carotid arteries, bilateral MCA,  and bilateral MOISES are patent. Distal vertebral arteries, basilar  artery, and bilateral PCA are patent. Superiorly projecting basilar  tip aneurysm measures 5 mm AP, 5 mm transverse, and 4 to 5 mm  craniocaudal.     NECK CTA: Three-vessel aortic arch. Mild narrowing at the proximal  right internal carotid artery. Mild narrowing at the left  carotid  bifurcation and proximal left internal carotid artery. Vertebral  arteries are balanced and without significant stenosis. Osteopenia  with cervical spine degenerative change.                                                                      IMPRESSION:  HEAD CTA:  1.  No significant intracranial stenosis.     2.  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     NECK CTA:  1.  No high-grade stenosis of the neck vessels by NASCET criteria,  with mild narrowing at the proximal internal carotid arteries  bilaterally, along with mild narrowing at the left carotid  bifurcation.    Clinical history, imaging and plans reviewed myself as well as with Dr. Joyner. Recommend outpatient follow up with [facility] endovascular Neurosurgery team. We will send referral. Red flag s/s reviewed with patient and recommend she present sooner should these occur. Dr. Joyner and patient in agreement with plans. Page or call with questions.       I have discussed the following assessment and plan with Dr. Joyner who is in agreement with the initial plan and will follow up with further consultation recommendations.    Avis Valladares PA-C  [facility] [place] [address]    Tel [phone]  Pager [phone]      Code Status    Full Code    Reason for Consult   Reason for consult: I was asked by  to evaluate this patient for incidental findings on imaging.    Primary Care Physician   Carline Palm    Chief Complaint   Dizziness     History is obtained from the patient    History of Present Illness   Amaya Huff is a 71 year old female hx of HTN, CAD, MVP for which she is followed by cardiology, no prior hx of stroke or TIA presented on 5/17 with acute onset dizziness. Patient historian. Patient woke up with acute onset dizziness. She notes she  attempted to drink water, sit down for awhile and shower without improvement of her symptoms. She notes she has never experienced this before. She had COVID diagnosed 4/24 and has had residual headaches since then. She denies worsening headaches, vision changes, weakness, paresthesias, n/v. Dizziness has since improved. Full work up negative for stroke with incidental findings of basilar tip aneurysm as stated below. She notes she has had cardiac work up by hospitalist since being in hospital due to cardiac history.     CTA  HEAD NECK WITH CONTRAST 5/17/2022 10:43 AM     INDICATION: Dizziness, nonspecific. Stroke/TIA, assess intracranial  arteries. Stroke/TIA, assess extracranial arteries.     TECHNIQUE: Head and neck CT angiogram with IV contrast. CT images of  the head and neck vessels obtained during the arterial phase of  intravenous contrast administration. Axial helical 2D reconstructed  images and multiplanar 3D MIP reconstructed images of the head and  neck vessels were performed on a separate workstation. Dose reduction  techniques were used.  CONTRAST: 75 mL Isovue-370     COMPARISON: None.      FINDINGS:  HEAD CTA: Bilateral distal internal carotid arteries, bilateral MCA,  and bilateral MOISES are patent. Distal vertebral arteries, basilar  artery, and bilateral PCA are patent. Superiorly projecting basilar  tip aneurysm measures 5 mm AP, 5 mm transverse, and 4 to 5 mm  craniocaudal.     NECK CTA: Three-vessel aortic arch. Mild narrowing at the proximal  right internal carotid artery. Mild narrowing at the left carotid  bifurcation and proximal left internal carotid artery. Vertebral  arteries are balanced and without significant stenosis. Osteopenia  with cervical spine degenerative change.                                                                      IMPRESSION:  HEAD CTA:  1.  No significant intracranial stenosis.     2.  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     NECK CTA:  1.  No high-grade stenosis of the neck vessels by NASCET criteria,  with mild narrowing at the proximal internal carotid arteries  bilaterally, along with mild narrowing at the left carotid  bifurcation.    Past Medical History   I have reviewed this patient's medical history and updated it with pertinent information if needed.   Past Medical History:   Diagnosis Date     Arthritis      Coronary artery disease     Mild to moderate CAD noted on 3/18/14 coronary angiogram      Heart palpitations      History of angina      Hyperlipidemia with target LDL less than 130 2015     Diagnosis updated by automated process. Provider to review and confirm.     Irregular heartbeat      Mitral valve disorders(424.0)      Unspecified essential hypertension        Past Surgical History   I have reviewed this patient's surgical history and updated it with pertinent information if needed.  Past Surgical History:   Procedure Laterality Date     ABDOMEN SURGERY  , ,    2 C-sections 1 laproscope for fibroids     COLONOSCOPY  2012    Procedure:COLONOSCOPY; COLONOSCOPY; Surgeon:GINGER PARKS; Location: GI     COLONOSCOPY N/A 2017    Procedure: COLONOSCOPY;  colonoscopy;  Surgeon: Dez Siegel MD;  Location:  GI     CORONARY ANGIOGRAPHY ADULT ORDER  2014    3/18/14- Mild to moderate CAD, no interventions, treat medically.     ENT SURGERY  tonsils     GYN SURGERY  c sections     [facility] NONSPECIFIC PROCEDURE      S/P T&A     Z NONSPECIFIC PROCEDURE      LASER FIBROIDS     [facility] NONSPECIFIC PROCEDURE       x2       Prior to Admission Medications   Prior to Admission Medications   Prescriptions Last Dose Informant Patient Reported? Taking?   Calcium Carbonate-Vitamin D (CALCIUM + D PO) 2022 at pm Self Yes Yes   Sig: Take 1 chew tab by mouth 2 times daily 1300 mg daily  (650  "each)   NONFORMULARY 5/16/2022 Self Yes Yes   Sig: nutrafol   aspirin 81 MG tablet 5/17/2022 at am Self Yes Yes   Sig: Take 81 mg by mouth daily   atorvastatin (LIPITOR) 20 MG tablet 5/16/2022 at pm Self No Yes   Sig: Take 1 tablet (20 mg) by mouth daily   metoprolol tartrate (LOPRESSOR) 25 MG tablet 5/17/2022 at am Self No Yes   Sig: Take 1 tablet (25 mg) by mouth 2 times daily   omega-3 acid ethyl esters (LOVAZA) 1 g capsule 5/16/2022 Self Yes Yes   Sig: Take 2 g by mouth daily   tretinoin (RETIN-A) 0.025 % external cream 5/16/2022 Self Yes Yes   Sig: APPLY TO FACE EVERY DAY AT BEDTIME      Facility-Administered Medications: None     Allergies   Allergies   Allergen Reactions     Penicillins      \"severe migraines\"       Social History   I have reviewed this patient's social history and updated it with pertinent information if needed. Amaya Huff  reports that she has never smoked. She has never used smokeless tobacco. She reports current alcohol use. She reports that she does not use drugs.    Family History   I have reviewed this patient's family history and updated it with pertinent information if needed.   Family History   Problem Relation Age of Onset     C.A.D. Mother      Parkinsonism Mother 80     Breast Cancer Mother 80     Hypertension Mother      Osteoporosis Mother      Circulatory Maternal Grandmother         MVP     Alcohol/Drug Daughter 30     Cerebrovascular Disease Son 40        stroke, PFO     Circulatory Maternal Aunt         MVP     Breast Cancer Maternal Aunt 80     Breast Cancer Other         Maternal Aunt       Review of Systems    ROS: 10 point ROS neg other than the symptoms noted above in the HPI.    Physical Exam   Temp: 98  F (36.7  C) Temp src: Oral BP: 136/79 Pulse: 59   Resp: 16 SpO2: 97 % O2 Device: None (Room air)    Vital Signs with Ranges  Temp:  [97.2  F (36.2  C)-98.5  F (36.9  C)] 98  F (36.7  C)  Pulse:  [59-70] 59  Resp:  [11-18] 16  BP: (112-142)/(64-84) " "136/79  SpO2:  [96 %-99 %] 97 %  135 lbs 0 oz     , Blood pressure 136/79, pulse 59, temperature 98  F (36.7  C), temperature source Oral, resp. rate 16, height 5' 6\" (1.676 m), weight 135 lb (61.2 kg), SpO2 97 %, not currently breastfeeding.  135 lbs 0 oz    NEUROLOGICAL EXAMINATION:   Mental status:  Alert and Oriented x 3, speech is fluent.  Cranial nerves:  II-XII grossly intact.   Motor:  Strength is 5/5 throughout the upper and lower extremities  Shoulder Abduction:  Right:  5/5   Left:  5/5  Biceps:                      Right:  5/5   Left:  5/5  Triceps:                     Right:  5/5   Left:  5/5  Wrist Extensors:       Right:  5/5   Left:  5/5  Wrist Flexors:           Right:  5/5   Left:  5/5  interosseus :            Right:  5/5   Left:  5/5   Hip Flexor:                Right: 5/5  Left:  5/5  Hip Adductor:             Right:  5/5  Left:  5/5  Hip Abductor:             Right:  5/5  Left:  5/5  Gastroc Soleus:        Right:  5/5  Left:  5/5  Tib/Ant:                      Right:  5/5  Left:  5/5  EHL:                     Right:  5/5  Left:  5/5  Sensation:  intact  Reflexes:   Negative Clonus.    Coordination:  Smooth finger to nose testing.   Negative pronator drift.      Data   All new lab and imaging data was personally reviewed by me.  CT:  CTA  HEAD NECK WITH CONTRAST 5/17/2022 10:43 AM     INDICATION: Dizziness, nonspecific. Stroke/TIA, assess intracranial  arteries. Stroke/TIA, assess extracranial arteries.     TECHNIQUE: Head and neck CT angiogram with IV contrast. CT images of  the head and neck vessels obtained during the arterial phase of  intravenous contrast administration. Axial helical 2D reconstructed  images and multiplanar 3D MIP reconstructed images of the head and  neck vessels were performed on a separate workstation. Dose reduction  techniques were used.  CONTRAST: 75 mL Isovue-370     COMPARISON: None.      FINDINGS:  HEAD CTA: Bilateral distal internal carotid arteries, bilateral " MCA,  and bilateral MOISES are patent. Distal vertebral arteries, basilar  artery, and bilateral PCA are patent. Superiorly projecting basilar  tip aneurysm measures 5 mm AP, 5 mm transverse, and 4 to 5 mm  craniocaudal.     NECK CTA: Three-vessel aortic arch. Mild narrowing at the proximal  right internal carotid artery. Mild narrowing at the left carotid  bifurcation and proximal left internal carotid artery. Vertebral  arteries are balanced and without significant stenosis. Osteopenia  with cervical spine degenerative change.                                                                      IMPRESSION:  HEAD CTA:  1.  No significant intracranial stenosis.     2.  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     NECK CTA:  1.  No high-grade stenosis of the neck vessels by NASCET criteria,  with mild narrowing at the proximal internal carotid arteries  bilaterally, along with mild narrowing at the left carotid  bifurcation.  MRI:  MR BRAIN WITHOUT AND WITH CONTRAST 5/17/2022 1:01 PM     INDICATION: Neuro deficit, acute, stroke suspected. Dizziness,  nonspecific.     TECHNIQUE: Noncontrast and contrast enhanced MRI of the brain.  CONTRAST: 8 mL Gadavist     COMPARISON: 5/17/2022 head CT/head and neck CTA/CT perfusion.     FINDINGS: No finding for acute infarct, intracranial hemorrhage,  extra-axial collection, or abnormal parenchymal enhancement. Mild to  moderate burden of scattered chronic small vessel ischemic change.  Mild diffuse parenchymal volume loss. Nondilated ventricles. Major  intracranial vascular flow voids are preserved. Brainstem and  cerebellum are unremarkable. Cerebellar tonsils are normally  positioned. Mild mucosal thickening in the ethmoid air cells. No fluid  in the mastoid air cells. Unremarkable orbits. Unremarkable sella.  Degenerative change partially visualized in the upper cervical  spine.                                                                      IMPRESSION:   1.  No finding for acute infarct, intracranial hemorrhage, or  abnormally enhancing mass.     2.  Mild diffuse parenchymal volume loss with a mild to moderate  burden scattered chronic small vessel ischemic change.      SEAN TATE MD   CBC RESULTS:   Recent Labs   Lab Test 05/17/22  0955   WBC 4.3   RBC 4.41   HGB 12.3   HCT 38.2   MCV 87   MCH 27.9   MCHC 32.2   RDW 12.4        Basic Metabolic Panel:  Lab Results   Component Value Date     05/17/2022     07/07/2020      Lab Results   Component Value Date    POTASSIUM 3.6 05/17/2022    POTASSIUM 4.1 07/07/2020     Lab Results   Component Value Date    CHLORIDE 106 05/17/2022    CHLORIDE 107 07/07/2020     Lab Results   Component Value Date    SHILPA 9.1 05/17/2022    SHILPA 9.8 07/07/2020     Lab Results   Component Value Date    CO2 26 05/17/2022    CO2 29 07/07/2020     Lab Results   Component Value Date    BUN 18 05/17/2022    BUN 16 07/07/2020     Lab Results   Component Value Date    CR 0.68 05/17/2022    CR 0.81 07/07/2020     Lab Results   Component Value Date    GLC 96 05/17/2022    GLC 94 07/07/2020     INR:  Lab Results   Component Value Date    INR 0.99 05/17/2022    INR 0.96 03/18/2014

## 2022-05-18 NOTE — PROGRESS NOTES
Observation goals  PRIOR TO DISCHARGE        Comments: Resolution of vertigo - Denies diziness  Home safety - met  Able to tolerate diet- met     Orientation/Cognitive: AOX4  Observation Goals (Met/ Not Met): Met  Mobility Level/Assist Equipment: Ind  Fall Risk (Y/N): No  Behavior Concerns: none  Pain Management: Denies  Tele/VS/O2: VSS on RA  ABNL Lab/BG: None  Diet: Reg  Bowel/Bladder: Continent  Skin Concerns: None  Drains/Devices: No IV  Tests/Procedures for next shift: None  Anticipated DC date & active delays: Discharged at 1650  Patient Stated Goal for Today: N/A    AVS printed and explained. Discharge medication to  at [facility]. Explained about Outpatient f/u with [facility] endovascular and neurosurgery clinic & given # [phone] incase to call if not received call back within 2 days.

## 2022-05-18 NOTE — DISCHARGE SUMMARY
[facility]  Discharge Summary        Amaya Huff MRN# [id number]   YOB: 1950 Age: 71 year old     Date of Admission:  5/17/2022  Date of Discharge:  5/18/2022  Admitting Physician:  Dandy Evans MD  Discharge Physician: Dandy Evans MD  Discharging Service: Hospitalist     Primary Provider:  Carline Palm  Primary Care Physician Phone Number: [phone]         Discharge Diagnoses/Problem Oriented Hospital Course (Providers):    Amaya Huff was admitted on 5/17/2022 by Dandy Evans MD and I would refer you to their history and physical.  The following problems were addressed during her hospitalization:    Amaya Huff is a 71 year old female admitted on 5/17/2022. She has hx of CAD, MVP and woke up in the morning with vertiginous symptoms and a negative stroke work up including CTH, CTA, MRI but an incidental basilar tip aneurysm.     1. Vertigo  -- stroke work up negative including CTA, CTA, MRI  -- scheduled meclizine  -- PT for home safety     2. 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               Discharge Instructions and Follow-Up:      Follow-up Appointments     Follow-up and recommended labs and tests       Follow up with primary care provider, Carline Palm as needed                  Discharge Disposition:      Discharged to home         Discharge Medications:        Current Discharge Medication List      START taking these medications    Details   meclizine (ANTIVERT) 25 MG tablet Take 1 tablet (25 mg) by mouth 4 times  "daily  Qty: 10 tablet, Refills: 0    Associated Diagnoses: Dizziness         CONTINUE these medications which have NOT CHANGED    Details   aspirin 81 MG tablet Take 81 mg by mouth daily      atorvastatin (LIPITOR) 20 MG tablet Take 1 tablet (20 mg) by mouth daily  Qty: 90 tablet, Refills: 3    Associated Diagnoses: Hyperlipidemia LDL goal <70      Calcium Carbonate-Vitamin D (CALCIUM + D PO) Take 1 chew tab by mouth 2 times daily 1300 mg daily  (650 each)      metoprolol tartrate (LOPRESSOR) 25 MG tablet Take 1 tablet (25 mg) by mouth 2 times daily  Qty: 180 tablet, Refills: 3    Associated Diagnoses: Mild CAD      NONFORMULARY nutrafol      omega-3 acid ethyl esters (LOVAZA) 1 g capsule Take 2 g by mouth daily      tretinoin (RETIN-A) 0.025 % external cream APPLY TO FACE EVERY DAY AT BEDTIME                  Allergies:         Allergies   Allergen Reactions     Penicillins      \"severe migraines\"            Consultations This Hospital Stay:      Consultation during this admission received from neurosurgery          Discharge Orders       After Care Instructions     Activity      Your activity upon discharge: activity as tolerated         Diet      Follow this diet upon discharge: Orders Placed This Encounter      Regular Diet Adult                      Discharge Time:      Less than 30 minutes.        Image Results From This Hospital Stay (For Non-EPIC Providers):        Results for orders placed or performed during the hospital encounter of 05/17/22   Head CT w/o contrast    Narrative    CT HEAD WITHOUT CONTRAST 5/17/2022 10:39 AM    INDICATION: Dizziness, nonspecific.    TECHNIQUE: CT scan of the head without contrast. Dose reduction  techniques were used.  CONTRAST:  None.    COMPARISON: None.    FINDINGS: No intracranial hemorrhage, extraaxial collection, mass  effect or CT evidence of acute infarct.  Moderate presumed chronic  small vessel ischemic changes. Mild generalized volume loss. The  ventricles are " proportional to the sulci. Trace calcification of the  distal internal carotid arteries bilaterally. Calvarium intact.  Unremarkable orbits. Paranasal sinuses are free of significant  disease. Clear mastoid air cells.      Impression    IMPRESSION:  Age-related changes as above with no acute intracranial  abnormality.    SEAN TATE MD         SYSTEM ID:  [id number]   CTA Head Neck with Contrast    Narrative    CTA  HEAD NECK WITH CONTRAST 5/17/2022 10:43 AM    INDICATION: Dizziness, nonspecific. Stroke/TIA, assess intracranial  arteries. Stroke/TIA, assess extracranial arteries.    TECHNIQUE: Head and neck CT angiogram with IV contrast. CT images of  the head and neck vessels obtained during the arterial phase of  intravenous contrast administration. Axial helical 2D reconstructed  images and multiplanar 3D MIP reconstructed images of the head and  neck vessels were performed on a separate workstation. Dose reduction  techniques were used.  CONTRAST: 75 mL Isovue-370    COMPARISON: None.     FINDINGS:  HEAD CTA: Bilateral distal internal carotid arteries, bilateral MCA,  and bilateral MOISES are patent. Distal vertebral arteries, basilar  artery, and bilateral PCA are patent. Superiorly projecting basilar  tip aneurysm measures 5 mm AP, 5 mm transverse, and 4 to 5 mm  craniocaudal.    NECK CTA: Three-vessel aortic arch. Mild narrowing at the proximal  right internal carotid artery. Mild narrowing at the left carotid  bifurcation and proximal left internal carotid artery. Vertebral  arteries are balanced and without significant stenosis. Osteopenia  with cervical spine degenerative change.      Impression    IMPRESSION:  HEAD CTA:  1.  No significant intracranial stenosis.    2.  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    NECK CTA:  1.  No high-grade stenosis of the neck vessels by NASCET criteria,  with mild  narrowing at the proximal internal carotid arteries  bilaterally, along with mild narrowing at the left carotid  bifurcation.    Findings were discussed with Dr. Holli Gaspar via telephone at 1055  hours on 5/17/2022.     SEAN TATE MD         SYSTEM ID:  [id number]   CT Head Perfusion w Contrast    Narrative    CT BRAIN PERFUSION 5/17/2022 10:54 AM    COMPARISON: None.    HISTORY: Evaluate mismatch between penumbra and core infarct    TECHNIQUE: Time sequential axial CT images of the head were acquired  during the administration of intravenous contrast (50 mL Isovue-370).  CTA images of the [place] as well as color perfusion maps of  the brain were created from this time sequential axial source data.    FINDINGS: Although there is an elevated mean transit time and elevated  time to drain in the right occipital lobe, this is thought to be  artifactual in nature. No definite perfusion findings for recent large  vascular distribution infarct.      Impression    IMPRESSION: No definite perfusion findings to suggest a recent large  vascular distribution infarct.      Radiation dose for this scan was reduced using automated exposure  control, adjustment of the mA and/or kV according to patient size, or  iterative reconstruction technique    SEAN TATE MD         SYSTEM ID:  [id number]   MR Brain w/o & w Contrast    Narrative    MR BRAIN WITHOUT AND WITH CONTRAST 5/17/2022 1:01 PM    INDICATION: Neuro deficit, acute, stroke suspected. Dizziness,  nonspecific.    TECHNIQUE: Noncontrast and contrast enhanced MRI of the brain.  CONTRAST: 8 mL Gadavist    COMPARISON: 5/17/2022 head CT/head and neck CTA/CT perfusion.    FINDINGS: No finding for acute infarct, intracranial hemorrhage,  extra-axial collection, or abnormal parenchymal enhancement. Mild to  moderate burden of scattered chronic small vessel ischemic change.  Mild diffuse parenchymal volume loss. Nondilated ventricles. Major  intracranial vascular flow  voids are preserved. Brainstem and  cerebellum are unremarkable. Cerebellar tonsils are normally  positioned. Mild mucosal thickening in the ethmoid air cells. No fluid  in the mastoid air cells. Unremarkable orbits. Unremarkable sella.  Degenerative change partially visualized in the upper cervical spine.      Impression    IMPRESSION:   1.  No finding for acute infarct, intracranial hemorrhage, or  abnormally enhancing mass.    2.  Mild diffuse parenchymal volume loss with a mild to moderate  burden scattered chronic small vessel ischemic change.     SEAN TATE MD         SYSTEM ID:  [id number]           Most Recent Lab Results In EPIC (For Non-EPIC Providers):    Most Recent 3 CBC's:  Recent Labs   Lab Test 05/17/22  0955 10/01/21  0814 07/07/20  0818   WBC 4.3 7.1 4.4   HGB 12.3 12.8 12.4   MCV 87 90 89    282 248      Most Recent 3 BMP's:  Recent Labs   Lab Test 05/17/22  1615 05/17/22  0955 10/01/21  0814 07/07/20  0818   NA  --  140 141 140   POTASSIUM 3.6 3.2* 3.8 4.1   CHLORIDE  --  106 108 107   CO2  --  26 26 29   BUN  --  18 10 16   CR  --  0.68 0.75 0.81   ANIONGAP  --  8 7 4   SHILPA  --  9.1 9.7 9.8   GLC  --  96 86 94     Most Recent 3 Troponin's:No lab results found.    Invalid input(s): TROP, TROPONINIES  Most Recent 3 INR's:  Recent Labs   Lab Test 05/17/22  0955 03/18/14  0932   INR 0.99 0.96     Most Recent 2 LFT's:  Recent Labs   Lab Test 10/01/21  0814 07/07/20  0818   AST 20 26   ALT 26 34   ALKPHOS 63 68   BILITOTAL 0.6 0.6     Most Recent Cholesterol Panel:  Recent Labs   Lab Test 10/01/21  0814   CHOL 166   LDL 79   HDL 73   TRIG 70     Most Recent 6 Bacteria Isolates From Any Culture (See EPIC Reports for Culture Details):No lab results found.  Most Recent TSH, T4 and HgbA1c:  Recent Labs   Lab Test 10/01/21  0814   TSH 0.90

## 2022-05-18 NOTE — PROGRESS NOTES
"Per Avis Valladares PA-C, place Endovascular referral with Dr. Zepeda for \"Superiorly projecting saccular aneurysm arising from the basilar tip measures approximately 5 mm AP, 5 mm transverse, and 4 to 5 mm Craniocaudal\"    Referral placed.   "

## 2022-05-18 NOTE — PLAN OF CARE
Orientation/Cognitive: AOX4  Observation Goals (Met/ Not Met): Not met  Mobility Level/Assist Equipment: SBA  Fall Risk (Y/N): No   Behavior Concerns: none  Pain Management: denies pain   Tele/VS/O2: Tele NSR, VSS on room air  ABNL Lab/BG: see results   Diet: Regular   Bowel/Bladder: n/a  Skin Concerns: none  Drains/Devices: PIV SL  Tests/Procedures for next shift: PT pending  Anticipated DC date & active delays: 5/18, PT pending   Patient Stated Goal for Today: rest     Observation Goals  Resolution of vertigo: Met   Home safety: Not Met    Able to tolerate diet:Met

## 2022-05-18 NOTE — PLAN OF CARE
A/Ox4. VSS on RA. Denies pain and dizziness. Up IND. PT evaluated pt. PIV's removed. Plan to discharge home today once [facility] endovascular neurosurgery referral is placed. Call to Neurosurgery clinic at 1400 to see where the referral is at so pt can discharge, they stated that they would start working on it right away.

## 2022-05-18 NOTE — PROVIDER NOTIFICATION
MD Notification    Notified Person: MD    Notified Person Name: Avis Valladares PA-C    Notification Date/Time: 5/18/22 1105     Notification Interaction: [employer]    Purpose of Notification: Neurosurg consult placed for incidental basilar tip aneurysm. Here with vertigo. Pt cleared by [facility] for d/c once seen by neurosurg. Possible to see early this afternoon?     Orders Received:    Comments:

## 2022-05-18 NOTE — PLAN OF CARE
PT: Orders received, chart reviewed, discussed with patient. Pt admitted under observation status with dizziness and lightheadedness. Pt reports she is feeling much better today, denies dizziness is is back to baseline. Has ambulated to the bathroom and in the room without difficulty. Pt has no skilled PT needs at this time. PT to complete orders.

## 2022-05-20 ENCOUNTER — PATIENT OUTREACH (OUTPATIENT)
Dept: FAMILY MEDICINE | Facility: CLINIC | Age: 72
End: 2022-05-20
Payer: MEDICARE

## 2022-05-20 NOTE — TELEPHONE ENCOUNTER
What type of discharge? Observation  Risk of Hospital admission or ED visit: 11%  Is a TCM episode required? Yes  When should the patient follow up with PCP? 14 days of discharge.    Juan Martinez RN  [facility]

## 2022-05-24 NOTE — TELEPHONE ENCOUNTER
FUTURE VISIT INFORMATION      FUTURE VISIT INFORMATION:    Date: 6/16/2022    Time: 1130am    Location: [facility] [place], [place]  REFERRAL INFORMATION:    Referring provider:  Dr. Evans    Referring providers clinic:  [facility]     Reason for visit/diagnosis  Anuerysm     RECORDS REQUESTED FROM:       Clinic name Comments Records Status Imaging Status   Internal ED Visit/Admisson-5/17/2022    CTA Head/Neck-5/17/2022    MR Brain-5/17/2022 Epic PACS

## 2022-05-25 NOTE — TELEPHONE ENCOUNTER
"Hospital/TCU/ED for chronic condition Discharge Protocol    \"Hi, my name is Amaya Deutsch RN, a registered nurse, and I am calling from [facility].  I am calling to follow up and see how things are going for you after your recent emergency visit/hospital/TCU stay.\"    Tell me how you are doing now that you are home?\" Well.       Discharge Instructions    \"Let's review your discharge instructions.  What is/are the follow-up recommendations?  Pt. Response: , She is doing well.     \"Has an appointment with your primary care provider been scheduled?\"   Yes. (confirm)    \"When you see the provider, I would recommend that you bring your medications with you.\"    Medications    \"Tell me what changed about your medicines when you discharged?\"    Changes to chronic meds?    0-1    \"What questions do you have about your medications?\"    None     New diagnoses of heart failure, COPD, diabetes, or MI?    No              Post Discharge Medication Reconciliation Status: discharge medications reconciled, continue medications without change.    Was MTM referral placed (*Make sure to put transitions as reason for referral)?   No    Call Summary    \"What questions or concerns do you have about your recent visit and your follow-up care?\"     none    \"If you have questions or things don't continue to improve, we encourage you contact us through the main clinic number (give number).  Even if the clinic is not open, triage nurses are available 24/7 to help you.     We would like you to know that our clinic has extended hours (provide information).  We also have urgent care (provide details on closest location and hours/contact info)\"      \"Thank you for your time and take care!\"      Amaya Deutsch RN  [facility]              "

## 2022-05-25 NOTE — TELEPHONE ENCOUNTER
ED / Discharge Outreach Protocol    Patient Contact    Attempt # 1    Was call answered?  No.  Left message on voicemail with information to call me back.    Patient does have hospital follow up scheduled  6/2/2022 8:30 AM (Arrive by 8:15 AM) Carline Palm MD [facility]       Yoni Gordon RN  [facility]

## 2022-06-02 ENCOUNTER — OFFICE VISIT (OUTPATIENT)
Dept: FAMILY MEDICINE | Facility: CLINIC | Age: 72
End: 2022-06-02
Payer: COMMERCIAL

## 2022-06-02 VITALS
SYSTOLIC BLOOD PRESSURE: 138 MMHG | HEIGHT: 66 IN | RESPIRATION RATE: 16 BRPM | BODY MASS INDEX: 21.86 KG/M2 | WEIGHT: 136 LBS | OXYGEN SATURATION: 99 % | HEART RATE: 70 BPM | DIASTOLIC BLOOD PRESSURE: 82 MMHG | TEMPERATURE: 97.1 F

## 2022-06-02 DIAGNOSIS — I10 PRIMARY HYPERTENSION: ICD-10-CM

## 2022-06-02 DIAGNOSIS — G45.9 TIA (TRANSIENT ISCHEMIC ATTACK): ICD-10-CM

## 2022-06-02 DIAGNOSIS — E78.5 HYPERLIPIDEMIA LDL GOAL <70: ICD-10-CM

## 2022-06-02 DIAGNOSIS — R42 VERTIGO: ICD-10-CM

## 2022-06-02 DIAGNOSIS — I67.1 BRAIN ANEURYSM: ICD-10-CM

## 2022-06-02 DIAGNOSIS — I25.10 MILD CAD: Primary | ICD-10-CM

## 2022-06-02 DIAGNOSIS — J00 ACUTE NASOPHARYNGITIS: ICD-10-CM

## 2022-06-02 LAB — SARS-COV-2 RNA RESP QL NAA+PROBE: NEGATIVE

## 2022-06-02 PROCEDURE — 99495 TRANSJ CARE MGMT MOD F2F 14D: CPT | Performed by: INTERNAL MEDICINE

## 2022-06-02 PROCEDURE — U0003 INFECTIOUS AGENT DETECTION BY NUCLEIC ACID (DNA OR RNA); SEVERE ACUTE RESPIRATORY SYNDROME CORONAVIRUS 2 (SARS-COV-2) (CORONAVIRUS DISEASE [COVID-19]), AMPLIFIED PROBE TECHNIQUE, MAKING USE OF HIGH THROUGHPUT TECHNOLOGIES AS DESCRIBED BY CMS-2020-01-R: HCPCS | Performed by: INTERNAL MEDICINE

## 2022-06-02 PROCEDURE — U0005 INFEC AGEN DETEC AMPLI PROBE: HCPCS | Performed by: INTERNAL MEDICINE

## 2022-06-02 RX ORDER — LOSARTAN POTASSIUM 50 MG/1
50 TABLET ORAL AT BEDTIME
Qty: 90 TABLET | Refills: 3 | Status: SHIPPED | OUTPATIENT
Start: 2022-06-02 | End: 2022-08-19

## 2022-06-02 RX ORDER — METOPROLOL TARTRATE 25 MG/1
25 TABLET, FILM COATED ORAL 2 TIMES DAILY
Qty: 180 TABLET | Refills: 3 | Status: SHIPPED | OUTPATIENT
Start: 2022-06-02 | End: 2022-08-19

## 2022-06-02 ASSESSMENT — PAIN SCALES - GENERAL: PAINLEVEL: NO PAIN (0)

## 2022-06-02 NOTE — PROGRESS NOTES
Assessment & Plan     Mild CAD  Patient is on aspirin and statins and for minor procedures should not hold  - metoprolol tartrate (LOPRESSOR) 25 MG tablet  Dispense: 180 tablet; Refill: 3    Vertigo  This was the main reason for admission but with the facial symptoms this could be a TIA    We will do event monitor  CT angiography was discussed with her as below  *    Hyperlipidemia LDL goal <70  Patient is on Lipitor  LDL Cholesterol Calculated   Date Value Ref Range Status   10/01/2021 79 <=100 mg/dL Final   07/07/2020 87 <100 mg/dL Final     Comment:     Desirable:       <100 mg/dl       Current Outpatient Medications   Medication     aspirin 81 MG tablet     atorvastatin (LIPITOR) 20 MG tablet     Calcium Carbonate-Vitamin D (CALCIUM + D PO)     losartan (COZAAR) 50 MG tablet     metoprolol tartrate (LOPRESSOR) 25 MG tablet     NONFORMULARY     omega-3 acid ethyl esters (LOVAZA) 1 g capsule     tretinoin (RETIN-A) 0.025 % external cream     No current facility-administered medications for this visit.         Brain aneurysm  This is under 8 mm basilar aneurysm and patient has neurosurgery appointment  As she had the films with the patient    Primary hypertension  We will begin losartan  - losartan (COZAAR) 50 MG tablet  Dispense: 90 tablet; Refill: 3    TIA (transient ischemic attack)  As above we will add losartan, continue aspirin and statins  - Adult Cardiac Event Monitor        Acute nasopharyngitis  COVID test done at home is normal and she had COVID a month ago  - Symptomatic; Yes; 5/28/2022 COVID-19 Virus (Coronavirus) by PCR Nasopharyngeal  - Symptomatic; Yes; 5/28/2022 COVID-19 Virus (Coronavirus) by PCR Nasopharyngeal                   Return in about 6 months (around 12/2/2022) for Physical Exam.    Carline Palm MD  [facility] CASSY Conde is a 71 year old who presents for the following health issues     HPI   Patient is here for hospital follow-up  On the  17th,  Patient woke up with acute onset dizziness. She notes she attempted to drink water, sit down for awhile and shower without improvement of her symptoms. She notes she has never experienced this before. She had COVID diagnosed 4/24 and has had residual headaches since then. She denies worsening headaches, vision changes, weakness, paresthesias, n/v. Dizziness improved after few hours. Full work up negative for stroke with incidental findings of basilar tip aneurysm as stated below.   She was noticed to have right facial droop by the ER nurse which was noticed by her and her  also    She was seen by neurology and neurosurgery  She has a cold currently and COVID testing done twice at home is normal    She has a hand surgery tomorrow  Hospital Follow-up Visit:    Hospital/Nursing Home/IP Rehab Facility: [facility]  Date of Admission: 05/17/2022  Date of Discharge: 05/18/2022  Reason(s) for Admission:      Dizziness     Facial paresthesia     Chronic intractable headache, unspecified headache type     Chest pain, unspecified type      Amaya Huff is a 71 year old female admitted on 5/17/2022. She has hx of CAD, MVP and woke up in the morning with vertiginous symptoms and a negative stroke work up including CTH, CTA, MRI but an incidental basilar tip aneurysm.     1. Vertigo  -- stroke work up negative including CTA, CTA, MRI  -- scheduled meclizine  -- PT for home safety     2. Mild Chest pain  ASCVD  -- associated with elevated bp  -- trop negative  -- continue metop and lipitor     3. Basilar tip aneurysm  -- incidental finding  -- neurosurgery consult and likely outpatient surveillance    IMPRESSION:  HEAD CTA:  1.  No significant intracranial stenosis.     2.  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 No   Problems taking  "medications regularly:  None  Medication changes since discharge: None  Problems adhering to non-medication therapy:  None    Summary of hospitalization:  [facility] discharge summary reviewed  Diagnostic Tests/Treatments reviewed.  Follow up needed: none  Other Healthcare Providers Involved in Patient s Care:         None  Update since discharge: improved.       Post Discharge Medication Reconciliation: discharge medications reconciled, continue medications without change.  Plan of care communicated with patient                Review of Systems   10 point ROS of systems including Constitutional, Eyes, Respiratory, Cardiovascular, Gastroenterology, Genitourinary, Integumentary, Muscularskeletal, Psychiatric were all negative except for pertinent positives noted in my HPI.        Objective    /82   Pulse 70   Temp 97.1  F (36.2  C) (Temporal)   Resp 16   Ht 1.676 m (5' 6\")   Wt 61.7 kg (136 lb)   SpO2 99%   BMI 21.95 kg/m    Body mass index is 21.95 kg/m .  Physical Exam   GENERAL: healthy, alert and no distress  NECK: no adenopathy, no asymmetry, masses, or scars and thyroid normal to palpation  RESP: lungs clear to auscultation - no rales, rhonchi or wheezes  CV: regular rate and rhythm, normal S1 S2, no S3 or S4, no murmur, click or rub, no peripheral edema and peripheral pulses strong  NEURO: Normal strength and tone, mentation intact and speech normal  PSYCH: mentation appears normal, affect normal/bright    Patient Active Problem List   Diagnosis     Abdominal pain     Premature beats     Mitral valve disorder     Advanced directives, counseling/discussion     Mild CAD     SBO (small bowel obstruction) (H)     Family history of malignant neoplasm of breast     Disorder of bone and cartilage     Acute cystitis without hematuria     Urinary problem     Kidney stone     Dizziness     Facial paresthesia     Chronic intractable headache, unspecified headache type     Chest pain, " unspecified type     Current Outpatient Medications   Medication     aspirin 81 MG tablet     atorvastatin (LIPITOR) 20 MG tablet     Calcium Carbonate-Vitamin D (CALCIUM + D PO)     losartan (COZAAR) 50 MG tablet     metoprolol tartrate (LOPRESSOR) 25 MG tablet     NONFORMULARY     omega-3 acid ethyl esters (LOVAZA) 1 g capsule     tretinoin (RETIN-A) 0.025 % external cream     No current facility-administered medications for this visit.

## 2022-06-13 ENCOUNTER — TRANSFERRED RECORDS (OUTPATIENT)
Dept: HEALTH INFORMATION MANAGEMENT | Facility: CLINIC | Age: 72
End: 2022-06-13

## 2022-06-16 ENCOUNTER — PRE VISIT (OUTPATIENT)
Dept: NEUROSURGERY | Facility: CLINIC | Age: 72
End: 2022-06-16
Payer: MEDICARE

## 2022-06-16 ENCOUNTER — VIRTUAL VISIT (OUTPATIENT)
Dept: NEUROSURGERY | Facility: CLINIC | Age: 72
End: 2022-06-16
Payer: COMMERCIAL

## 2022-06-16 DIAGNOSIS — I67.1 CEREBRAL ANEURYSM, NONRUPTURED: Primary | ICD-10-CM

## 2022-06-16 DIAGNOSIS — I72.9 ANEURYSM (H): ICD-10-CM

## 2022-06-16 PROCEDURE — 99443 PR PHYSICIAN TELEPHONE EVALUATION 21-30 MIN: CPT | Mod: 95 | Performed by: NEUROLOGICAL SURGERY

## 2022-06-16 NOTE — LETTER
"2022       RE: Amaya Huff  [address] [address]     Dear Colleague,    Thank you for referring your patient, Amaya Huff, to the [facility] [place] [facility]. Please see a copy of my visit note below.    Amaya is a 71 year old who is being evaluated via a billable telephone visit.      What phone number would you like to be contacted at? [phone]  How would you like to obtain your AVS? [employer]  Phone call duration: 30 minutes    Lives in [place]. 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 On aspirin.      x 2. Uterine fibroids, hand surgery last week for previous arthritis, tonsillectomy, left handed. HTN and HLD    Got up from bed and felt dizzy suddenly. Dizziness/lightheadedness did not resolve.  Checked BP and was 165/94. So went to ED. Question of facial droop in ED, stroke workup done.     Dizziness resolved within an hour.  Saw her primary prior to hand surgery.    Paternal grandmother  of SAH aneurysm.     Discussed options of endovascular treatment and observation. Will bring her in person the week of  to review images and answer additional questions.    See dictated note.    CARMEN Zepeda MD          Amaya is a 71 year old who is being evaluated via a billable telephone visit.      What phone number would you like to be contacted at? [phone]  How would you like to obtain your AVS? [employer]  Phone call duration: 30 minutes    Lives in [place]. 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 On aspirin.      x 2. Uterine fibroids, hand surgery last week for previous arthritis, tonsillectomy, left handed. HTN and HLD    Got up from bed and felt dizzy suddenly. Dizziness/lightheadedness did not resolve.  Checked BP and was 165/94. So went to ED. Question of facial droop in ED, stroke workup done.     Dizziness resolved within an hour.  Saw her primary prior to hand surgery.    Paternal grandmother  of SAH aneurysm.     Discussed options of endovascular treatment and observation. Will bring her in person the week of  or the follow ing week to review images and answer additional questions.    See dictated note.    CARMEN Zepeda MD            Again, thank you for allowing me to participate in the care of your patient.      Sincerely,    Williams Zepeda MD      "

## 2022-06-16 NOTE — LETTER
"2022      RE: Amaya Huff  [address] [place] [address]           Lives in [place]. 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 On aspirin.      x 2. Uterine fibroids, hand surgery last week for previous arthritis, tonsillectomy, left handed. HTN and HLD    Got up from bed and felt dizzy suddenly. Dizziness/lightheadedness did not resolve.  Checked BP and was 165/94. So went to ED. Question of facial droop in ED, stroke workup done.     Dizziness resolved within an hour.  Saw her primary prior to hand surgery.    Paternal grandmother  of SAH aneurysm.     Discussed options of endovascular treatment and observation. Will bring her in person the week of  to review images and answer additional questions.    See dictated note.    CARMEN Zepeda MD      Service Date: 2022    Carline Palm MD   [facility]  [address]  [place] [address]     RE:  Amaya Huff  MRN: [id number]  : 1950    Dear Dr. Palm:      We spoke to Ms. Huff as part of a telephone visit in [facility] on 2022.  We have reviewed the notes from her recent overnight hospitalization at [facility].    In summary, she is a 71-year-old left-handed woman who developed a sudden onset of disequilibrium and lightheadedness on the morning of 2022.  The symptoms did not resolve and she checked her blood pressure at home.  Her blood pressure was elevated at 165/90.  She contacted her  and they went to the [facility] Emergency Department.  Apparently, there was a question of facial droop in the Emergency Department and a stroke evaluation was completed.  Her dizziness resolved within about an hour " upon arrival to the Emergency Department.  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    She currently feels well.  She denies any headaches.  She has not had any recurrent symptoms of lightheadedness or disequilibrium.  She was discharged home the next day.    She denies any headaches.  She exercises frequently.    PAST MEDICAL HISTORY:   1.  Prolapsed mitral valve.  2.  Irregular heartbeat/palpitations.  3.  Coronary artery disease, coronary angiogram in , on a daily aspirin.  4.  Hypertension.  5.  Hyperlipidemia.  6.   x2.  7.  Uterine fibroids.  8.  Hand surgery earlier this month with a previous history of arthritis secondary to fracture.  9.  Tonsillectomy.    FAMILY HISTORY:  Her paternal grandmother  of an aneurysmal subarachnoid hemorrhage.    SOCIAL HISTORY:  She lives in [place].  She is remarried.  Her first  .  She has 2 children.  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  She is nonsmoker.    Over the phone, she sounded a bit anxious, but otherwise, her speech, language and phonation were normal.    REVIEW OF STUDIES:  We went over her CTA of the head and neck from 2022.  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  There appears to be a daughter sac associated with this aneurysm.    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  Her main risk factor is hypertension, which is under better control.  She has a second degree family history of aneurysmal subarachnoid hemorrhage.  The aneurysm is small.  Overall, she has a relatively low annual risk factors for aneurysmal rupture.  However, her general health seems to be good and her life expectancy also seems to reflect this.  Therefore, it is reasonable to observe this aneurysm or to consider treatment.    Treatment would be best with endovascular techniques, given her age.  Her initial  consideration was observation.  When we discussed the accumulating lifetime risk of rupture, she did pause to reconsider.  Of course, there are procedural risks to consider.  We both agreed to bring her in for an in-person visit in the next 1-2 weeks to review her imaging and discuss management further.  We have no activity restrictions for her.      We will keep you informed of her progress.  Please do not hesitate to contact us with questions.    Sincerely,    Williams Zepeda MD        D: 2022   T: 2022   MT: JACQUIE    Name:     STEVEN BAE  MRN:      [id number]        Account:      [id number]   :      1950           Service Date: 2022       Document: [id number]

## 2022-06-16 NOTE — PROGRESS NOTES
"Amaya is a 71 year old who is being evaluated via a billable telephone visit.      What phone number would you like to be contacted at? [phone]  How would you like to obtain your AVS? [place]  Phone call duration: 30 minutes    Lives in [place]. 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 On aspirin.      x 2. Uterine fibroids, hand surgery last week for previous arthritis, tonsillectomy, left handed. HTN and HLD    Got up from bed and felt dizzy suddenly. Dizziness/lightheadedness did not resolve.  Checked BP and was 165/94. So went to ED. Question of facial droop in ED, stroke workup done.     Dizziness resolved within an hour.  Saw her primary prior to hand surgery.    Paternal grandmother  of SAH aneurysm.     Discussed options of endovascular treatment and observation. Will bring her in person the week of  to review images and answer additional questions.    See dictated note.    CARMEN Zepeda MD        "

## 2022-06-18 NOTE — PATIENT INSTRUCTIONS
In person visit in the next 1-2 weeks to discuss management of aneurysm in more detail    If you have any questions please contact me at [phone], option 3.    Cheri Rodgers RN, CNRN, SCRN  Stroke & Endovascular Care Coordinator    Thank you for choosing [facility] for your health care needs.

## 2022-06-18 NOTE — PROGRESS NOTES
"Amaya is a 71 year old who is being evaluated via a billable telephone visit.      What phone number would you like to be contacted at? [phone]  How would you like to obtain your AVS? Rene  Phone call duration: 30 minutes    Lives in [place]. 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 On aspirin.      x 2. Uterine fibroids, hand surgery last week for previous arthritis, tonsillectomy, left handed. HTN and HLD    Got up from bed and felt dizzy suddenly. Dizziness/lightheadedness did not resolve.  Checked BP and was 165/94. So went to ED. Question of facial droop in ED, stroke workup done.     Dizziness resolved within an hour.  Saw her primary prior to hand surgery.    Paternal grandmother  of SAH aneurysm.     Discussed options of endovascular treatment and observation. Will bring her in person the week of  or the follow ing week to review images and answer additional questions.    See dictated note.    CARMEN Zepeda MD        "

## 2022-06-19 NOTE — PROGRESS NOTES
Service Date: 2022    Carline Palm MD   [facility]  [address]  [place] [address]     RE:  Amaya Huff  MRN: [id number]  : 1950    Dear Dr. Palm:      We spoke to Ms. Huff as part of a telephone visit in [facility]brovascular Clinic on 2022.  We have reviewed the notes from her recent overnight hospitalization at [facility].    In summary, she is a 71-year-old left-handed woman who developed a sudden onset of disequilibrium and lightheadedness on the morning of 2022.  The symptoms did not resolve and she checked her blood pressure at home.  Her blood pressure was elevated at 165/90.  She contacted her  and they went to the [facility] Emergency Department.  Apparently, there was a question of facial droop in the Emergency Department and a stroke evaluation was completed.  Her dizziness resolved within about an hour upon arrival to the Emergency Department.  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    She currently feels well.  She denies any headaches.  She has not had any recurrent symptoms of lightheadedness or disequilibrium.  She was discharged home the next day.    She denies any headaches.  She exercises frequently.    PAST MEDICAL HISTORY:   1.  Prolapsed mitral valve.  2.  Irregular heartbeat/palpitations.  3.  Coronary artery disease, coronary angiogram in , on a daily aspirin.  4.  Hypertension.  5.  Hyperlipidemia.  6.   x2.  7.  Uterine fibroids.  8.  Hand surgery earlier this month with a previous history of arthritis secondary to fracture.  9.  Tonsillectomy.    FAMILY HISTORY:  Her paternal grandmother  of an aneurysmal subarachnoid hemorrhage.    SOCIAL HISTORY:  She lives in [place].  She is remarried.  Her first  .  She has 2 children.  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  She is nonsmoker.    Over the  phone, she sounded a bit anxious, but otherwise, her speech, language and phonation were normal.    REVIEW OF STUDIES:  We went over her CTA of the head and neck from 2022.  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  There appears to be a daughter sac associated with this aneurysm.    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  Her main risk factor is hypertension, which is under better control.  She has a second degree family history of aneurysmal subarachnoid hemorrhage.  The aneurysm is small.  Overall, she has a relatively low annual risk factors for aneurysmal rupture.  However, her general health seems to be good and her life expectancy also seems to reflect this.  Therefore, it is reasonable to observe this aneurysm or to consider treatment.    Treatment would be best with endovascular techniques, given her age.  Her initial consideration was observation.  When we discussed the accumulating lifetime risk of rupture, she did pause to reconsider.  Of course, there are procedural risks to consider.  We both agreed to bring her in for an in-person visit in the next 1-2 weeks to review her imaging and discuss management further.  We have no activity restrictions for her.      We will keep you informed of her progress.  Please do not hesitate to contact us with questions.    Sincerely,    Williams Zepeda MD        D: 2022   T: 2022   MT: JACQUIE    Name:     STEVEN HUFF  MRN:      [id number]        Account:      [id number]   :      1950           Service Date: 2022       Document: [id number]

## 2022-06-21 ENCOUNTER — TELEPHONE (OUTPATIENT)
Dept: CARDIOLOGY | Facility: CLINIC | Age: 72
End: 2022-06-21

## 2022-06-21 NOTE — TELEPHONE ENCOUNTER
[facility] Call Center    Phone Message    May a detailed message be left on voicemail: yes     Reason for Call: Other: Patient would like to schedule an appointment ASAP with . Patient states she will be having brain surgery and would like to discuss things directly with . Please call patient to discuss further options, thank you.     Action Taken: Message routed to:  Other: Cardiology    Travel Screening: Not Applicable

## 2022-06-23 ENCOUNTER — TELEPHONE (OUTPATIENT)
Dept: NEUROSURGERY | Facility: CLINIC | Age: 72
End: 2022-06-23

## 2022-06-23 NOTE — TELEPHONE ENCOUNTER
[facility] Call Center    Phone Message    May a detailed message be left on voicemail: yes     Reason for Call: Appointment Intake    Referring Provider Name: Myron  Diagnosis and/or Symptoms: In person appt the week of 6/27/22 per Dr. Fernandez.  Writer unable to schedule in timeframe.  Please call patient back to schedule    Action Taken: Message routed to:  [facility] ([facility]): [facility]    Travel Screening: Not Applicable

## 2022-06-24 NOTE — TELEPHONE ENCOUNTER
[facility] Call Center    Phone Message    May a detailed message be left on voicemail: yes     Reason for Call: Other: pt calling because she was expecting a call to schedule an in person appt with Dr. Zepeda sometime next week. Writer unable to schedule due to no availability until August. Please call back when available.    Action Taken: Other: neurosurgery    Travel Screening: Not Applicable

## 2022-06-24 NOTE — TELEPHONE ENCOUNTER
Writer routed to Neurosurgery Clinic Scheduling   Returning call to schedule     Alisson Long LPN  Neurosurgery

## 2022-06-27 NOTE — TELEPHONE ENCOUNTER
Writer routed to Neurosurgery Clinic Scheduling   Attn: Cherise WHITE   Patient reaching out for appointment.     Alisson Long LPN  Neurosurgery

## 2022-06-27 NOTE — TELEPHONE ENCOUNTER
[facility] Call Center    Phone Message    May a detailed message be left on voicemail: yes     Reason for Call: Other: pt calling again regarding a follow up appt with Dr. Zepeda this week. please call back when available.     Action Taken: Other: neurosurgery    Travel Screening: Not Applicable

## 2022-06-29 NOTE — TELEPHONE ENCOUNTER
[facility] Call Center    Phone Message    May a detailed message be left on voicemail: yes     Reason for Call: Other: patient is calling to to schedule in person appt with Dr. Zepeda. Per OV notes, patient was to be seen within 1-2 weeks.      Please contact patient at [phone] to schedule.    Action Taken: Message routed to:  [facility] ([facility]): Neurosurgery    Travel Screening: Not Applicable

## 2022-06-29 NOTE — TELEPHONE ENCOUNTER
Patient calling for assistance in getting scheduled with Neurosurgeon Dr. Zepeda.     Patient has not had a response yet and has been trying to get scheduled since 6/23/22.      Per 6/16/22 VV notes:  Return in about 2 weeks (around 6/30/2022) for Follow up, with me, in person

## 2022-07-05 ENCOUNTER — TELEPHONE (OUTPATIENT)
Dept: NEUROSURGERY | Facility: CLINIC | Age: 72
End: 2022-07-05

## 2022-07-05 NOTE — TELEPHONE ENCOUNTER
Spoke with patient, appointment rescheduled due to Provider needs for 7/12/22 @ 2pm in the clinic.  Appointment for 7/5 @ 3pm canceled.    Patient ok with schedule change and has my name and number if needed..    Note sent to scheduling

## 2022-07-11 ENCOUNTER — TRANSFERRED RECORDS (OUTPATIENT)
Dept: FAMILY MEDICINE | Facility: CLINIC | Age: 72
End: 2022-07-11

## 2022-07-12 ENCOUNTER — OFFICE VISIT (OUTPATIENT)
Dept: NEUROSURGERY | Facility: CLINIC | Age: 72
End: 2022-07-12
Payer: COMMERCIAL

## 2022-07-12 VITALS
SYSTOLIC BLOOD PRESSURE: 161 MMHG | WEIGHT: 139 LBS | OXYGEN SATURATION: 99 % | DIASTOLIC BLOOD PRESSURE: 81 MMHG | BODY MASS INDEX: 22.44 KG/M2 | HEART RATE: 75 BPM

## 2022-07-12 DIAGNOSIS — I72.9 ANEURYSM (H): Primary | ICD-10-CM

## 2022-07-12 DIAGNOSIS — I99.8 OTHER DISORDER OF CIRCULATORY SYSTEM: ICD-10-CM

## 2022-07-12 PROCEDURE — 99207 PR NO DOCUMENTATION ON VISIT: CPT | Performed by: NEUROLOGICAL SURGERY

## 2022-07-12 ASSESSMENT — PAIN SCALES - GENERAL: PAINLEVEL: NO PAIN (0)

## 2022-07-12 NOTE — Clinical Note
7/12/2022       RE: Amaya Huff  [address]  [place] [address]     Dear Colleague,    Thank you for referring your patient, Amaya Huff, to the [facility] [place] at [facility]. Please see a copy of my visit note below.    Informed patient and , Rich: Scheduling will contact you to make arrangements for your angiogram. You will need COVID test and blood work 2-4 days prior to your procedure. You will need a  home and someone that can stay with you through the night. Do not eat for 8 hours prior to your procedure. You may drink clear liquids (includes water, Jell-O, clear broth, apple juice or any non-carbonated beverage that you can see through) until 2 hours prior to your procedure. You may take your medications with a sip of water. You will check in at the Gold waiting room at the [facility] 1.5 hours prior to your procedure. You will receive written instructions and a map to the hospital after your procedure has been scheduled.     All questions answered. Patient verbalized understanding. Contact information provided and encouraged to call with questions/concerns.          Again, thank you for allowing me to participate in the care of your patient.      Sincerely,    Williams Zepeda MD

## 2022-07-12 NOTE — PATIENT INSTRUCTIONS
Scheduling will contact you to make arrangements for your angiogram. You will need COVID test and blood work 2-4 days prior to your procedure. You will need a  home and someone that can stay with you through the night. Do not eat for 8 hours prior to your procedure. You may drink clear liquids (includes water, Jell-O, clear broth, apple juice or any non-carbonated beverage that you can see through) until 2 hours prior to your procedure. You may take your medications with a sip of water. You will check in at the Gold waiting room at the [facility] 1.5 hours prior to your procedure. You will receive written instructions and a map to the hospital after your procedure has been scheduled.      If you have questions regarding your procedure, please contact me at [phone], option 3.    If you need to cancel, reschedule or have procedure scheduling related questions, please call Tammy at [phone].    Thank you,  Cheri Rodgers RN, CNRN, SCRN  Stroke & Endovascular Care Coordinator

## 2022-07-12 NOTE — LETTER
7/12/2022      RE: Amaya Huff  [address] [place] [address]       Informed patient and , Rich: Scheduling will contact you to make arrangements for your angiogram. You will need COVID test and blood work 2-4 days prior to your procedure. You will need a  home and someone that can stay with you through the night. Do not eat for 8 hours prior to your procedure. You may drink clear liquids (includes water, Jell-O, clear broth, apple juice or any non-carbonated beverage that you can see through) until 2 hours prior to your procedure. You may take your medications with a sip of water. You will check in at the Gold waiting room at the [facility] 1.5 hours prior to your procedure. You will receive written instructions and a map to the hospital after your procedure has been scheduled.     All questions answered. Patient verbalized understanding. Contact information provided and encouraged to call with questions/concerns.          Williams Zepeda MD

## 2022-07-12 NOTE — PROGRESS NOTES
Informed patient and , Khris: Scheduling will contact you to make arrangements for your angiogram. You will need COVID test and blood work 2-4 days prior to your procedure. You will need a  home and someone that can stay with you through the night. Do not eat for 8 hours prior to your procedure. You may drink clear liquids (includes water, Jell-O, clear broth, apple juice or any non-carbonated beverage that you can see through) until 2 hours prior to your procedure. You may take your medications with a sip of water. You will check in at the Gold waiting room at the [facility] 1.5 hours prior to your procedure. You will receive written instructions and a map to the hospital after your procedure has been scheduled.     All questions answered. Patient verbalized understanding. Contact information provided and encouraged to call with questions/concerns.

## 2022-07-18 ENCOUNTER — TELEPHONE (OUTPATIENT)
Dept: NEUROSURGERY | Facility: CLINIC | Age: 72
End: 2022-07-18

## 2022-07-18 NOTE — TELEPHONE ENCOUNTER
Spok with patient about scheduling, she wishes to no longer schedule with us, they went to the [facility] and are continuing treatment with them.    Anna C. Schoenecker on 7/18/2022 at 11:30 AM

## 2022-07-25 ENCOUNTER — OFFICE VISIT (OUTPATIENT)
Dept: CARDIOLOGY | Facility: CLINIC | Age: 72
End: 2022-07-25
Payer: COMMERCIAL

## 2022-07-25 ENCOUNTER — HOSPITAL ENCOUNTER (OUTPATIENT)
Dept: CARDIOLOGY | Facility: CLINIC | Age: 72
Discharge: HOME | End: 2022-07-25
Attending: INTERNAL MEDICINE | Admitting: INTERNAL MEDICINE
Payer: COMMERCIAL

## 2022-07-25 VITALS
HEART RATE: 85 BPM | HEIGHT: 66 IN | WEIGHT: 140 LBS | OXYGEN SATURATION: 99 % | BODY MASS INDEX: 22.5 KG/M2 | SYSTOLIC BLOOD PRESSURE: 130 MMHG | DIASTOLIC BLOOD PRESSURE: 84 MMHG

## 2022-07-25 DIAGNOSIS — R01.1 MURMUR, CARDIAC: ICD-10-CM

## 2022-07-25 DIAGNOSIS — I25.10 CORONARY ARTERY DISEASE INVOLVING NATIVE CORONARY ARTERY OF NATIVE HEART WITHOUT ANGINA PECTORIS: Primary | ICD-10-CM

## 2022-07-25 LAB — LVEF ECHO: NORMAL

## 2022-07-25 PROCEDURE — 93306 TTE W/DOPPLER COMPLETE: CPT | Mod: 26 | Performed by: INTERNAL MEDICINE

## 2022-07-25 PROCEDURE — 93306 TTE W/DOPPLER COMPLETE: CPT

## 2022-07-25 PROCEDURE — 99214 OFFICE O/P EST MOD 30 MIN: CPT | Mod: 25 | Performed by: INTERNAL MEDICINE

## 2022-07-25 RX ORDER — CLOPIDOGREL BISULFATE 75 MG/1
75 TABLET ORAL DAILY
COMMUNITY
Start: 2022-07-29 | End: 2022-11-08

## 2022-07-25 NOTE — PROGRESS NOTES
"HPI and Plan:   See dictation    Today's clinic visit entailed:  30 minutes spent on the date of the encounter doing chart review, history and exam, documentation and further activities per the note  Provider  Link to MDM Help Grid         Orders Placed This Encounter   Procedures     Follow-Up with Cardiology     Follow-Up with Cardiology     Echocardiogram Complete       Orders Placed This Encounter   Medications     Probiotic Product (PROBIOTIC PO)     clopidogrel (PLAVIX) 75 MG tablet     Sig: Take 75 mg by mouth daily     aspirin (ASA) 325 MG EC tablet     Sig: Take 325 mg by mouth daily       There are no discontinued medications.      Encounter Diagnoses   Name Primary?     Coronary artery disease involving native coronary artery of native heart without angina pectoris Yes     Murmur, cardiac        CURRENT MEDICATIONS:  Current Outpatient Medications   Medication Sig Dispense Refill     [START ON 7/29/2022] aspirin (ASA) 325 MG EC tablet Take 325 mg by mouth daily       aspirin 81 MG tablet Take 81 mg by mouth daily       atorvastatin (LIPITOR) 20 MG tablet Take 1 tablet (20 mg) by mouth daily 90 tablet 3     Calcium Carbonate-Vitamin D (CALCIUM + D PO) Take 1 chew tab by mouth 2 times daily 1300 mg daily  (650 each)       [START ON 7/29/2022] clopidogrel (PLAVIX) 75 MG tablet Take 75 mg by mouth daily       losartan (COZAAR) 50 MG tablet Take 1 tablet (50 mg) by mouth At Bedtime 90 tablet 3     metoprolol tartrate (LOPRESSOR) 25 MG tablet Take 1 tablet (25 mg) by mouth 2 times daily 180 tablet 3     NONFORMULARY nutrafol       omega-3 acid ethyl esters (LOVAZA) 1 g capsule Take 2 g by mouth daily       Probiotic Product (PROBIOTIC PO)        tretinoin (RETIN-A) 0.025 % external cream APPLY TO FACE EVERY DAY AT BEDTIME         ALLERGIES     Allergies   Allergen Reactions     Penicillins      \"severe migraines\"       PAST MEDICAL HISTORY:  Past Medical History:   Diagnosis Date     Arthritis 2022     Brain " aneurysm      Coronary artery disease     Mild to moderate CAD noted on 3/18/14 coronary angiogram      Heart palpitations      History of angina      Hyperlipidemia with target LDL less than 130 2015     Diagnosis updated by automated process. Provider to review and confirm.     Irregular heartbeat      Mitral valve disorders(424.0)      Unspecified essential hypertension        PAST SURGICAL HISTORY:  Past Surgical History:   Procedure Laterality Date     ABDOMEN SURGERY  , ,    2 C-sections 1 laproscope for fibroids     COLONOSCOPY  2012    Procedure:COLONOSCOPY; COLONOSCOPY; Surgeon:GINGER PARKS; Location: GI     COLONOSCOPY N/A 2017    Procedure: COLONOSCOPY;  colonoscopy;  Surgeon: Dez Siegel MD;  Location:  GI     CORONARY ANGIOGRAPHY ADULT ORDER  2014    3/18/14- Mild to moderate CAD, no interventions, treat medically.     ENT SURGERY  tonsils     GYN SURGERY  c sections     ZZC NONSPECIFIC PROCEDURE      S/P T&A     ZZC NONSPECIFIC PROCEDURE      LASER FIBROIDS     ZZC NONSPECIFIC PROCEDURE       x2       FAMILY HISTORY:  Family History   Problem Relation Age of Onset     C.A.D. Mother      Parkinsonism Mother 80     Breast Cancer Mother 80     Hypertension Mother      Osteoporosis Mother      Circulatory Maternal Grandmother         MVP     Aneurysm Paternal Grandmother 65     Alcohol/Drug Daughter 30     Cerebrovascular Disease Son 40        stroke, PFO     Circulatory Maternal Aunt         MVP     Breast Cancer Maternal Aunt 80     Breast Cancer Other         Maternal Aunt       SOCIAL HISTORY:  Social History     Socioeconomic History     Marital status:      Spouse name: None     Number of children: None     Years of education: None     Highest education level: None   Tobacco Use     Smoking status: Never Smoker     Smokeless tobacco: Never Used   Substance and Sexual Activity     Alcohol use: Yes     Comment: 1 glass  "wine/day     Drug use: No     Sexual activity: Yes     Partners: Male     Birth control/protection: None   Other Topics Concern     Parent/sibling w/ CABG, MI or angioplasty before 65F 55M? No     Caffeine Concern No     Comment: coffee: 2 cups a day     Sleep Concern No     Stress Concern No     Special Diet Yes     Comment: little red meat, a lot of veggies & fruit     Exercise Yes     Comment: walk and weight lifiting 5-6 x a week   Social History Narrative          had MI 47        Remarried       Review of Systems:  Skin:  not assessed       Eyes:  not assessed      ENT:  not assessed      Respiratory:  Negative       Cardiovascular:    palpitations;Positive for occasional palpitations  Gastroenterology: not assessed      Genitourinary:  not assessed      Musculoskeletal:  not assessed      Neurologic:  not assessed      Psychiatric:  not assessed      Heme/Lymph/Imm:  not assessed      Endocrine:  not assessed        Physical Exam:  Vitals: /84 (BP Location: Right arm, Patient Position: Sitting, Cuff Size: Adult Regular)   Pulse 85   Ht 1.676 m (5' 6\")   Wt 63.5 kg (140 lb)   SpO2 99%   BMI 22.60 kg/m      Constitutional:  cooperative;in no acute distress        Skin:  warm and dry to the touch;warm and dry to the touch, no apparent skin lesions or masses noted          Head:  normocephalic;normocephalic, no masses or lesions        Eyes:           Lymph:      ENT:  no pallor or cyanosis;no pallor or cyanosis, dentition good        Neck:  JVP normal;carotid pulses are full and equal bilaterally;no carotid bruit        Respiratory:  clear to auscultation         Cardiac: regular rhythm;normal S1 and S2       systolic murmur;grade 2        pulses full and equal;pulses full and equal, no bruits auscultated                                        GI:  abdomen soft;non-tender;no bruits        Extremities and Muscular Skeletal:  no edema;no deformities, clubbing, cyanosis, erythema observed   "            Neurological:  no gross motor deficits        Psych:  Alert and Oriented x 3        CC  No referring provider defined for this encounter.

## 2022-07-25 NOTE — LETTER
7/25/2022    Carline Palm MD  [address]  [place] [address]    RE: Amaya Huff       Dear Colleague,     I had the pleasure of seeing Amaya Huff in the [facility].  HPI and Plan:   See dictation    Today's clinic visit entailed:  30 minutes spent on the date of the encounter doing chart review, history and exam, documentation and further activities per the note  Provider  Link to MDM Help Grid         Orders Placed This Encounter   Procedures     Follow-Up with Cardiology     Follow-Up with Cardiology     Echocardiogram Complete       Orders Placed This Encounter   Medications     Probiotic Product (PROBIOTIC PO)     clopidogrel (PLAVIX) 75 MG tablet     Sig: Take 75 mg by mouth daily     aspirin (ASA) 325 MG EC tablet     Sig: Take 325 mg by mouth daily       There are no discontinued medications.      Encounter Diagnoses   Name Primary?     Coronary artery disease involving native coronary artery of native heart without angina pectoris Yes     Murmur, cardiac        CURRENT MEDICATIONS:  Current Outpatient Medications   Medication Sig Dispense Refill     [START ON 7/29/2022] aspirin (ASA) 325 MG EC tablet Take 325 mg by mouth daily       aspirin 81 MG tablet Take 81 mg by mouth daily       atorvastatin (LIPITOR) 20 MG tablet Take 1 tablet (20 mg) by mouth daily 90 tablet 3     Calcium Carbonate-Vitamin D (CALCIUM + D PO) Take 1 chew tab by mouth 2 times daily 1300 mg daily  (650 each)       [START ON 7/29/2022] clopidogrel (PLAVIX) 75 MG tablet Take 75 mg by mouth daily       losartan (COZAAR) 50 MG tablet Take 1 tablet (50 mg) by mouth At Bedtime 90 tablet 3     metoprolol tartrate (LOPRESSOR) 25 MG tablet Take 1 tablet (25 mg) by mouth 2 times daily 180 tablet 3     NONFORMULARY nutrafol       omega-3 acid ethyl esters (LOVAZA) 1 g capsule Take 2 g by mouth daily       Probiotic Product (PROBIOTIC PO)        tretinoin (RETIN-A) 0.025 % external cream APPLY TO FACE  "EVERY DAY AT BEDTIME         ALLERGIES     Allergies   Allergen Reactions     Penicillins      \"severe migraines\"       PAST MEDICAL HISTORY:  Past Medical History:   Diagnosis Date     Arthritis      Brain aneurysm      Coronary artery disease     Mild to moderate CAD noted on 3/18/14 coronary angiogram      Heart palpitations      History of angina      Hyperlipidemia with target LDL less than 130 2015     Diagnosis updated by automated process. Provider to review and confirm.     Irregular heartbeat      Mitral valve disorders(424.0)      Unspecified essential hypertension        PAST SURGICAL HISTORY:  Past Surgical History:   Procedure Laterality Date     ABDOMEN SURGERY  , ,    2 C-sections 1 laproscope for fibroids     COLONOSCOPY  2012    Procedure:COLONOSCOPY; COLONOSCOPY; Surgeon:GINGER PARKS; Location: GI     COLONOSCOPY N/A 2017    Procedure: COLONOSCOPY;  colonoscopy;  Surgeon: Dez Siegel MD;  Location:  GI     CORONARY ANGIOGRAPHY ADULT ORDER  2014    3/18/14- Mild to moderate CAD, no interventions, treat medically.     ENT SURGERY  tonsils     GYN SURGERY  c sections     ZZC NONSPECIFIC PROCEDURE      S/P T&A     ZZ NONSPECIFIC PROCEDURE      LASER FIBROIDS     Z NONSPECIFIC PROCEDURE       x2       FAMILY HISTORY:  Family History   Problem Relation Age of Onset     C.A.D. Mother      Parkinsonism Mother 80     Breast Cancer Mother 80     Hypertension Mother      Osteoporosis Mother      Circulatory Maternal Grandmother         MVP     Aneurysm Paternal Grandmother 65     Alcohol/Drug Daughter 30     Cerebrovascular Disease Son 40        stroke, PFO     Circulatory Maternal Aunt         MVP     Breast Cancer Maternal Aunt 80     Breast Cancer Other         Maternal Aunt       SOCIAL HISTORY:  Social History     Socioeconomic History     Marital status:      Spouse name: None     Number of children: None     Years " "of education: None     Highest education level: None   Tobacco Use     Smoking status: Never Smoker     Smokeless tobacco: Never Used   Substance and Sexual Activity     Alcohol use: Yes     Comment: 1 glass wine/day     Drug use: No     Sexual activity: Yes     Partners: Male     Birth control/protection: None   Other Topics Concern     Parent/sibling w/ CABG, MI or angioplasty before 65F 55M? No     Caffeine Concern No     Comment: coffee: 2 cups a day     Sleep Concern No     Stress Concern No     Special Diet Yes     Comment: little red meat, a lot of veggies & fruit     Exercise Yes     Comment: walk and weight lifiting 5-6 x a week   Social History Narrative          had MI 47        Remarried       Review of Systems:  Skin:  not assessed       Eyes:  not assessed      ENT:  not assessed      Respiratory:  Negative       Cardiovascular:    palpitations;Positive for occasional palpitations  Gastroenterology: not assessed      Genitourinary:  not assessed      Musculoskeletal:  not assessed      Neurologic:  not assessed      Psychiatric:  not assessed      Heme/Lymph/Imm:  not assessed      Endocrine:  not assessed        Physical Exam:  Vitals: /84 (BP Location: Right arm, Patient Position: Sitting, Cuff Size: Adult Regular)   Pulse 85   Ht 1.676 m (5' 6\")   Wt 63.5 kg (140 lb)   SpO2 99%   BMI 22.60 kg/m      Constitutional:  cooperative;in no acute distress        Skin:  warm and dry to the touch;warm and dry to the touch, no apparent skin lesions or masses noted          Head:  normocephalic;normocephalic, no masses or lesions        Eyes:           Lymph:      ENT:  no pallor or cyanosis;no pallor or cyanosis, dentition good        Neck:  JVP normal;carotid pulses are full and equal bilaterally;no carotid bruit        Respiratory:  clear to auscultation         Cardiac: regular rhythm;normal S1 and S2       systolic murmur;grade 2        pulses full and equal;pulses full and equal, " no bruits auscultated                                        GI:  abdomen soft;non-tender;no bruits        Extremities and Muscular Skeletal:  no edema;no deformities, clubbing, cyanosis, erythema observed              Neurological:  no gross motor deficits        Psych:  Alert and Oriented x 3        CC  No referring provider defined for this encounter.                Service Date: 07/25/2022    REASON FOR CONSULTATION:  Followup for coronary artery disease and palpitations.    HISTORY OF PRESENT ILLNESS:  I had the pleasure of seeing Amaya Huff at the [facility] in [place] this morning.  She is a very pleasant 72-year-old female with history of mild coronary artery disease (asymptomatic), mitral valve prolapse with mild regurgitation and PVCs, who is here for followup.    We initially evaluated her several years ago for chest pain.  She underwent noninvasive imaging and ultimately had invasive coronary angiography, which revealed mild to moderate nonobstructive coronary artery disease.  She has remained stable over the years with no recurrent chest symptoms.    She presented to the Emergency Department in 05/2022 with dizzy spells.  She had brain imaging, which showed no evidence of stroke, but a 5 mm basilar tip aneurysm.  She is scheduled to undergo cerebral angiogram and possible coiling at [facility] next week.    She is fairly active and walks almost on a daily basis.  She does not endorse significant shortness of breath.  She does not endorse chest pain.  Her palpitations have improved significantly over the years.  Her last echocardiogram from 07/2021 showed normal LV function.    On exam today, she has a loud systolic murmur, which was not appreciated on previous physical examination.    ASSESSMENT AND PLAN:    1.  History of mild coronary artery disease, asymptomatic.  2.  History of PVCs and palpitations, stable.  3.  History of mitral valve prolapse with mild regurgitation.  4.   Systolic murmur.    She is very stable from a cardiac point of view and does not endorse any cardiopulmonary symptoms.  As such, she can proceed with her upcoming cerebral angiogram and possible coiling.  Given the significant murmur auscultated today on exam, we will obtain an echocardiogram to further assess possible underlying valvular issues.  Clinically, she does not have any cardiopulmonary symptoms.  Therefore, I doubt she has significant valvular issues.  We will contact her once we get the results of the echocardiogram.      Otherwise, she will continue her current medical program, which includes statin, aspirin and beta blockers.    It was a pleasure seeing Ms. Huff in clinic this morning.  I will see her in approximately 1 year unless the echo shows significant abnormalities.  I appreciate the opportunity to participate in her care.    Josh Simental MD        D: 2022   T: 2022   MT: JACQUIE    Name:     STEVEN HUFF  MRN:      [id number]        Account:      [id number]   :      1950           Service Date: 2022       Document: [id number]      Thank you for allowing me to participate in the care of your patient.      Sincerely,     Josh Simental MD, MD     [facility]  cc: No referring provider defined for this encounter.

## 2022-07-25 NOTE — PROGRESS NOTES
Service Date: 07/25/2022    REASON FOR CONSULTATION:  Followup for coronary artery disease and palpitations.    HISTORY OF PRESENT ILLNESS:  I had the pleasure of seeing Amaya Huff at the [facility] in [place] this morning.  She is a very pleasant 72-year-old female with history of mild coronary artery disease (asymptomatic), mitral valve prolapse with mild regurgitation and PVCs, who is here for followup.    We initially evaluated her several years ago for chest pain.  She underwent noninvasive imaging and ultimately had invasive coronary angiography, which revealed mild to moderate nonobstructive coronary artery disease.  She has remained stable over the years with no recurrent chest symptoms.    She presented to the Emergency Department in 05/2022 with dizzy spells.  She had brain imaging, which showed no evidence of stroke, but a 5 mm basilar tip aneurysm.  She is scheduled to undergo cerebral angiogram and possible coiling at [facility] next week.    She is fairly active and walks almost on a daily basis.  She does not endorse significant shortness of breath.  She does not endorse chest pain.  Her palpitations have improved significantly over the years.  Her last echocardiogram from 07/2021 showed normal LV function.    On exam today, she has a loud systolic murmur, which was not appreciated on previous physical examination.    ASSESSMENT AND PLAN:    1.  History of mild coronary artery disease, asymptomatic.  2.  History of PVCs and palpitations, stable.  3.  History of mitral valve prolapse with mild regurgitation.  4.  Systolic murmur.    She is very stable from a cardiac point of view and does not endorse any cardiopulmonary symptoms.  As such, she can proceed with her upcoming cerebral angiogram and possible coiling.  Given the significant murmur auscultated today on exam, we will obtain an echocardiogram to further assess possible underlying valvular issues.  Clinically, she does  not have any cardiopulmonary symptoms.  Therefore, I doubt she has significant valvular issues.  We will contact her once we get the results of the echocardiogram.      Otherwise, she will continue her current medical program, which includes statin, aspirin and beta blockers.    It was a pleasure seeing Ms. Huff in clinic this morning.  I will see her in approximately 1 year unless the echo shows significant abnormalities.  I appreciate the opportunity to participate in her care.    Josh Simental MD        D: 2022   T: 2022   MT: JACQUIE    Name:     STEVEN HUFF  MRN:      [id number]        Account:      [id number]   :      1950           Service Date: 2022       Document: [id number]

## 2022-07-26 ENCOUNTER — TELEPHONE (OUTPATIENT)
Dept: CARDIOLOGY | Facility: CLINIC | Age: 72
End: 2022-07-26

## 2022-07-26 NOTE — TELEPHONE ENCOUNTER
Received preliminary results from Amaya's Echocardiogram of yesterday.    Writer has requested input from Dr. Simental.  He has reached out to Amaya and reviewed the Echo.  He states that she will proceed with her cerebral angiogram as scheduled.    He would like to follow up with her in one to two months.  Writer has entered order request for appointment.

## 2022-08-12 ENCOUNTER — TRANSFERRED RECORDS (OUTPATIENT)
Dept: HEALTH INFORMATION MANAGEMENT | Facility: CLINIC | Age: 72
End: 2022-08-12

## 2022-08-15 ENCOUNTER — TRANSCRIBE ORDERS (OUTPATIENT)
Dept: OTHER | Age: 72
End: 2022-08-15

## 2022-08-15 DIAGNOSIS — Z95.3 S/P MITRAL VALVE REPLACEMENT WITH TISSUE VALVE: Primary | ICD-10-CM

## 2022-08-18 ENCOUNTER — HOSPITAL ENCOUNTER (OUTPATIENT)
Dept: CARDIAC REHAB | Facility: CLINIC | Age: 72
Discharge: HOME | End: 2022-08-18
Attending: THORACIC SURGERY (CARDIOTHORACIC VASCULAR SURGERY)
Payer: COMMERCIAL

## 2022-08-18 DIAGNOSIS — Z95.3 S/P MITRAL VALVE REPLACEMENT WITH TISSUE VALVE: ICD-10-CM

## 2022-08-18 PROCEDURE — 93797 PHYS/QHP OP CAR RHAB WO ECG: CPT | Mod: 59 | Performed by: OCCUPATIONAL THERAPIST

## 2022-08-18 PROCEDURE — 93798 PHYS/QHP OP CAR RHAB W/ECG: CPT | Performed by: OCCUPATIONAL THERAPIST

## 2022-08-19 ENCOUNTER — OFFICE VISIT (OUTPATIENT)
Dept: FAMILY MEDICINE | Facility: CLINIC | Age: 72
End: 2022-08-19
Payer: COMMERCIAL

## 2022-08-19 VITALS
HEART RATE: 69 BPM | BODY MASS INDEX: 21.86 KG/M2 | DIASTOLIC BLOOD PRESSURE: 72 MMHG | OXYGEN SATURATION: 100 % | SYSTOLIC BLOOD PRESSURE: 118 MMHG | WEIGHT: 136 LBS | HEIGHT: 66 IN

## 2022-08-19 DIAGNOSIS — D64.9 POSTOPERATIVE ANEMIA: ICD-10-CM

## 2022-08-19 DIAGNOSIS — I34.0 NONRHEUMATIC MITRAL VALVE REGURGITATION: ICD-10-CM

## 2022-08-19 DIAGNOSIS — Z09 HOSPITAL DISCHARGE FOLLOW-UP: Primary | ICD-10-CM

## 2022-08-19 DIAGNOSIS — I25.10 MILD CAD: ICD-10-CM

## 2022-08-19 LAB
ERYTHROCYTE [DISTWIDTH] IN BLOOD BY AUTOMATED COUNT: 12.6 % (ref 10–15)
HCT VFR BLD AUTO: 33.2 % (ref 35–47)
HGB BLD-MCNC: 10.6 G/DL (ref 11.7–15.7)
MCH RBC QN AUTO: 28.7 PG (ref 26.5–33)
MCHC RBC AUTO-ENTMCNC: 31.9 G/DL (ref 31.5–36.5)
MCV RBC AUTO: 90 FL (ref 78–100)
PLATELET # BLD AUTO: 392 10E3/UL (ref 150–450)
RBC # BLD AUTO: 3.69 10E6/UL (ref 3.8–5.2)
WBC # BLD AUTO: 7.9 10E3/UL (ref 4–11)

## 2022-08-19 PROCEDURE — 85027 COMPLETE CBC AUTOMATED: CPT | Performed by: PHYSICIAN ASSISTANT

## 2022-08-19 PROCEDURE — 99214 OFFICE O/P EST MOD 30 MIN: CPT | Performed by: PHYSICIAN ASSISTANT

## 2022-08-19 PROCEDURE — 36415 COLL VENOUS BLD VENIPUNCTURE: CPT | Performed by: PHYSICIAN ASSISTANT

## 2022-08-19 RX ORDER — COLCHICINE 0.6 MG/1
0.3 TABLET ORAL
COMMUNITY
Start: 2022-08-12 | End: 2022-09-23

## 2022-08-19 RX ORDER — LOSARTAN POTASSIUM 50 MG/1
25 TABLET ORAL
COMMUNITY
Start: 2022-08-12 | End: 2022-11-08

## 2022-08-19 RX ORDER — METOPROLOL TARTRATE 25 MG/1
50 TABLET, FILM COATED ORAL
COMMUNITY
Start: 2022-08-12 | End: 2022-08-19

## 2022-08-19 RX ORDER — METOPROLOL TARTRATE 50 MG
25 TABLET ORAL 2 TIMES DAILY
Start: 2022-08-19 | End: 2022-11-08

## 2022-08-19 RX ORDER — AMIODARONE HYDROCHLORIDE 200 MG/1
TABLET ORAL
COMMUNITY
Start: 2022-08-12 | End: 2022-11-08

## 2022-08-19 ASSESSMENT — PAIN SCALES - GENERAL: PAINLEVEL: NO PAIN (0)

## 2022-08-19 NOTE — PROGRESS NOTES
Assessment and Plan:     (Z09) Hospital discharge follow-up  (primary encounter diagnosis)  Comment: admitted to [facility] 8/8/22 for Mitral and tricuspid valve replacement, she is overall doing well, she is on amiodarone which will taper, she is also on colchicine, apixaban and aspirin, tolerating well   Plan: She has an appointment with cardiology early September, keep appointment, continue current medications,   Medications and doses were updated with her today    (I25.10) Mild CAD  Comment:   Plan: metoprolol tartrate (LOPRESSOR) 50 MG tablet    (D64.9) Postoperative anemia  Comment: Last hemoglobin in the hospital was 9.2   Plan: CBC with platelets    (I34.0) Nonrheumatic mitral valve regurgitation  Comment:  Plan:       Paty Nolan PA-C      Luis Antonio Conde is a 72 year old presenting for the following health issues:  Hospital F/U      [facility]       Hospital Follow-up Visit:    Hospital: [facility], Fifth [facility]  Date of Admission: 8/8/22  Date of Discharge: 8/12/22  Reason(s) for Admission:   Replacement Mitral Valve Tissue (Primary Dx);   Hyperlipidemia;   Regurgitation Mitral    Was your hospitalization related to COVID-19? No   Problems taking medications regularly:  None  Medication changes since discharge: Yes  Problems adhering to non-medication therapy:      Summary of hospitalization:  [facility] discharge summary reviewed  Diagnostic Tests/Treatments reviewed.  Follow up needed: see below  Other Healthcare Providers Involved in Patient s Care:         see below  Update since discharge: improved.         Post Medication Reconciliation Status:        Plan of care communicated with kenya Conde is here for hospital follow-up she is s/p mitral and tricuspid valve repair with Dr. Ford at [facility] on 8/8/22  She had post-op afib and was treated with amiodarone w/normalized rhythm   She is s/p  She has been  "doing well at home  She started cardiac rehab yesterday at [facility]   She has been eating and drinking well  She has been weighing herself daily, she has lost a few pounds since last month  She denies chest pain, shortness of breath, leg swelling, fever/chills, incision issues  She sees cardiology on 9/2/22 (Dr. Morales, [place])     Review of Systems   See above      Objective    /72 (BP Location: Left arm, Patient Position: Sitting, Cuff Size: Adult Regular)   Pulse 69   Ht 1.676 m (5' 6\")   Wt 61.7 kg (136 lb)   SpO2 100%   BMI 21.95 kg/m    Body mass index is 21.95 kg/m .     Physical Exam     GENERAL: healthy, alert and no distress  RESP: lungs clear to auscultation - no rales, no rhonchi, no wheezes  CV: regular rates and rhythm, normal S1 S2, no S3 or S4 and no murmur, no click or rub   MS: extremities- no gross deformities noted, no edema  SKIN: sternal wound c/d/i, healing appropriately               .  ..  "

## 2022-08-19 NOTE — RESULT ENCOUNTER NOTE
Dear Amaya,     Here are your recent complete blood count results which show that your hemoglobin is improving nicely.    Please continue with your current plan of care and let us know if you have any questions or concerns.    Regards,  Paty Nolan PA-C

## 2022-08-24 ENCOUNTER — HOSPITAL ENCOUNTER (OUTPATIENT)
Dept: CARDIAC REHAB | Facility: CLINIC | Age: 72
Discharge: HOME | End: 2022-08-24
Attending: THORACIC SURGERY (CARDIOTHORACIC VASCULAR SURGERY)
Payer: COMMERCIAL

## 2022-08-24 PROCEDURE — 93798 PHYS/QHP OP CAR RHAB W/ECG: CPT

## 2022-08-24 PROCEDURE — 93797 PHYS/QHP OP CAR RHAB WO ECG: CPT | Mod: 59

## 2022-08-26 ENCOUNTER — HOSPITAL ENCOUNTER (OUTPATIENT)
Dept: CARDIAC REHAB | Facility: CLINIC | Age: 72
Discharge: HOME | End: 2022-08-26
Attending: THORACIC SURGERY (CARDIOTHORACIC VASCULAR SURGERY)
Payer: COMMERCIAL

## 2022-08-26 PROCEDURE — 93798 PHYS/QHP OP CAR RHAB W/ECG: CPT | Performed by: REHABILITATION PRACTITIONER

## 2022-08-26 NOTE — PROGRESS NOTES
Service Date: 2022    Carline Palm MD   [facility]   [address], [address] [id number]    RE:      Amaya Huff  MRN:  [id number]  :   1950    Dear Dr. Palm:    We saw Ms. Huff back in [facility] on 2022 to follow up the incidental basilar tip aneurysm discovered during the evaluation of disequilibrium and lightheadedness.  Please see our note from 2022 for additional details.  We brought her in person with her  to review the imaging and discuss further management.  We have been leaning towards endovascular treatment of this aneurysm.  She has not had any recurrent disequilibrium.      She currently feels well.  She is not having any headaches.  She continues to have some anxiety since our discussion.    EXAMINATION:  Her general appearance is good.  Her blood pressure is elevated at 161/81. Heart rate is 75.  Her gross neurological examination remains normal.  Specifically, her speech, language and phonation are normal.  She is moving all extremities with good strength and coordination.  Facial strength is normal.  Extraocular movements are normal.  There is no ptosis.  Pupils are equal and reactive.    REVIEW OF STUDIES:  We went over the CTA of the head from 2022, and it showed the 5 mm basilar tip aneurysm with a relatively wide neck and a daughter sac.      IMPRESSION AND PLAN:  Based on the reasons we outlined in our previous note, we believe endovascular treatment is the best approach.  Observation is also reasonable, but given her good overall health and the aneurysm location and morphology, we believe treatment is best.  We discussed the various endovascular techniques, and most likely she would require stent-assisted coiling.  We discussed doing a diagnostic cerebral angiogram first to define the aneurysm morphology and the treatment risk better.  Because this is not an urgent procedure, we prefer to do  a diagnostic angiogram first and then plan treatment as a second stage.  They both questioned this approach and seemed to accept it after our explanation.  She agreed to proceed with a diagnostic angiogram after we went over the risks of access site hematoma, arterial injury and stroke.  We will schedule this in the near future according to her schedule.  We will keep you informed of her progress.    ADDENDUM:  She decided to seek treatment down in [place].  We will be available to follow her after any potential treatment there.      Sincerely,    Williams Zepeda MD        D: 2022   T: 2022   MT: randa    Name:     STEVEN BAEGallo  MRN:      [id number]        Account:      [id number]   :      1950           Service Date: 2022       Document: [id number]

## 2022-08-29 ENCOUNTER — HOSPITAL ENCOUNTER (OUTPATIENT)
Dept: CARDIAC REHAB | Facility: CLINIC | Age: 72
Discharge: HOME | End: 2022-08-29
Attending: THORACIC SURGERY (CARDIOTHORACIC VASCULAR SURGERY)
Payer: COMMERCIAL

## 2022-08-29 PROCEDURE — 93798 PHYS/QHP OP CAR RHAB W/ECG: CPT | Performed by: OCCUPATIONAL THERAPIST

## 2022-08-31 ENCOUNTER — HOSPITAL ENCOUNTER (OUTPATIENT)
Dept: CARDIAC REHAB | Facility: CLINIC | Age: 72
Discharge: HOME | End: 2022-08-31
Attending: THORACIC SURGERY (CARDIOTHORACIC VASCULAR SURGERY)
Payer: COMMERCIAL

## 2022-08-31 PROCEDURE — 93798 PHYS/QHP OP CAR RHAB W/ECG: CPT

## 2022-09-01 ENCOUNTER — HOSPITAL ENCOUNTER (OUTPATIENT)
Dept: CARDIAC REHAB | Facility: CLINIC | Age: 72
Discharge: HOME | End: 2022-09-01
Attending: THORACIC SURGERY (CARDIOTHORACIC VASCULAR SURGERY)
Payer: COMMERCIAL

## 2022-09-01 PROCEDURE — 93798 PHYS/QHP OP CAR RHAB W/ECG: CPT | Performed by: REHABILITATION PRACTITIONER

## 2022-09-06 ENCOUNTER — HOSPITAL ENCOUNTER (OUTPATIENT)
Dept: CARDIAC REHAB | Facility: CLINIC | Age: 72
Discharge: HOME | End: 2022-09-06
Attending: THORACIC SURGERY (CARDIOTHORACIC VASCULAR SURGERY)
Payer: COMMERCIAL

## 2022-09-06 PROCEDURE — 93798 PHYS/QHP OP CAR RHAB W/ECG: CPT | Performed by: REHABILITATION PRACTITIONER

## 2022-09-07 ENCOUNTER — HOSPITAL ENCOUNTER (OUTPATIENT)
Dept: CARDIAC REHAB | Facility: CLINIC | Age: 72
Discharge: HOME | End: 2022-09-07
Attending: THORACIC SURGERY (CARDIOTHORACIC VASCULAR SURGERY)
Payer: COMMERCIAL

## 2022-09-07 PROCEDURE — 93798 PHYS/QHP OP CAR RHAB W/ECG: CPT | Performed by: REHABILITATION PRACTITIONER

## 2022-09-09 ENCOUNTER — HOSPITAL ENCOUNTER (OUTPATIENT)
Dept: CARDIAC REHAB | Facility: CLINIC | Age: 72
Discharge: HOME | End: 2022-09-09
Attending: THORACIC SURGERY (CARDIOTHORACIC VASCULAR SURGERY)
Payer: COMMERCIAL

## 2022-09-09 PROCEDURE — 93798 PHYS/QHP OP CAR RHAB W/ECG: CPT | Performed by: REHABILITATION PRACTITIONER

## 2022-09-12 ENCOUNTER — HOSPITAL ENCOUNTER (OUTPATIENT)
Dept: CARDIAC REHAB | Facility: CLINIC | Age: 72
Discharge: HOME | End: 2022-09-12
Attending: THORACIC SURGERY (CARDIOTHORACIC VASCULAR SURGERY)
Payer: COMMERCIAL

## 2022-09-12 PROCEDURE — 93798 PHYS/QHP OP CAR RHAB W/ECG: CPT | Performed by: REHABILITATION PRACTITIONER

## 2022-09-16 ENCOUNTER — HOSPITAL ENCOUNTER (OUTPATIENT)
Dept: CARDIAC REHAB | Facility: CLINIC | Age: 72
Discharge: HOME | End: 2022-09-16
Attending: THORACIC SURGERY (CARDIOTHORACIC VASCULAR SURGERY)
Payer: COMMERCIAL

## 2022-09-16 PROCEDURE — 93798 PHYS/QHP OP CAR RHAB W/ECG: CPT

## 2022-09-19 ENCOUNTER — HOSPITAL ENCOUNTER (OUTPATIENT)
Dept: CARDIAC REHAB | Facility: CLINIC | Age: 72
Discharge: HOME | End: 2022-09-19
Attending: THORACIC SURGERY (CARDIOTHORACIC VASCULAR SURGERY)
Payer: COMMERCIAL

## 2022-09-19 PROCEDURE — 93798 PHYS/QHP OP CAR RHAB W/ECG: CPT | Performed by: CLINICAL EXERCISE PHYSIOLOGIST

## 2022-09-21 ENCOUNTER — HOSPITAL ENCOUNTER (OUTPATIENT)
Dept: CARDIAC REHAB | Facility: CLINIC | Age: 72
Discharge: HOME | End: 2022-09-21
Attending: THORACIC SURGERY (CARDIOTHORACIC VASCULAR SURGERY)
Payer: COMMERCIAL

## 2022-09-21 PROCEDURE — 93798 PHYS/QHP OP CAR RHAB W/ECG: CPT | Performed by: REHABILITATION PRACTITIONER

## 2022-09-23 ENCOUNTER — HOSPITAL ENCOUNTER (OUTPATIENT)
Dept: CARDIAC REHAB | Facility: CLINIC | Age: 72
Discharge: HOME | End: 2022-09-23
Attending: THORACIC SURGERY (CARDIOTHORACIC VASCULAR SURGERY)
Payer: COMMERCIAL

## 2022-09-23 PROCEDURE — 93798 PHYS/QHP OP CAR RHAB W/ECG: CPT | Performed by: REHABILITATION PRACTITIONER

## 2022-09-26 ENCOUNTER — HOSPITAL ENCOUNTER (OUTPATIENT)
Dept: CARDIAC REHAB | Facility: CLINIC | Age: 72
Discharge: HOME | End: 2022-09-26
Attending: THORACIC SURGERY (CARDIOTHORACIC VASCULAR SURGERY)
Payer: COMMERCIAL

## 2022-09-26 PROCEDURE — 93798 PHYS/QHP OP CAR RHAB W/ECG: CPT | Performed by: OCCUPATIONAL THERAPIST

## 2022-09-30 ENCOUNTER — HOSPITAL ENCOUNTER (OUTPATIENT)
Dept: CARDIAC REHAB | Facility: CLINIC | Age: 72
Discharge: HOME | End: 2022-09-30
Attending: THORACIC SURGERY (CARDIOTHORACIC VASCULAR SURGERY)
Payer: COMMERCIAL

## 2022-09-30 PROCEDURE — 93798 PHYS/QHP OP CAR RHAB W/ECG: CPT | Performed by: CLINICAL EXERCISE PHYSIOLOGIST

## 2022-10-05 ENCOUNTER — HOSPITAL ENCOUNTER (OUTPATIENT)
Dept: CARDIAC REHAB | Facility: CLINIC | Age: 72
Discharge: HOME | End: 2022-10-05
Attending: THORACIC SURGERY (CARDIOTHORACIC VASCULAR SURGERY)
Payer: COMMERCIAL

## 2022-10-05 PROCEDURE — 93798 PHYS/QHP OP CAR RHAB W/ECG: CPT | Performed by: REHABILITATION PRACTITIONER

## 2022-10-06 ENCOUNTER — HOSPITAL ENCOUNTER (OUTPATIENT)
Dept: CARDIAC REHAB | Facility: CLINIC | Age: 72
Discharge: HOME | End: 2022-10-06
Attending: THORACIC SURGERY (CARDIOTHORACIC VASCULAR SURGERY)
Payer: COMMERCIAL

## 2022-10-06 PROCEDURE — 93798 PHYS/QHP OP CAR RHAB W/ECG: CPT | Performed by: OCCUPATIONAL THERAPIST

## 2022-10-07 ENCOUNTER — HOSPITAL ENCOUNTER (OUTPATIENT)
Dept: CARDIAC REHAB | Facility: CLINIC | Age: 72
Discharge: HOME | End: 2022-10-07
Attending: THORACIC SURGERY (CARDIOTHORACIC VASCULAR SURGERY)
Payer: COMMERCIAL

## 2022-10-07 PROCEDURE — 93798 PHYS/QHP OP CAR RHAB W/ECG: CPT | Performed by: REHABILITATION PRACTITIONER

## 2022-10-10 ENCOUNTER — HOSPITAL ENCOUNTER (OUTPATIENT)
Dept: CARDIAC REHAB | Facility: CLINIC | Age: 72
Discharge: HOME | End: 2022-10-10
Attending: THORACIC SURGERY (CARDIOTHORACIC VASCULAR SURGERY)
Payer: COMMERCIAL

## 2022-10-10 PROCEDURE — 93798 PHYS/QHP OP CAR RHAB W/ECG: CPT | Performed by: REHABILITATION PRACTITIONER

## 2022-10-12 ENCOUNTER — HOSPITAL ENCOUNTER (OUTPATIENT)
Dept: CARDIAC REHAB | Facility: CLINIC | Age: 72
Discharge: HOME | End: 2022-10-12
Attending: THORACIC SURGERY (CARDIOTHORACIC VASCULAR SURGERY)
Payer: COMMERCIAL

## 2022-10-12 PROCEDURE — 93798 PHYS/QHP OP CAR RHAB W/ECG: CPT | Performed by: REHABILITATION PRACTITIONER

## 2022-10-14 ENCOUNTER — HOSPITAL ENCOUNTER (OUTPATIENT)
Dept: CARDIAC REHAB | Facility: CLINIC | Age: 72
Discharge: HOME | End: 2022-10-14
Attending: THORACIC SURGERY (CARDIOTHORACIC VASCULAR SURGERY)
Payer: COMMERCIAL

## 2022-10-14 PROCEDURE — 93798 PHYS/QHP OP CAR RHAB W/ECG: CPT

## 2022-10-17 ENCOUNTER — HOSPITAL ENCOUNTER (OUTPATIENT)
Dept: CARDIAC REHAB | Facility: CLINIC | Age: 72
Discharge: HOME | End: 2022-10-17
Attending: THORACIC SURGERY (CARDIOTHORACIC VASCULAR SURGERY)
Payer: COMMERCIAL

## 2022-10-17 PROCEDURE — 93798 PHYS/QHP OP CAR RHAB W/ECG: CPT

## 2022-10-21 ENCOUNTER — HOSPITAL ENCOUNTER (OUTPATIENT)
Dept: CARDIAC REHAB | Facility: CLINIC | Age: 72
Discharge: HOME | End: 2022-10-21
Attending: THORACIC SURGERY (CARDIOTHORACIC VASCULAR SURGERY)
Payer: COMMERCIAL

## 2022-10-21 PROCEDURE — 93798 PHYS/QHP OP CAR RHAB W/ECG: CPT | Performed by: OCCUPATIONAL THERAPIST

## 2022-10-21 PROCEDURE — 93797 PHYS/QHP OP CAR RHAB WO ECG: CPT | Performed by: OCCUPATIONAL THERAPIST

## 2022-11-08 ENCOUNTER — OFFICE VISIT (OUTPATIENT)
Dept: FAMILY MEDICINE | Facility: CLINIC | Age: 72
End: 2022-11-08
Payer: COMMERCIAL

## 2022-11-08 VITALS
RESPIRATION RATE: 20 BRPM | OXYGEN SATURATION: 99 % | HEIGHT: 66 IN | HEART RATE: 68 BPM | WEIGHT: 133 LBS | TEMPERATURE: 97.3 F | SYSTOLIC BLOOD PRESSURE: 135 MMHG | DIASTOLIC BLOOD PRESSURE: 78 MMHG | BODY MASS INDEX: 21.38 KG/M2

## 2022-11-08 DIAGNOSIS — T14.8XXA HEMATOMA OF SKIN: ICD-10-CM

## 2022-11-08 DIAGNOSIS — I48.0 PAROXYSMAL ATRIAL FIBRILLATION (H): ICD-10-CM

## 2022-11-08 DIAGNOSIS — I25.10 MILD CAD: ICD-10-CM

## 2022-11-08 DIAGNOSIS — M89.9 DISORDER OF BONE AND CARTILAGE: ICD-10-CM

## 2022-11-08 DIAGNOSIS — E78.5 HYPERLIPIDEMIA LDL GOAL <70: ICD-10-CM

## 2022-11-08 DIAGNOSIS — R59.1 LYMPHADENOPATHY: ICD-10-CM

## 2022-11-08 DIAGNOSIS — I10 PRIMARY HYPERTENSION: ICD-10-CM

## 2022-11-08 DIAGNOSIS — M94.9 DISORDER OF BONE AND CARTILAGE: ICD-10-CM

## 2022-11-08 DIAGNOSIS — Z00.00 ROUTINE GENERAL MEDICAL EXAMINATION AT A HEALTH CARE FACILITY: Primary | ICD-10-CM

## 2022-11-08 DIAGNOSIS — Z98.890 S/P TRICUSPID VALVE REPAIR: ICD-10-CM

## 2022-11-08 DIAGNOSIS — N63.12 BREAST LUMP ON RIGHT SIDE AT 2 O'CLOCK POSITION: ICD-10-CM

## 2022-11-08 DIAGNOSIS — Z98.890 S/P COIL EMBOLIZATION OF CEREBRAL ANEURYSM: ICD-10-CM

## 2022-11-08 DIAGNOSIS — Z98.890 S/P MITRAL VALVE REPAIR: ICD-10-CM

## 2022-11-08 PROBLEM — R07.9 CHEST PAIN, UNSPECIFIED TYPE: Status: RESOLVED | Noted: 2022-05-17 | Resolved: 2022-11-08

## 2022-11-08 PROBLEM — R20.2 FACIAL PARESTHESIA: Status: RESOLVED | Noted: 2022-05-17 | Resolved: 2022-11-08

## 2022-11-08 PROBLEM — R51.9 CHRONIC INTRACTABLE HEADACHE, UNSPECIFIED HEADACHE TYPE: Status: RESOLVED | Noted: 2022-05-17 | Resolved: 2022-11-08

## 2022-11-08 PROBLEM — G89.29 CHRONIC INTRACTABLE HEADACHE, UNSPECIFIED HEADACHE TYPE: Status: RESOLVED | Noted: 2022-05-17 | Resolved: 2022-11-08

## 2022-11-08 PROCEDURE — G0439 PPPS, SUBSEQ VISIT: HCPCS | Performed by: INTERNAL MEDICINE

## 2022-11-08 PROCEDURE — 99214 OFFICE O/P EST MOD 30 MIN: CPT | Mod: 25 | Performed by: INTERNAL MEDICINE

## 2022-11-08 RX ORDER — ATORVASTATIN CALCIUM 20 MG/1
20 TABLET, FILM COATED ORAL DAILY
Qty: 90 TABLET | Refills: 3 | Status: SHIPPED | OUTPATIENT
Start: 2022-11-08 | End: 2023-12-05

## 2022-11-08 RX ORDER — METOPROLOL TARTRATE 50 MG
50 TABLET ORAL 2 TIMES DAILY
Qty: 180 TABLET | Refills: 3 | Status: SHIPPED | OUTPATIENT
Start: 2022-11-08 | End: 2023-10-31

## 2022-11-08 RX ORDER — CLOPIDOGREL BISULFATE 75 MG/1
75 TABLET ORAL DAILY
COMMUNITY
Start: 2022-10-28 | End: 2023-12-05

## 2022-11-08 RX ORDER — LOSARTAN POTASSIUM 25 MG/1
25 TABLET ORAL DAILY
Qty: 90 TABLET | Refills: 3 | Status: SHIPPED | OUTPATIENT
Start: 2022-11-08 | End: 2023-10-30

## 2022-11-08 ASSESSMENT — ENCOUNTER SYMPTOMS
CHILLS: 0
SORE THROAT: 0
COUGH: 0
FREQUENCY: 0
PARESTHESIAS: 0
ARTHRALGIAS: 0
HEARTBURN: 0
HEMATOCHEZIA: 0
FEVER: 0
HEMATURIA: 0
JOINT SWELLING: 0
DIZZINESS: 0
NAUSEA: 0
DYSURIA: 0
SHORTNESS OF BREATH: 0
WEAKNESS: 0
DIARRHEA: 0
MYALGIAS: 0
ABDOMINAL PAIN: 0
EYE PAIN: 0
PALPITATIONS: 0
CONSTIPATION: 0
BREAST MASS: 0
NERVOUS/ANXIOUS: 0
HEADACHES: 0

## 2022-11-08 ASSESSMENT — PAIN SCALES - GENERAL: PAINLEVEL: NO PAIN (0)

## 2022-11-08 ASSESSMENT — ACTIVITIES OF DAILY LIVING (ADL): CURRENT_FUNCTION: NO ASSISTANCE NEEDED

## 2022-11-08 NOTE — PROGRESS NOTES
"SUBJECTIVE:   Amaya is a 72 year old who presents for Preventive Visit.  This very pleasant 72 years old woman has gone through a lot since her last visit  Upon review of records from [facility]  she was complaining of significant shortness of breath, orthopnea and paroxysmal nocturnal dyspnea comment on physical exam showed a significant 4/6 holosystolic murmur at the apex an echocardiogram findings consistent with severe mitral regurgitation. She was scheduled initially for coiling of the intracranial aneurysm. However, this procedure was deemed to be non urgent/emergent and was delayed, subsequently patient had coronary angiogram conducted on 08/05 showing no coronary artery disease and she was subsequently scheduled for surgery. She had an uneventful course and only spent 4 days in the hospital common 1 day in the ICU.    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  No    Healthy Habits:     In general, how would you rate your overall health?  Good    Frequency of exercise:  6-7 days/week    Duration of exercise:  45-60 minutes    Do you usually eat at least 4 servings of fruit and vegetables a day, include whole grains    & fiber and avoid regularly eating high fat or \"junk\" foods?  Yes    Taking medications regularly:  Yes    Barriers to taking medications:  None    Medication side effects:  None    Ability to successfully perform activities of daily " living:  No assistance needed    Home Safety:  No safety concerns identified    Hearing Impairment:  No hearing concerns    In the past 6 months, have you been bothered by leaking of urine?  No    In general, how would you rate your overall mental or emotional health?  Good      PHQ-2 Total Score: 0    Additional concerns today:  Yes    Do you feel safe in your environment? Yes    Have you ever done Advance Care Planning? (For example, a Health Directive, POLST, or a discussion with a medical provider or your loved ones about your wishes): Yes, patient states has an Advance Care Planning document and will bring a copy to the clinic.       Fall risk  Fallen 2 or more times in the past year?: No  Any fall with injury in the past year?: No    Cognitive Screening   1) Repeat 3 items (Leader, Season, Table)    2) Clock draw: NORMAL  3) 3 item recall: Recalls 3 objects  Results: 3 items recalled: COGNITIVE IMPAIRMENT LESS LIKELY    Mini-CogTM Copyright S Debi. Licensed by the author for use in [facility]; reprinted with permission (soflor@[facility]). All rights reserved.      Do you have sleep apnea, excessive snoring or daytime drowsiness?: no    Reviewed and updated as needed this visit by clinical staff   Tobacco  Allergies  Meds  Problems    Fam Hx          Reviewed and updated as needed this visit by Provider    Allergies  Meds  Problems    Fam Hx         Social History     Tobacco Use     Smoking status: Never     Smokeless tobacco: Never   Substance Use Topics     Alcohol use: Yes     Comment: 1 glass wine/day     If you drink alcohol do you typically have >3 drinks per day or >7 drinks per week? No    Alcohol Use 11/8/2022   Prescreen: >3 drinks/day or >7 drinks/week? No   Prescreen: >3 drinks/day or >7 drinks/week? -               Current providers sharing in care for this patient include:   Patient Care Team:  Carline Palm MD as PCP - General (Internal Medicine)  Josh Simental MD as  Assigned Heart and Vascular Provider  Carline Palm MD as Assigned PCP  Jose Corado MD as Assigned Musculoskeletal Provider  Williams Zepeda MD as Assigned Neuroscience Provider  Rito Schwartz EP as Cardiac Rehabilitation Therapist    The following health maintenance items are reviewed in Epic and correct as of today:  Health Maintenance   Topic Date Due     CMP  10/01/2022     LIPID  10/01/2022     MAMMO SCREENING  04/18/2023     ANNUAL REVIEW OF HM ORDERS  06/02/2023     CBC  08/19/2023     MEDICARE ANNUAL WELLNESS VISIT  11/08/2023     FALL RISK ASSESSMENT  11/08/2023     DTAP/TDAP/TD IMMUNIZATION (2 - Td or Tdap) 11/01/2024     COLORECTAL CANCER SCREENING  06/30/2027     ADVANCE CARE PLANNING  11/08/2027     DEXA  06/14/2036     HEPATITIS C SCREENING  Completed     PHQ-2 (once per calendar year)  Completed     INFLUENZA VACCINE  Completed     Pneumococcal Vaccine: 65+ Years  Completed     ZOSTER IMMUNIZATION  Completed     COVID-19 Vaccine  Completed     IPV IMMUNIZATION  Aged Out     MENINGITIS IMMUNIZATION  Aged Out     BP Readings from Last 3 Encounters:   11/08/22 135/78   08/19/22 118/72   07/25/22 130/84    Wt Readings from Last 3 Encounters:   11/08/22 60.3 kg (133 lb)   08/19/22 61.7 kg (136 lb)   07/25/22 63.5 kg (140 lb)                  Patient Active Problem List   Diagnosis     Premature beats     Mitral valve disorder     Advanced directives, counseling/discussion     SBO (small bowel obstruction) (H)     Family history of malignant neoplasm of breast     Disorder of bone and cartilage     Acute cystitis without hematuria     Urinary problem     Kidney stone     Dizziness     Paroxysmal atrial fibrillation (H)     Past Surgical History:   Procedure Laterality Date     ABDOMEN SURGERY  1979, 1984,1988    2 C-sections 1 laproscope for fibroids     CARDIAC VALVE SURGERY  08/08/2022     COLONOSCOPY  02/27/2012    Procedure:COLONOSCOPY; COLONOSCOPY; Surgeon:GINGER PARKS;  "Location: GI     COLONOSCOPY N/A 2017    Procedure: COLONOSCOPY;  colonoscopy;  Surgeon: Dez Siegel MD;  Location:  GI     CORONARY ANGIOGRAPHY ADULT ORDER  2014    3/18/14- Mild to moderate CAD, no interventions, treat medically.     ENT SURGERY  tonsils     GYN SURGERY  c sections     [facility] NONSPECIFIC PROCEDURE      S/P T&A     [facility] NONSPECIFIC PROCEDURE      LASER FIBROIDS     [facility] NONSPECIFIC PROCEDURE       x2       Social History     Tobacco Use     Smoking status: Never     Smokeless tobacco: Never   Substance Use Topics     Alcohol use: Yes     Comment: 1 glass wine/day     Family History   Problem Relation Age of Onset     C.A.D. Mother      Parkinsonism Mother 80     Breast Cancer Mother 80     Hypertension Mother      Osteoporosis Mother      Circulatory Maternal Grandmother         MVP     Aneurysm Paternal Grandmother 65     Alcohol/Drug Daughter 30     Cerebrovascular Disease Son 40        stroke, PFO     Circulatory Maternal Aunt         MVP     Breast Cancer Maternal Aunt 80     Breast Cancer Other         Maternal Aunt         Current Outpatient Medications   Medication Sig Dispense Refill     aspirin (ASA) 325 MG EC tablet Take 1 tablet (325 mg) by mouth every 6 hours as needed for moderate pain       atorvastatin (LIPITOR) 20 MG tablet Take 1 tablet (20 mg) by mouth daily 90 tablet 3     Calcium Carbonate-Vitamin D (CALCIUM + D PO) Take 1 chew tab by mouth 2 times daily 1300 mg daily  (650 each)       clopidogrel (PLAVIX) 75 MG tablet Take 1 tablet (75 mg) by mouth daily       losartan (COZAAR) 25 MG tablet Take 1 tablet (25 mg) by mouth daily 90 tablet 3     metoprolol tartrate (LOPRESSOR) 50 MG tablet Take 1 tablet (50 mg) by mouth 2 times daily 180 tablet 3     NONFORMULARY nutrafol       Probiotic Product (PROBIOTIC PO)        tretinoin (RETIN-A) 0.025 % external cream        Allergies   Allergen Reactions     Penicillins      \"severe migraines\" " "            Review of Systems  10 point ROS of systems including Constitutional, Eyes, Respiratory, Cardiovascular, Gastroenterology, Genitourinary, Integumentary, Muscularskeletal, Psychiatric were all negative except for pertinent positives noted in my HPI.      OBJECTIVE:   /78   Pulse 68   Temp 97.3  F (36.3  C) (Temporal)   Resp 20   Ht 1.676 m (5' 5.98\")   Wt 60.3 kg (133 lb)   SpO2 99%   BMI 21.48 kg/m   Estimated body mass index is 21.48 kg/m  as calculated from the following:    Height as of this encounter: 1.676 m (5' 5.98\").    Weight as of this encounter: 60.3 kg (133 lb).  Physical Exam  GENERAL APPEARANCE: healthy, alert and no distress  EYES: Eyes grossly normal to inspection, PERRL and conjunctivae and sclerae normal  HENT: ear canals and TM's normal, nose and mouth without ulcers or lesions, oropharynx clear and oral mucous membranes moist  NECK: Left cervical 1 x 1 cm lymph node, no asymmetry, masses, or scars and thyroid normal to palpation  RESP: lungs clear to auscultation - no rales, rhonchi or wheezes  BREAST: Right breast 2 to 3 o'clock position on lump which is cystic but has some hard areas   normal without masses, tenderness or nipple discharge and no palpable axillary masses or adenopathy  CV: Sternotomy scar is healthy regular rate and rhythm, normal S1 S2, no S3 or S4, no murmur, click or rub, no peripheral edema and peripheral pulses strong  ABDOMEN: soft, nontender, no hepatosplenomegaly, no masses and bowel sounds normal  MS: no musculoskeletal defects are noted and gait is age appropriate without ataxia  SKIN: Huge ecchymosis and abdominal hematoma in right groin suture area shows granuloma  NEURO: Normal strength and tone, sensory exam grossly normal, mentation intact and speech normal  PSYCH: mentation appears normal and affect normal/bright    Left big toe a little bit discolored but not onychomycosis    ASSESSMENT / PLAN:   Amaya was seen today for " physical.    Diagnoses and all orders for this visit:     Medicare preventive visit  72 years old very pleasant woman who is doing very well postoperatively from a mitral and tricuspid valve repair and also coiling of her aneurysm  But there are a lot of issues as addressed below and she is up-to-date on immunization  Reassured about the toenail changes  Primary hypertension  -     COMPREHENSIVE METABOLIC PANEL; Future  -     losartan (COZAAR) 25 MG tablet; Take 1 tablet (25 mg) by mouth daily  -     metoprolol tartrate (LOPRESSOR) 50 MG tablet; Take 1 tablet (50 mg) by mouth 2 times daily    S/P mitral valve repair  Comments:  open mitral valve repair, folding repair of a flail lateral scallop (P1).   Placement of a 63 mm Posterior [employer] flexible band  Tricuspid valve repair     S/P tricuspid valve repair  As above she still has elevated pressures on the right side which will hopefully improve  S/P coil embolization of cerebral aneurysm  I have checked with neuroradiology that patient can still undergo MRI and I will communicate with the patient  Paroxysmal atrial fibrillation (H)  Remains in normal sinus rhythm  Hyperlipidemia LDL goal <70  She has no coronary disease and therefore do we even need statins?  I will leave that up to her cardiologist who she sees in December  -     COMPREHENSIVE METABOLIC PANEL; Future  -     Lipid panel reflex to direct LDL Non-fasting; Future  -     atorvastatin (LIPITOR) 20 MG tablet; Take 1 tablet (20 mg) by mouth daily    Disorder of bone and cartilage  We will check a bone density scan and mammogram as above  Mild CAD she does not have  -     metoprolol tartrate (LOPRESSOR) 50 MG tablet; Take 1 tablet (50 mg) by mouth 2 times daily  She actually has no coronary disease but she will continue metoprolol  Lymphadenopathy left cervical  This needs to be checked in 6 weeks and CRP or ESR will not be revealing  Breast lump on right side at 2 o'clock position  Patient is known to  "have cysts in the breast  -     MA Diagnostic Digital Right; Future  -     US Breast Right Limited 1-3 Quadrants; Future    Hematoma of skin    Reassured about that but we will check her hemoglobin          COUNSELING:  Recheck the lymph node and mammogram today    Estimated body mass index is 21.48 kg/m  as calculated from the following:    Height as of this encounter: 1.676 m (5' 5.98\").    Weight as of this encounter: 60.3 kg (133 lb).        She reports that she has never smoked. She has never used smokeless tobacco.      Appropriate preventive services were discussed with this patient, including applicable screening as appropriate for cardiovascular disease, diabetes, osteopenia/osteoporosis, and glaucoma.  As appropriate for age/gender, discussed screening for colorectal cancer,  breast cancer, and cervical cancer. Checklist reviewing preventive services available has been given to the patient.    Reviewed patients plan of care and provided an AVS. The Complex Care Plan (for patients with higher acuity and needing more deliberate coordination of services) for Amaya meets the Care Plan requirement. This Care Plan has been established and reviewed with the Patient.    Counseling Resources:  ATP IV Guidelines  Pooled Cohorts Equation Calculator  Breast Cancer Risk Calculator  Breast Cancer: Medication to Reduce Risk  FRAX Risk Assessment  ICSI Preventive Guidelines  Dietary Guidelines for Americans, 2010  [employer]'s MyPlate  ASA Prophylaxis  Lung CA Screening    Carline Palm MD  [facility]    Identified Health Risks:  "

## 2022-11-08 NOTE — PATIENT INSTRUCTIONS
Patient Education   Personalized Prevention Plan  You are due for the preventive services outlined below.  Your care team is available to assist you in scheduling these services.  If you have already completed any of these items, please share that information with your care team to update in your medical record.  Health Maintenance Due   Topic Date Due     Annual Wellness Visit  10/01/2022     Comprehensive Metabolic Panel  10/01/2022     Cholesterol Lab  10/01/2022

## 2022-11-09 ENCOUNTER — LAB (OUTPATIENT)
Dept: LAB | Facility: CLINIC | Age: 72
End: 2022-11-09
Payer: COMMERCIAL

## 2022-11-09 DIAGNOSIS — E78.5 HYPERLIPIDEMIA LDL GOAL <70: ICD-10-CM

## 2022-11-09 DIAGNOSIS — I10 PRIMARY HYPERTENSION: ICD-10-CM

## 2022-11-09 LAB
ALBUMIN SERPL-MCNC: 3.8 G/DL (ref 3.4–5)
ALP SERPL-CCNC: 83 U/L (ref 40–150)
ALT SERPL W P-5'-P-CCNC: 28 U/L (ref 0–50)
ANION GAP SERPL CALCULATED.3IONS-SCNC: 3 MMOL/L (ref 3–14)
AST SERPL W P-5'-P-CCNC: 21 U/L (ref 0–45)
BILIRUB SERPL-MCNC: 0.9 MG/DL (ref 0.2–1.3)
BUN SERPL-MCNC: 16 MG/DL (ref 7–30)
CALCIUM SERPL-MCNC: 9.5 MG/DL (ref 8.5–10.1)
CHLORIDE BLD-SCNC: 107 MMOL/L (ref 94–109)
CHOLEST SERPL-MCNC: 178 MG/DL
CO2 SERPL-SCNC: 29 MMOL/L (ref 20–32)
CREAT SERPL-MCNC: 0.91 MG/DL (ref 0.52–1.04)
FASTING STATUS PATIENT QL REPORTED: YES
GFR SERPL CREATININE-BSD FRML MDRD: 67 ML/MIN/1.73M2
GLUCOSE BLD-MCNC: 98 MG/DL (ref 70–99)
HDLC SERPL-MCNC: 77 MG/DL
LDLC SERPL CALC-MCNC: 83 MG/DL
NONHDLC SERPL-MCNC: 101 MG/DL
POTASSIUM BLD-SCNC: 4.2 MMOL/L (ref 3.4–5.3)
PROT SERPL-MCNC: 7.1 G/DL (ref 6.8–8.8)
SODIUM SERPL-SCNC: 139 MMOL/L (ref 133–144)
TRIGL SERPL-MCNC: 92 MG/DL

## 2022-11-09 PROCEDURE — 36415 COLL VENOUS BLD VENIPUNCTURE: CPT

## 2022-11-09 PROCEDURE — 80053 COMPREHEN METABOLIC PANEL: CPT

## 2022-11-09 PROCEDURE — 80061 LIPID PANEL: CPT

## 2022-11-16 ENCOUNTER — HOSPITAL ENCOUNTER (OUTPATIENT)
Dept: MAMMOGRAPHY | Facility: CLINIC | Age: 72
Discharge: HOME | End: 2022-11-16
Attending: INTERNAL MEDICINE
Payer: COMMERCIAL

## 2022-11-16 DIAGNOSIS — N63.12 BREAST LUMP ON RIGHT SIDE AT 2 O'CLOCK POSITION: ICD-10-CM

## 2022-11-16 PROCEDURE — 77061 BREAST TOMOSYNTHESIS UNI: CPT | Mod: RT

## 2022-11-16 PROCEDURE — 76642 ULTRASOUND BREAST LIMITED: CPT | Mod: RT

## 2023-04-11 ENCOUNTER — TRANSFERRED RECORDS (OUTPATIENT)
Dept: HEALTH INFORMATION MANAGEMENT | Facility: CLINIC | Age: 73
End: 2023-04-11
Payer: COMMERCIAL

## 2023-05-08 ENCOUNTER — TRANSFERRED RECORDS (OUTPATIENT)
Dept: HEALTH INFORMATION MANAGEMENT | Facility: CLINIC | Age: 73
End: 2023-05-08
Payer: COMMERCIAL

## 2023-05-23 ENCOUNTER — TRANSFERRED RECORDS (OUTPATIENT)
Dept: HEALTH INFORMATION MANAGEMENT | Facility: CLINIC | Age: 73
End: 2023-05-23
Payer: COMMERCIAL

## 2023-07-10 ENCOUNTER — TELEPHONE (OUTPATIENT)
Dept: FAMILY MEDICINE | Facility: CLINIC | Age: 73
End: 2023-07-10
Payer: COMMERCIAL

## 2023-07-10 NOTE — TELEPHONE ENCOUNTER
Reason for Call:  Appointment Request    Patient requesting this type of appt:  Preventive     Requested provider: Carline Palm    Reason patient unable to be scheduled: Not within requested timeframe    When does patient want to be seen/preferred time: November 2023    Comments: Patient is calling to get an annual wellness visit with Dr. Palm    Could we send this information to you in [facility] or would you prefer to receive a phone call?:   Patient would prefer a phone call   Okay to leave a detailed message?: Yes at Cell number on file:    Telephone Information:   Mobile [phone]       Call taken on 7/10/2023 at 12:37 PM by Gill Stringer

## 2023-10-30 DIAGNOSIS — I10 PRIMARY HYPERTENSION: ICD-10-CM

## 2023-10-30 RX ORDER — LOSARTAN POTASSIUM 25 MG/1
25 TABLET ORAL DAILY
Qty: 90 TABLET | Refills: 0 | Status: SHIPPED | OUTPATIENT
Start: 2023-10-30 | End: 2024-01-25

## 2023-10-31 DIAGNOSIS — I25.10 MILD CAD: ICD-10-CM

## 2023-10-31 DIAGNOSIS — I10 PRIMARY HYPERTENSION: ICD-10-CM

## 2023-10-31 RX ORDER — METOPROLOL TARTRATE 50 MG
50 TABLET ORAL 2 TIMES DAILY
Qty: 180 TABLET | Refills: 0 | Status: SHIPPED | OUTPATIENT
Start: 2023-10-31 | End: 2023-12-05

## 2023-10-31 NOTE — TELEPHONE ENCOUNTER
Prescription approved per [facility] Refill Protocol.  Deborah Mckeon, RN  [facility] Triage Nurse

## 2023-12-05 ENCOUNTER — OFFICE VISIT (OUTPATIENT)
Dept: FAMILY MEDICINE | Facility: CLINIC | Age: 73
End: 2023-12-05
Payer: COMMERCIAL

## 2023-12-05 VITALS
BODY MASS INDEX: 21.53 KG/M2 | WEIGHT: 134 LBS | RESPIRATION RATE: 16 BRPM | SYSTOLIC BLOOD PRESSURE: 115 MMHG | OXYGEN SATURATION: 100 % | HEIGHT: 66 IN | HEART RATE: 70 BPM | TEMPERATURE: 96.9 F | DIASTOLIC BLOOD PRESSURE: 80 MMHG

## 2023-12-05 DIAGNOSIS — I48.0 PAROXYSMAL ATRIAL FIBRILLATION (H): ICD-10-CM

## 2023-12-05 DIAGNOSIS — Z00.00 ENCOUNTER FOR MEDICARE ANNUAL WELLNESS EXAM: Primary | ICD-10-CM

## 2023-12-05 DIAGNOSIS — I10 PRIMARY HYPERTENSION: ICD-10-CM

## 2023-12-05 DIAGNOSIS — Z98.890 S/P TRICUSPID VALVE REPAIR: ICD-10-CM

## 2023-12-05 DIAGNOSIS — Z98.890 S/P MITRAL VALVE REPAIR: ICD-10-CM

## 2023-12-05 DIAGNOSIS — E78.5 HYPERLIPIDEMIA LDL GOAL <70: ICD-10-CM

## 2023-12-05 DIAGNOSIS — Z98.890 S/P COIL EMBOLIZATION OF CEREBRAL ANEURYSM: ICD-10-CM

## 2023-12-05 DIAGNOSIS — D50.0 ANEMIA DUE TO GI BLOOD LOSS: ICD-10-CM

## 2023-12-05 DIAGNOSIS — I25.10 MILD CAD: ICD-10-CM

## 2023-12-05 DIAGNOSIS — M81.0 AGE-RELATED OSTEOPOROSIS WITHOUT CURRENT PATHOLOGICAL FRACTURE: ICD-10-CM

## 2023-12-05 LAB
ALBUMIN SERPL BCG-MCNC: 4.5 G/DL (ref 3.5–5.2)
ALP SERPL-CCNC: 86 U/L (ref 40–150)
ALT SERPL W P-5'-P-CCNC: 18 U/L (ref 0–50)
ANION GAP SERPL CALCULATED.3IONS-SCNC: 11 MMOL/L (ref 7–15)
AST SERPL W P-5'-P-CCNC: 23 U/L (ref 0–45)
BILIRUB SERPL-MCNC: 0.7 MG/DL
BUN SERPL-MCNC: 13.5 MG/DL (ref 8–23)
CALCIUM SERPL-MCNC: 10.4 MG/DL (ref 8.8–10.2)
CHLORIDE SERPL-SCNC: 103 MMOL/L (ref 98–107)
CHOLEST SERPL-MCNC: 170 MG/DL
CREAT SERPL-MCNC: 0.82 MG/DL (ref 0.51–0.95)
DEPRECATED HCO3 PLAS-SCNC: 27 MMOL/L (ref 22–29)
EGFRCR SERPLBLD CKD-EPI 2021: 76 ML/MIN/1.73M2
ERYTHROCYTE [DISTWIDTH] IN BLOOD BY AUTOMATED COUNT: 11.9 % (ref 10–15)
FASTING STATUS PATIENT QL REPORTED: YES
GLUCOSE SERPL-MCNC: 94 MG/DL (ref 70–99)
HCT VFR BLD AUTO: 38.7 % (ref 35–47)
HDLC SERPL-MCNC: 80 MG/DL
HGB BLD-MCNC: 12.3 G/DL (ref 11.7–15.7)
IRON BINDING CAPACITY (ROCHE): 376 UG/DL (ref 240–430)
IRON SATN MFR SERPL: 18 % (ref 15–46)
IRON SERPL-MCNC: 68 UG/DL (ref 37–145)
LDLC SERPL CALC-MCNC: 70 MG/DL
MCH RBC QN AUTO: 28.5 PG (ref 26.5–33)
MCHC RBC AUTO-ENTMCNC: 31.8 G/DL (ref 31.5–36.5)
MCV RBC AUTO: 90 FL (ref 78–100)
NONHDLC SERPL-MCNC: 90 MG/DL
PLATELET # BLD AUTO: 255 10E3/UL (ref 150–450)
POTASSIUM SERPL-SCNC: 4 MMOL/L (ref 3.4–5.3)
PROT SERPL-MCNC: 7.2 G/DL (ref 6.4–8.3)
RBC # BLD AUTO: 4.32 10E6/UL (ref 3.8–5.2)
SODIUM SERPL-SCNC: 141 MMOL/L (ref 135–145)
TRIGL SERPL-MCNC: 102 MG/DL
TSH SERPL DL<=0.005 MIU/L-ACNC: 0.6 UIU/ML (ref 0.3–4.2)
VIT B12 SERPL-MCNC: 502 PG/ML (ref 232–1245)
WBC # BLD AUTO: 5 10E3/UL (ref 4–11)

## 2023-12-05 PROCEDURE — 83550 IRON BINDING TEST: CPT | Performed by: INTERNAL MEDICINE

## 2023-12-05 PROCEDURE — 82607 VITAMIN B-12: CPT | Performed by: INTERNAL MEDICINE

## 2023-12-05 PROCEDURE — 80053 COMPREHEN METABOLIC PANEL: CPT | Performed by: INTERNAL MEDICINE

## 2023-12-05 PROCEDURE — G0439 PPPS, SUBSEQ VISIT: HCPCS | Performed by: INTERNAL MEDICINE

## 2023-12-05 PROCEDURE — 83540 ASSAY OF IRON: CPT | Performed by: INTERNAL MEDICINE

## 2023-12-05 PROCEDURE — 80061 LIPID PANEL: CPT | Performed by: INTERNAL MEDICINE

## 2023-12-05 PROCEDURE — 36415 COLL VENOUS BLD VENIPUNCTURE: CPT | Performed by: INTERNAL MEDICINE

## 2023-12-05 PROCEDURE — 85027 COMPLETE CBC AUTOMATED: CPT | Performed by: INTERNAL MEDICINE

## 2023-12-05 PROCEDURE — 84443 ASSAY THYROID STIM HORMONE: CPT | Performed by: INTERNAL MEDICINE

## 2023-12-05 PROCEDURE — 99214 OFFICE O/P EST MOD 30 MIN: CPT | Mod: 25 | Performed by: INTERNAL MEDICINE

## 2023-12-05 RX ORDER — ASPIRIN 81 MG/1
81 TABLET ORAL DAILY
COMMUNITY

## 2023-12-05 RX ORDER — ATORVASTATIN CALCIUM 20 MG/1
20 TABLET, FILM COATED ORAL DAILY
Qty: 90 TABLET | Refills: 3 | Status: SHIPPED | OUTPATIENT
Start: 2023-12-05 | End: 2024-08-26

## 2023-12-05 RX ORDER — METOPROLOL TARTRATE 50 MG
50 TABLET ORAL 2 TIMES DAILY
Qty: 180 TABLET | Refills: 0 | Status: SHIPPED | OUTPATIENT
Start: 2023-12-05 | End: 2024-03-22

## 2023-12-05 ASSESSMENT — PAIN SCALES - GENERAL: PAINLEVEL: NO PAIN (0)

## 2023-12-05 ASSESSMENT — ACTIVITIES OF DAILY LIVING (ADL): CURRENT_FUNCTION: NO ASSISTANCE NEEDED

## 2023-12-05 NOTE — PATIENT INSTRUCTIONS
Patient Education   Personalized Prevention Plan  You are due for the preventive services outlined below.  Your care team is available to assist you in scheduling these services.  If you have already completed any of these items, please share that information with your care team to update in your medical record.  Health Maintenance Due   Topic Date Due     ANNUAL REVIEW OF HM ORDERS  06/02/2023     Complete Blood Count  08/19/2023     Comprehensive Metabolic Panel  11/09/2023     Cholesterol Lab  11/09/2023     Annual Wellness Visit  11/08/2023

## 2023-12-05 NOTE — PROGRESS NOTES
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   The middle left anterior descending artery is 40% obstructed by a discrete lesion.   The distal left anterior descending artery is 20% obstructed by a tubular lesion.   The first diagonal branch is 50% obstructed by a discrete lesion.   The proximal circumflex artery is 70% obstructed by a discrete lesion.   The distal circumflex artery is 30% obstructed by a discrete lesion.   The first obtuse marginal is 20% obstructed by a discrete lesion.   The proximal right coronary artery is 10% obstructed by a discrete lesion.   The middle right coronary artery is 40% obstructed by a discrete lesion.     Are you in the first 12 months of your Medicare coverage?  No, Part B 11/01/2018    Healthy Habits:     In general, how would you rate your overall health?  Excellent    Frequency of exercise:  4-5 days/week    Duration of exercise:  45-60 minutes    Do you usually eat at least 4 servings of fruit and vegetables a day, include whole grains    & fiber and avoid regularly eating high fat or \"junk\" foods?  Yes    Taking medications regularly:  Yes    " Barriers to taking medications:  None    Medication side effects:  None    Ability to successfully perform activities of daily living:  No assistance needed    Home Safety:  Lack of grab bars in the bathroom    Hearing Impairment:  No hearing concerns    In the past 6 months, have you been bothered by leaking of urine?  No    In general, how would you rate your overall mental or emotional health?  Excellent    Additional concerns today:  No          Have you ever done Advance Care Planning? (For example, a Health Directive, POLST, or a discussion with a medical provider or your loved ones about your wishes): Yes, patient states has an Advance Care Planning document and will bring a copy to the clinic.       Fall risk  Fallen 2 or more times in the past year?: No  Any fall with injury in the past year?: No    Cognitive Screening   1) Repeat 3 items (Leader, Season, Table)    2) Clock draw: NORMAL  3) 3 item recall: Recalls 3 objects  Results: 3 items recalled: COGNITIVE IMPAIRMENT LESS LIKELY    Mini-CogTM Copyright RICHMOND Carrera. Licensed by the author for use in [facility]; reprinted with permission (zuleyma@[facility]). All rights reserved.      Do you have sleep apnea, excessive snoring or daytime drowsiness? : no    Reviewed and updated as needed this visit by clinical staff   Tobacco  Allergies  Meds  Problems  Med Hx   Fam Hx  Soc Hx        Reviewed and updated as needed this visit by Provider    Allergies  Meds  Problems  Med Hx   Fam Hx         Social History     Tobacco Use    Smoking status: Never    Smokeless tobacco: Never   Substance Use Topics    Alcohol use: Yes     Comment: 1 glass wine/day             11/8/2022    12:40 PM   Alcohol Use   Prescreen: >3 drinks/day or >7 drinks/week? No       Do you have a current opioid prescription? No  Do you use any other controlled substances or medications that are not prescribed by a provider? None              Current providers sharing in care for  this patient include:   Patient Care Team:  Carline Palm MD as PCP - General (Internal Medicine)  Josh Simental MD as Assigned Heart and Vascular Provider  Carline Palm MD as Assigned PCP  Williams Zepeda MD as Assigned Neuroscience Provider    The following health maintenance items are reviewed in Epic and correct as of today:  Health Maintenance   Topic Date Due    ANNUAL REVIEW OF HM ORDERS  06/02/2023    CMP  11/09/2023    LIPID  11/09/2023    MAMMO SCREENING  05/08/2024    DTAP/TDAP/TD IMMUNIZATION (2 - Td or Tdap) 11/01/2024    MEDICARE ANNUAL WELLNESS VISIT  12/05/2024    FALL RISK ASSESSMENT  12/05/2024    CBC  12/05/2024    COLORECTAL CANCER SCREENING  06/30/2027    ADVANCE CARE PLANNING  12/05/2028    DEXA  06/14/2036    HEPATITIS C SCREENING  Completed    PHQ-2 (once per calendar year)  Completed    INFLUENZA VACCINE  Completed    Pneumococcal Vaccine: 65+ Years  Completed    ZOSTER IMMUNIZATION  Completed    RSV VACCINE (Pregnancy & 60+)  Completed    COVID-19 Vaccine  Completed    IPV IMMUNIZATION  Aged Out    HPV IMMUNIZATION  Aged Out    MENINGITIS IMMUNIZATION  Aged Out    RSV MONOCLONAL ANTIBODY  Aged Out     BP Readings from Last 3 Encounters:   12/05/23 115/80   11/08/22 135/78   08/19/22 118/72    Wt Readings from Last 3 Encounters:   12/05/23 60.8 kg (134 lb)   11/08/22 60.3 kg (133 lb)   08/19/22 61.7 kg (136 lb)                  Patient Active Problem List   Diagnosis    Premature beats    Mitral valve disorder    Advanced directives, counseling/discussion    SBO (small bowel obstruction) (H)    Family history of malignant neoplasm of breast    Disorder of bone and cartilage    Acute cystitis without hematuria    Urinary problem    Kidney stone    Dizziness    Paroxysmal atrial fibrillation (H)     Past Surgical History:   Procedure Laterality Date    ABDOMEN SURGERY  1979, 1984,1988    2 C-sections 1 laproscope for fibroids    CARDIAC VALVE SURGERY  08/08/2022     "COLONOSCOPY  2012    Procedure:COLONOSCOPY; COLONOSCOPY; Surgeon:GINGER PARKS; Location: GI    COLONOSCOPY N/A 2017    Procedure: COLONOSCOPY;  colonoscopy;  Surgeon: Dez Siegel MD;  Location:  GI    CORONARY ANGIOGRAPHY ADULT ORDER  2014    3/18/14- Mild to moderate CAD, no interventions, treat medically.    ENT SURGERY  tonsils    GYN SURGERY  c sections    Z NONSPECIFIC PROCEDURE      S/P T&A    [facility] NONSPECIFIC PROCEDURE      LASER FIBROIDS    [facility] NONSPECIFIC PROCEDURE       x2       Social History     Tobacco Use    Smoking status: Never    Smokeless tobacco: Never   Substance Use Topics    Alcohol use: Yes     Comment: 1 glass wine/day     Family History   Problem Relation Age of Onset    C.A.D. Mother     Parkinsonism Mother 80    Breast Cancer Mother 80    Hypertension Mother     Osteoporosis Mother     Obesity Sister     Obesity Brother     Circulatory Maternal Grandmother         MVP    Aneurysm Paternal Grandmother 65    Alcohol/Drug Daughter 30    Cerebrovascular Disease Son 40        stroke, PFO    Circulatory Maternal Aunt         MVP    Breast Cancer Maternal Aunt 78    Breast Cancer Other         Maternal Aunt         Current Outpatient Medications   Medication Sig Dispense Refill    aspirin 81 MG EC tablet Take 81 mg by mouth daily      atorvastatin (LIPITOR) 20 MG tablet Take 1 tablet (20 mg) by mouth daily 90 tablet 3    Calcium Carbonate-Vitamin D (CALCIUM + D PO) Take 1 chew tab by mouth 2 times daily 1300 mg daily  (650 each)      losartan (COZAAR) 25 MG tablet TAKE 1 TABLET BY MOUTH EVERY DAY 90 tablet 0    metoprolol tartrate (LOPRESSOR) 50 MG tablet Take 1 tablet (50 mg) by mouth 2 times daily 180 tablet 0    Probiotic Product (PROBIOTIC PO)       tretinoin (RETIN-A) 0.025 % external cream        Allergies   Allergen Reactions    Penicillins      \"severe migraines\"             Review of Systems  10 point ROS of systems including " "Constitutional, Eyes, Respiratory, Cardiovascular, Gastroenterology, Genitourinary, Integumentary, Muscularskeletal, Psychiatric were all negative except for pertinent positives noted in my HPI.      OBJECTIVE:   /80   Pulse 70   Temp 96.9  F (36.1  C) (Temporal)   Resp 16   Ht 1.666 m (5' 5.6\")   Wt 60.8 kg (134 lb)   SpO2 100%   BMI 21.89 kg/m   Estimated body mass index is 21.89 kg/m  as calculated from the following:    Height as of this encounter: 1.666 m (5' 5.6\").    Weight as of this encounter: 60.8 kg (134 lb).  Physical Exam  GENERAL APPEARANCE: healthy, alert and no distress  EYES: Eyes grossly normal to inspection, PERRL and conjunctivae and sclerae normal  HENT: ear canals and TM's normal, nose and mouth without ulcers or lesions, oropharynx clear and oral mucous membranes moist  NECK: no adenopathy, no asymmetry, masses, or scars and thyroid normal to palpation  RESP: Sternotomy scar is healthy lungs clear to auscultation - no rales, rhonchi or wheezes  BREAST: normal without masses, tenderness or nipple discharge and no palpable axillary masses or adenopathy  CV: regular rate and rhythm, normal S1 S2, no S3 or S4, no murmur, click or rub, no peripheral edema and peripheral pulses strong  ABDOMEN: soft, nontender, no hepatosplenomegaly, no masses and bowel sounds normal  MS: no musculoskeletal defects are noted and gait is age appropriate without ataxia  SKIN: no suspicious lesions or rashes  NEURO: Normal strength and tone, sensory exam grossly normal, mentation intact and speech normal  PSYCH: mentation appears normal and affect normal/bright        ASSESSMENT / PLAN:   Amaya was seen today for physical.    Diagnoses and all orders for this visit:    Encounter for Medicare annual wellness exam  Patient has family history of breast cancer and had workup done at [facility] for genetic counseling  She will continue annual mammogram and colonoscopy is due in 2027  She is up-to-date on " vaccinations  Primary hypertension  -     metoprolol tartrate (LOPRESSOR) 50 MG tablet; Take 1 tablet (50 mg) by mouth 2 times daily  Patient is also on losartan 25 mg daily  Paroxysmal atrial fibrillation (H)  On metoprolol and no atrial fibrillation now and on baby aspirin  S/P coil embolization of cerebral aneurysm  Comments: After she was found to have sudden onset of dizziness found to have bibasilar aneurysm  S/p  microventin web device  Repeat CT advised in 2 years and on baby aspirin  Hyperlipidemia LDL goal <70  We might need to increase the dose of Lipitor  -     Comprehensive metabolic panel  -     Lipid panel reflex to direct LDL Fasting  -     atorvastatin (LIPITOR) 20 MG tablet; Take 1 tablet (20 mg) by mouth daily    S/P mitral valve repair  -     CBC with Platelets    At goal show excellent report and no symptoms  S/P tricuspid valve repair  As above no murmur either  Age-related osteoporosis without current pathological fracture  -     TSH with free T4 reflex  -     Vitamin B12  -     DX Hip/Pelvis/Spine; Future  She will schedule the DEXA scan  Mild CAD this is more than mild and is on statins and baby aspirin  The left main coronary artery is 10% obstructed by a discrete lesion.   The middle left anterior descending artery is 40% obstructed by a discrete lesion.   The distal left anterior descending artery is 20% obstructed by a tubular lesion.   The first diagonal branch is 50% obstructed by a discrete lesion.   The proximal circumflex artery is 70% obstructed by a discrete lesion.   The distal circumflex artery is 30% obstructed by a discrete lesion.   The first obtuse marginal is 20% obstructed by a discrete lesion.   The proximal right coronary artery is 10% obstructed by a discrete lesion.   The middle right coronary artery is 40% obstructed by a discrete lesion.   -     metoprolol tartrate (LOPRESSOR) 50 MG tablet; Take 1 tablet (50 mg) by mouth 2 times daily    Anemia due to GI blood  loss  -     CBC with Platelets    -     Vitamin B12  -     Iron and iron binding capacity; Future  -     Iron and iron binding capacity    Other orders  -     PRIMARY CARE FOLLOW-UP SCHEDULING; Future              COUNSELING:  Dexa scan        She reports that she has never smoked. She has never used smokeless tobacco.      Appropriate preventive services were discussed with this patient, including applicable screening as appropriate for fall prevention, nutrition, physical activity, Tobacco-use cessation, weight loss and cognition.  Checklist reviewing preventive services available has been given to the patient.    Reviewed patients plan of care and provided an AVS. The Complex Care Plan (for patients with higher acuity and needing more deliberate coordination of services) for Amaya meets the Care Plan requirement. This Care Plan has been established and reviewed with the Patient.      Carline Palm MD  [facility]    Identified Health Risks:

## 2023-12-05 NOTE — NURSING NOTE
"BP:  BP (!) 162/80   Pulse 70   Temp 96.9  F (36.1  C) (Temporal)   Resp 16   Ht 1.666 m (5' 5.6\")   Wt 60.8 kg (134 lb)   SpO2 100%   BMI 21.89 kg/m       70  Disposition: provider notified while patient in the clinic   "

## 2024-01-25 DIAGNOSIS — I10 PRIMARY HYPERTENSION: ICD-10-CM

## 2024-01-25 RX ORDER — LOSARTAN POTASSIUM 25 MG/1
25 TABLET ORAL DAILY
Qty: 90 TABLET | Refills: 0 | Status: SHIPPED | OUTPATIENT
Start: 2024-01-25 | End: 2024-04-19

## 2024-02-05 ENCOUNTER — TELEPHONE (OUTPATIENT)
Dept: FAMILY MEDICINE | Facility: CLINIC | Age: 74
End: 2024-02-05
Payer: COMMERCIAL

## 2024-02-05 DIAGNOSIS — L65.9 HAIR LOSS: Primary | ICD-10-CM

## 2024-02-05 NOTE — TELEPHONE ENCOUNTER
Patient calling stating she has spoken with PCP before regarding Hair Loss. She is wanting a referral to dermatology in [place] with [facility].     Referral pended for your Review.     Please fax to #[phone].    Please call patient after faxed.

## 2024-03-01 ENCOUNTER — NURSE TRIAGE (OUTPATIENT)
Dept: FAMILY MEDICINE | Facility: CLINIC | Age: 74
End: 2024-03-01
Payer: COMMERCIAL

## 2024-03-01 NOTE — TELEPHONE ENCOUNTER
"Nurse Triage SBAR    Is this a 2nd Level Triage? NO    Situation: Pt has soft stools and abdominal \"discomfort\" for 6 weeks.     Background: [place] going [place] two [place] and wants to be evaluated before she leaves.     Assessment: Denies acute diet changes, watery stools fever or rectal bleed.     Protocol Recommended Disposition:   SEE PCP WITHIN 3 DAYS:     Recommendation: Schedule appt for evaluation. Pt is out of town now but agreed to schedule OV next week.         Does the patient meet one of the following criteria for ADS visit consideration? No      Reason for Disposition   [1] Abnormal color is unexplained AND [2] persists > 24 hours    Additional Information   Negative: Rectal bleeding, bloody stools, or blood in stool (bowel movement)   Negative: Black or tarry bowel movements   Negative: [1] Stool color is black (not dark green) AND [2] patient hasn't swallowed substance that causes black stools (e.g., Pepto-Bismol, iron pills)   Negative: Diarrhea stools (i.e., watery stools, or increase in the frequency and looseness of stools)   Negative: [1] Abdomen pain is main symptom AND [2] male   Negative: [1] Abdomen pain is main symptom AND [2] adult female   Negative: Yellow eyes (jaundice)   Negative: Patient sounds very sick or weak to the triager   Negative: [1] Stool is light gray or whitish AND [2] unexplained   Commented on: [1] Stool is mucus or pus AND [2] persists > 24 hours     Pt wants to be seen for soft stools and mild abdominal discomfort.   Commented on: [1] Abnormal color is unexplained AND [2] present < 24 hours     Soft stools.    Protocols used: Stools - Unusual Color-A-AH    "

## 2024-03-02 ENCOUNTER — APPOINTMENT (OUTPATIENT)
Dept: CT IMAGING | Facility: CLINIC | Age: 74
End: 2024-03-02
Attending: EMERGENCY MEDICINE
Payer: COMMERCIAL

## 2024-03-02 ENCOUNTER — HOSPITAL ENCOUNTER (EMERGENCY)
Facility: CLINIC | Age: 74
Discharge: HOME | End: 2024-03-02
Attending: EMERGENCY MEDICINE | Admitting: EMERGENCY MEDICINE
Payer: COMMERCIAL

## 2024-03-02 VITALS
WEIGHT: 132 LBS | DIASTOLIC BLOOD PRESSURE: 77 MMHG | TEMPERATURE: 98.1 F | HEIGHT: 66 IN | RESPIRATION RATE: 20 BRPM | SYSTOLIC BLOOD PRESSURE: 146 MMHG | OXYGEN SATURATION: 100 % | BODY MASS INDEX: 21.21 KG/M2 | HEART RATE: 69 BPM

## 2024-03-02 DIAGNOSIS — R10.9 ABDOMINAL PAIN, UNSPECIFIED ABDOMINAL LOCATION: ICD-10-CM

## 2024-03-02 DIAGNOSIS — N20.0 KIDNEY STONE: ICD-10-CM

## 2024-03-02 DIAGNOSIS — K52.9 COLITIS: ICD-10-CM

## 2024-03-02 LAB
ALBUMIN SERPL BCG-MCNC: 4.5 G/DL (ref 3.5–5.2)
ALBUMIN UR-MCNC: NEGATIVE MG/DL
ALP SERPL-CCNC: 85 U/L (ref 40–150)
ALT SERPL W P-5'-P-CCNC: 20 U/L (ref 0–50)
ANION GAP SERPL CALCULATED.3IONS-SCNC: 13 MMOL/L (ref 7–15)
APPEARANCE UR: CLEAR
AST SERPL W P-5'-P-CCNC: 22 U/L (ref 0–45)
BASOPHILS # BLD AUTO: 0 10E3/UL (ref 0–0.2)
BASOPHILS NFR BLD AUTO: 1 %
BILIRUB SERPL-MCNC: 0.4 MG/DL
BILIRUB UR QL STRIP: NEGATIVE
BUN SERPL-MCNC: 10.7 MG/DL (ref 8–23)
CALCIUM SERPL-MCNC: 10 MG/DL (ref 8.8–10.2)
CHLORIDE SERPL-SCNC: 105 MMOL/L (ref 98–107)
COLOR UR AUTO: ABNORMAL
CREAT SERPL-MCNC: 0.69 MG/DL (ref 0.51–0.95)
DEPRECATED HCO3 PLAS-SCNC: 24 MMOL/L (ref 22–29)
EGFRCR SERPLBLD CKD-EPI 2021: >90 ML/MIN/1.73M2
EOSINOPHIL # BLD AUTO: 0.1 10E3/UL (ref 0–0.7)
EOSINOPHIL NFR BLD AUTO: 2 %
ERYTHROCYTE [DISTWIDTH] IN BLOOD BY AUTOMATED COUNT: 12.1 % (ref 10–15)
FLUAV RNA SPEC QL NAA+PROBE: NEGATIVE
FLUBV RNA RESP QL NAA+PROBE: NEGATIVE
GLUCOSE SERPL-MCNC: 102 MG/DL (ref 70–99)
GLUCOSE UR STRIP-MCNC: NEGATIVE MG/DL
HCT VFR BLD AUTO: 37.1 % (ref 35–47)
HGB BLD-MCNC: 12 G/DL (ref 11.7–15.7)
HGB UR QL STRIP: NEGATIVE
IMM GRANULOCYTES # BLD: 0 10E3/UL
IMM GRANULOCYTES NFR BLD: 0 %
KETONES UR STRIP-MCNC: NEGATIVE MG/DL
LEUKOCYTE ESTERASE UR QL STRIP: ABNORMAL
LIPASE SERPL-CCNC: 50 U/L (ref 13–60)
LYMPHOCYTES # BLD AUTO: 1.6 10E3/UL (ref 0.8–5.3)
LYMPHOCYTES NFR BLD AUTO: 39 %
MCH RBC QN AUTO: 28.2 PG (ref 26.5–33)
MCHC RBC AUTO-ENTMCNC: 32.3 G/DL (ref 31.5–36.5)
MCV RBC AUTO: 87 FL (ref 78–100)
MONOCYTES # BLD AUTO: 0.4 10E3/UL (ref 0–1.3)
MONOCYTES NFR BLD AUTO: 9 %
NEUTROPHILS # BLD AUTO: 2 10E3/UL (ref 1.6–8.3)
NEUTROPHILS NFR BLD AUTO: 49 %
NITRATE UR QL: NEGATIVE
NRBC # BLD AUTO: 0 10E3/UL
NRBC BLD AUTO-RTO: 0 /100
PH UR STRIP: 6.5 [PH] (ref 5–7)
PLATELET # BLD AUTO: 232 10E3/UL (ref 150–450)
POTASSIUM SERPL-SCNC: 3.6 MMOL/L (ref 3.4–5.3)
PROT SERPL-MCNC: 7.1 G/DL (ref 6.4–8.3)
RBC # BLD AUTO: 4.26 10E6/UL (ref 3.8–5.2)
RBC URINE: 1 /HPF
RSV RNA SPEC NAA+PROBE: NEGATIVE
SARS-COV-2 RNA RESP QL NAA+PROBE: NEGATIVE
SODIUM SERPL-SCNC: 142 MMOL/L (ref 135–145)
SP GR UR STRIP: 1.01 (ref 1–1.03)
UROBILINOGEN UR STRIP-MCNC: NORMAL MG/DL
WBC # BLD AUTO: 4 10E3/UL (ref 4–11)
WBC URINE: 6 /HPF

## 2024-03-02 PROCEDURE — 258N000003 HC RX IP 258 OP 636: Performed by: EMERGENCY MEDICINE

## 2024-03-02 PROCEDURE — 85025 COMPLETE CBC W/AUTO DIFF WBC: CPT | Performed by: EMERGENCY MEDICINE

## 2024-03-02 PROCEDURE — 36415 COLL VENOUS BLD VENIPUNCTURE: CPT | Performed by: EMERGENCY MEDICINE

## 2024-03-02 PROCEDURE — 96374 THER/PROPH/DIAG INJ IV PUSH: CPT | Mod: 59

## 2024-03-02 PROCEDURE — 83690 ASSAY OF LIPASE: CPT | Performed by: EMERGENCY MEDICINE

## 2024-03-02 PROCEDURE — 250N000011 HC RX IP 250 OP 636: Performed by: EMERGENCY MEDICINE

## 2024-03-02 PROCEDURE — 250N000013 HC RX MED GY IP 250 OP 250 PS 637: Performed by: EMERGENCY MEDICINE

## 2024-03-02 PROCEDURE — 250N000009 HC RX 250: Performed by: EMERGENCY MEDICINE

## 2024-03-02 PROCEDURE — 96361 HYDRATE IV INFUSION ADD-ON: CPT

## 2024-03-02 PROCEDURE — 87086 URINE CULTURE/COLONY COUNT: CPT | Performed by: EMERGENCY MEDICINE

## 2024-03-02 PROCEDURE — 99285 EMERGENCY DEPT VISIT HI MDM: CPT | Mod: 25

## 2024-03-02 PROCEDURE — 80053 COMPREHEN METABOLIC PANEL: CPT | Performed by: EMERGENCY MEDICINE

## 2024-03-02 PROCEDURE — 81001 URINALYSIS AUTO W/SCOPE: CPT | Performed by: EMERGENCY MEDICINE

## 2024-03-02 PROCEDURE — 74177 CT ABD & PELVIS W/CONTRAST: CPT

## 2024-03-02 PROCEDURE — 87637 SARSCOV2&INF A&B&RSV AMP PRB: CPT | Performed by: EMERGENCY MEDICINE

## 2024-03-02 RX ORDER — ONDANSETRON 4 MG/1
4 TABLET, ORALLY DISINTEGRATING ORAL EVERY 8 HOURS PRN
Qty: 10 TABLET | Refills: 0 | Status: SHIPPED | OUTPATIENT
Start: 2024-03-02 | End: 2024-03-05

## 2024-03-02 RX ORDER — IOPAMIDOL 755 MG/ML
66 INJECTION, SOLUTION INTRAVASCULAR ONCE
Status: COMPLETED | OUTPATIENT
Start: 2024-03-02 | End: 2024-03-02

## 2024-03-02 RX ORDER — ONDANSETRON 2 MG/ML
4 INJECTION INTRAMUSCULAR; INTRAVENOUS ONCE
Status: COMPLETED | OUTPATIENT
Start: 2024-03-02 | End: 2024-03-02

## 2024-03-02 RX ORDER — ACETAMINOPHEN 500 MG
1000 TABLET ORAL ONCE
Status: COMPLETED | OUTPATIENT
Start: 2024-03-02 | End: 2024-03-02

## 2024-03-02 RX ORDER — OXYCODONE HYDROCHLORIDE 5 MG/1
5 TABLET ORAL EVERY 6 HOURS PRN
Qty: 6 TABLET | Refills: 0 | Status: SHIPPED | OUTPATIENT
Start: 2024-03-02 | End: 2024-03-05

## 2024-03-02 RX ORDER — HYDROMORPHONE HYDROCHLORIDE 1 MG/ML
0.5 INJECTION, SOLUTION INTRAMUSCULAR; INTRAVENOUS; SUBCUTANEOUS ONCE
Status: DISCONTINUED | OUTPATIENT
Start: 2024-03-02 | End: 2024-03-02 | Stop reason: HOSPADM

## 2024-03-02 RX ADMIN — SODIUM CHLORIDE 60 ML: 9 INJECTION, SOLUTION INTRAVENOUS at 03:02

## 2024-03-02 RX ADMIN — IOPAMIDOL 66 ML: 755 INJECTION, SOLUTION INTRAVENOUS at 03:02

## 2024-03-02 RX ADMIN — ACETAMINOPHEN 1000 MG: 500 TABLET, FILM COATED ORAL at 03:39

## 2024-03-02 RX ADMIN — ONDANSETRON 4 MG: 2 INJECTION INTRAMUSCULAR; INTRAVENOUS at 01:57

## 2024-03-02 RX ADMIN — SODIUM CHLORIDE 1000 ML: 9 INJECTION, SOLUTION INTRAVENOUS at 01:58

## 2024-03-02 ASSESSMENT — COLUMBIA-SUICIDE SEVERITY RATING SCALE - C-SSRS
1. IN THE PAST MONTH, HAVE YOU WISHED YOU WERE DEAD OR WISHED YOU COULD GO TO SLEEP AND NOT WAKE UP?: NO
6. HAVE YOU EVER DONE ANYTHING, STARTED TO DO ANYTHING, OR PREPARED TO DO ANYTHING TO END YOUR LIFE?: NO
2. HAVE YOU ACTUALLY HAD ANY THOUGHTS OF KILLING YOURSELF IN THE PAST MONTH?: NO

## 2024-03-02 ASSESSMENT — ACTIVITIES OF DAILY LIVING (ADL)
ADLS_ACUITY_SCORE: 35

## 2024-03-02 NOTE — ED TRIAGE NOTES
Patient reports diarrhea that started a couple weeks ago. A couple days ago she developed abdominal discomfort and nausea. She has a history of  x2 and laparoscopic surgery for fibroids. She has an appointment scheduled 3/5 but the discomfort became to bad tonight so she decided to come in. Denies urinary symptoms. She has not vomited. She is feeling dizzy which started tonight.      Triage Assessment (Adult)       Row Name 24 0050          Triage Assessment    Airway WDL WDL        Respiratory WDL    Respiratory WDL WDL        Skin Circulation/Temperature WDL    Skin Circulation/Temperature WDL WDL        Cardiac WDL    Cardiac WDL WDL        Peripheral/Neurovascular WDL    Peripheral Neurovascular WDL WDL        Cognitive/Neuro/Behavioral WDL    Cognitive/Neuro/Behavioral WDL WDL

## 2024-03-02 NOTE — ED PROVIDER NOTES
"  History     Chief Complaint:  Abdominal Pain       The history is provided by the patient.      Amaya Huff is a 73 year old female with history of hyperlipidemia, hypertension and coronary artery disease who presents with abdominal pain. Patient reports that she has been having trouble with bowels for the past 6 weeks. She was having very soft stool, almost diarrhea-like, that subsided then had came back. Patient reports having very loose stool size of a \"pencil\".  Patient reports that she has been experiencing pressure in her abdomen with slight intermittent, nonradiating pain. She also mentions experiencing lightheadedness and nausea. Denies vomiting, chest pain, shortness of breath, fever, chills, urinary symptoms, black/bloody stools, weight loss or night sweats.  No sick contacts, recent antibiotics or suspicious food intake. She presents today as she is \"tired of all the symptoms.\"     Independent Historian:   None - Patient Only    Review of External Notes:   23 office visit      Medications:    Aspirin 81mg   Lipitor   Cozaar   Lopressor       Past Medical History:    Arthritis  Brain aneurysm  Coronary artery disease  Heart palpitations  Hyperlipidemia   Mitral valve disorders  Hypertension   Anemia     Past Surgical History:    Abdomen surgery   Cardiac valve surgery   Colonoscopy x2  Coronary angiography   Tonsillectomy   Adenoidectomy     Laser fibroid   Hand surgery   Echocardiogram transesophageal     Physical Exam   Patient Vitals for the past 24 hrs:   BP Temp Temp src Pulse Resp SpO2 Height Weight   24 0245 (!) 146/77 -- -- 69 -- 100 % -- --   24 0047 (!) 207/107 98.1  F (36.7  C) Temporal 80 20 100 % 1.676 m (5' 6\") 59.9 kg (132 lb)        Physical Exam  Nursing note and vitals reviewed.  Constitutional: Well nourished.   Eyes: Conjunctiva normal.  Pupils are equal, round, and reactive to light.   ENT: Nose normal. Mucous membranes pink and moist.    Neck: Normal " range of motion.  CVS: Normal rate, regular rhythm.  Normal heart sounds.    Pulmonary: Lungs clear to auscultation bilaterally. No wheezes/rales/rhonchi.  GI: Abdomen soft. Mild distention, mild generalized tenderness. No rigidity or guarding. No CVA tenderness   MSK: No calf tenderness or swelling.  Neuro: Alert. Follows simple commands.  Skin: Skin is warm and dry. No rash noted.   Psychiatric: Normal affect.       Emergency Department Course       Imaging:  CT Abdomen Pelvis w Contrast   Final Result   IMPRESSION:       1. Mild pericolonic fat stranding adjacent to the hepatic flexure and mid transverse colon compatible with mild colitis.      2. Colonic diverticulosis without acute diverticulitis.      3. 5 mm nonobstructing left renal stone.      4. Increased grade 1 degenerative anterolisthesis of L4 and L5 since 2013.           Laboratory:  Labs Ordered and Resulted from Time of ED Arrival to Time of ED Departure   COMPREHENSIVE METABOLIC PANEL - Abnormal       Result Value    Sodium 142      Potassium 3.6      Carbon Dioxide (CO2) 24      Anion Gap 13      Urea Nitrogen 10.7      Creatinine 0.69      GFR Estimate >90      Calcium 10.0      Chloride 105      Glucose 102 (*)     Alkaline Phosphatase 85      AST 22      ALT 20      Protein Total 7.1      Albumin 4.5      Bilirubin Total 0.4     ROUTINE UA WITH MICROSCOPIC - Abnormal    Color Urine Straw      Appearance Urine Clear      Glucose Urine Negative      Bilirubin Urine Negative      Ketones Urine Negative      Specific Gravity Urine 1.006      Blood Urine Negative      pH Urine 6.5      Protein Albumin Urine Negative      Urobilinogen Urine Normal      Nitrite Urine Negative      Leukocyte Esterase Urine Small (*)     RBC Urine 1      WBC Urine 6 (*)    LIPASE - Normal    Lipase 50     INFLUENZA A/B, RSV, & SARS-COV2 PCR - Normal    Influenza A PCR Negative      Influenza B PCR Negative      RSV PCR Negative      SARS CoV2 PCR Negative     CBC WITH  PLATELETS AND DIFFERENTIAL    WBC Count 4.0      RBC Count 4.26      Hemoglobin 12.0      Hematocrit 37.1      MCV 87      MCH 28.2      MCHC 32.3      RDW 12.1      Platelet Count 232      % Neutrophils 49      % Lymphocytes 39      % Monocytes 9      % Eosinophils 2      % Basophils 1      % Immature Granulocytes 0      NRBCs per 100 WBC 0      Absolute Neutrophils 2.0      Absolute Lymphocytes 1.6      Absolute Monocytes 0.4      Absolute Eosinophils 0.1      Absolute Basophils 0.0      Absolute Immature Granulocytes 0.0      Absolute NRBCs 0.0     URINE CULTURE          Emergency Department Course & Assessments:    Interventions:  Medications   sodium chloride 0.9% BOLUS 1,000 mL (has no administration in time range)   HYDROmorphone (PF) (DILAUDID) injection 0.5 mg (has no administration in time range)   ondansetron (ZOFRAN) injection 4 mg (has no administration in time range)        Assessments:  0116 I obtained history and examined the patient as noted above.       Consultations/Discussion of Management or Tests:  None        Social Determinants of Health affecting care:   None    Disposition:  The patient was discharged.     Impression & Plan      Medical Decision Making:  Patient is a 73-year-old female presenting with predominantly abdominal discomfort as well as loose stools.  She is nontoxic, in no significant distress on arrival.  Given chronicity of symptoms in particular, decision was made to pursue formal CT.  Fortunately no evidence of intra-abdominal catastrophe.  Findings more suggestive of nonspecific colitis.  Incidental nonobstructing left renal stone identified.  Labs overall reassuring without profound anemia or significant electrolyte derangement.  She tested negative for COVID-19/influenza/RSV.  UA without evidence of obvious infection.  Strep suspicion that her discomfort is more secondary to colitis which I suspect is more inflammatory versus infectious or ischemic.  She denies any  accompanying chest pain, bloody stool, fever or other worrisome symptomatology today.  I did recommend supportive care at this point in time with pain control and I will provide a few oxycodone for breakthrough pain as well as ODT Zofran.  Monitor for fever, bloody stool, increasing abdominal pain or should symptoms worsen or change prompted her to present.  I did recommend close PCP follow-up and we did discuss outpatient stool testing may be warranted in the near future as well as consideration of outpatient colonoscopy.  All questions addressed.  Patient comfortable with plan of care.    Diagnosis:      ICD-10-CM    1. Abdominal pain, unspecified abdominal location  R10.9       2. Colitis  K52.9       3. Kidney stone  N20.0     nonobstructing           Discharge Medications:  New Prescriptions    ONDANSETRON (ZOFRAN ODT) 4 MG ODT TAB    Take 1 tablet (4 mg) by mouth every 8 hours as needed for nausea    OXYCODONE (ROXICODONE) 5 MG TABLET    Take 1 tablet (5 mg) by mouth every 6 hours as needed for pain          Scribe Disclosure:  I, Cain Mo, am serving as a scribe at 1:25 AM on 3/2/2024 to document services personally performed by Katie Fleming DO based on my observations and the provider's statements to me.   3/2/2024   Katie Fleming DO McDonald, Lindsey E, DO  03/02/24 0338

## 2024-03-03 LAB — BACTERIA UR CULT: NO GROWTH

## 2024-03-05 ENCOUNTER — OFFICE VISIT (OUTPATIENT)
Dept: FAMILY MEDICINE | Facility: CLINIC | Age: 74
End: 2024-03-05
Payer: COMMERCIAL

## 2024-03-05 VITALS
HEART RATE: 65 BPM | HEIGHT: 66 IN | OXYGEN SATURATION: 99 % | BODY MASS INDEX: 20.89 KG/M2 | DIASTOLIC BLOOD PRESSURE: 69 MMHG | TEMPERATURE: 96.9 F | WEIGHT: 130 LBS | SYSTOLIC BLOOD PRESSURE: 145 MMHG | RESPIRATION RATE: 16 BRPM

## 2024-03-05 DIAGNOSIS — I10 BENIGN ESSENTIAL HYPERTENSION: ICD-10-CM

## 2024-03-05 DIAGNOSIS — R19.4 CHANGE IN BOWEL HABITS: Primary | ICD-10-CM

## 2024-03-05 DIAGNOSIS — Z78.0 POSTMENOPAUSAL STATUS: ICD-10-CM

## 2024-03-05 PROCEDURE — 99214 OFFICE O/P EST MOD 30 MIN: CPT | Performed by: PHYSICIAN ASSISTANT

## 2024-03-05 RX ORDER — VITAMIN K2 90 MCG
1 CAPSULE ORAL DAILY
COMMUNITY

## 2024-03-05 ASSESSMENT — PAIN SCALES - GENERAL: PAINLEVEL: MILD PAIN (2)

## 2024-03-05 NOTE — PROGRESS NOTES
"  Luis Antonio Conde is a 73 year old, presenting for the following health issues:  ER F/U    HPI     Pt has had pencil thin and soft BMs for 6 weeks with assoc abd pain  This is a change in her BMs  Presented in ED on 3/2/24 as very distended and in pain in the night  Currently the pain has improved  No assoc watery diarrhea  Has soft stools several times per day  She takes a probiotic which is not new but switched to generic version 8 weeks ago  She stopped her calcium in Dec    No travel outside of the country  No hx of cdiff  No recent abx use.    No assoc fever or chills  Had some nausea in the ER but that resolved  No assoc weight loss  She quit eating nuts since has tics  She had tried Miralax prior to ER visit as felt like her bowel was \"plugged\"; but hasn't been on that since 2/22/24    CT abd done at that visit revealed:                                                                 IMPRESSION:      1. Mild pericolonic fat stranding adjacent to the hepatic flexure and mid transverse colon compatible with mild colitis.     2. Colonic diverticulosis without acute diverticulitis.     3. 5 mm nonobstructing left renal stone.     4. Increased grade 1 degenerative anterolisthesis of L4 and L5 since 2013.    Last colonoscopy was done in 2017 (+tics, tortuous colon)    ED/UC Followup:    Facility:  [facility]  Date of visit: 03/02/2024  Reason for visit: Abdominal pain, Colitis, Kidney stone  Current Status: thin caliber stools          Past Medical History:   Diagnosis Date    Arthritis 2022    Brain aneurysm     Coronary artery disease     Mild to moderate CAD noted on 3/18/14,rpt 2022 coronary angiogram    Heart palpitations     History of angina     Hyperlipidemia with target LDL less than 130 03/24/2015     Diagnosis updated by automated process. Provider to review and confirm.    Irregular heartbeat     Mitral valve disorders(424.0)     S/P MVR (mitral valve repair)     S/P " "tricuspid valve repair     Unspecified essential hypertension      Past Surgical History:   Procedure Laterality Date    ABDOMEN SURGERY  , ,    2 C-sections 1 laproscope for fibroids    CARDIAC VALVE SURGERY  2022    COLONOSCOPY  2012    Procedure:COLONOSCOPY; COLONOSCOPY; Surgeon:GINGER PARKS; Location: GI    COLONOSCOPY N/A 2017    Procedure: COLONOSCOPY;  colonoscopy;  Surgeon: Dez Siegel MD;  Location:  GI    CORONARY ANGIOGRAPHY ADULT ORDER  2014    3/18/14- Mild to moderate CAD, no interventions, treat medically.    ENT SURGERY  tonsils    GYN SURGERY  c sections    [facility] NONSPECIFIC PROCEDURE      S/P T&A    [facility] NONSPECIFIC PROCEDURE      LASER FIBROIDS    [facility] NONSPECIFIC PROCEDURE       x2     Social History     Tobacco Use    Smoking status: Never    Smokeless tobacco: Never   Substance Use Topics    Alcohol use: Yes     Comment: 1 glass wine/day     Current Outpatient Medications   Medication Sig Dispense Refill    aspirin 81 MG EC tablet Take 81 mg by mouth daily      atorvastatin (LIPITOR) 20 MG tablet Take 1 tablet (20 mg) by mouth daily 90 tablet 3    Cholecalciferol (VITAMIN D3) 1000 units CAPS Take 1 capsule by mouth daily      losartan (COZAAR) 25 MG tablet TAKE 1 TABLET BY MOUTH EVERY DAY 90 tablet 0    metoprolol tartrate (LOPRESSOR) 50 MG tablet Take 1 tablet (50 mg) by mouth 2 times daily 180 tablet 0    Probiotic Product (PROBIOTIC PO)       tretinoin (RETIN-A) 0.025 % external cream        Allergies   Allergen Reactions    Penicillins      \"severe migraines\"     FAMILY HISTORY NOTED AND REVIEWED    PHYSICAL EXAM:    BP (!) 145/69   Pulse 65   Temp 96.9  F (36.1  C) (Temporal)   Resp 16   Ht 1.676 m (5' 6\")   Wt 59 kg (130 lb)   SpO2 99%   BMI 20.98 kg/m      Patient appears non toxic  Abd: thin, soft, NT/ND, no masses or HSM    Assessment and Plan:     (R19.4) Change in bowel habits  (primary encounter " "diagnosis)  Comment: Reviewed ER notes. CT showed \"mod stool throughout the colon\" in addition the \"colitis\".  She is not having diarrhea or watery stools so checking for infection not needed. She did switch her probiotic a few weeks prior to this starting so recd she restart her previous probiotic. Recd she start taking Miralax and a stool softener daily for now. Acute pain resolved; suspect that was gas.  Plan: Adult GI  Referral - Procedure Only. Colonoscopy ordered.            (Z78.0) Postmenopausal status  Comment:   Plan: DX Hip/Pelvis/Spine            (I10) Benign essential hypertension  Comment:   Plan: pt's home BP this morning was 113/65. Runs high in doctor offices      Gail Palma PA-C      "

## 2024-03-05 NOTE — NURSING NOTE
"BP:  BP (!) 145/69   Pulse 65   Temp 96.9  F (36.1  C) (Temporal)   Resp 16   Ht 1.676 m (5' 6\")   Wt 59 kg (130 lb)   SpO2 99%   BMI 20.98 kg/m       65  Disposition: provider notified while patient in the clinic    "

## 2024-03-07 ENCOUNTER — HOSPITAL ENCOUNTER (OUTPATIENT)
Facility: CLINIC | Age: 74
End: 2024-03-07
Attending: COLON & RECTAL SURGERY | Admitting: COLON & RECTAL SURGERY
Payer: COMMERCIAL

## 2024-03-07 ENCOUNTER — TELEPHONE (OUTPATIENT)
Dept: GASTROENTEROLOGY | Facility: CLINIC | Age: 74
End: 2024-03-07
Payer: COMMERCIAL

## 2024-03-07 NOTE — TELEPHONE ENCOUNTER
"Endoscopy Scheduling Screen    Have you had a positive Covid test in the last 14 days?  No    Are you active on MyChart?   Yes    What insurance is in the chart?  Other:  BCBS, Medicare    Ordering/Referring Provider:  [facility]   (If ordering provider performs procedure, schedule with ordering provider unless otherwise instructed. )    BMI: Estimated body mass index is 20.98 kg/m  as calculated from the following:    Height as of 3/5/24: 1.676 m (5' 6\").    Weight as of 3/5/24: 59 kg (130 lb).     Sedation Ordered  moderate sedation.   If patient BMI > 50 do not schedule in [facility].    If patient BMI > 45 do not schedule at [facility].    Are you taking methadone or Suboxone?  No    Have you had difficulties, pain, or discomfort during past endoscopy procedures?  No    Are you taking any prescription medications for pain 3 or more times per week?   NO - No RN review required.    Do you have a history of malignant hyperthermia or adverse reaction to anesthesia?  No    (Females) Are you currently pregnant?   No     Have you been diagnosed or told you have pulmonary hypertension?   No    Do you have an LVAD?  No    Have you been told you have moderate to severe sleep apnea?  No    Have you been told you have COPD, asthma, or any other lung disease?  No    Do you have any heart conditions?  Yes - mitro valve and tricuspid valve surgery  08/2022    In the past year, have you had any hospitalizations for heart related issues including cardiomyopathy, heart attack, or stent placement?  No    Do you have any implantable devices in your body (pacemaker, ICD)?  No    Do you take nitroglycerine?  No    Have you ever had an organ transplant?   No    Have you ever had or are you awaiting a heart or lung transplant?   No    Have you had a stroke or transient ischemic attack (TIA aka \"mini stroke\" in the last 6 months?   No    Have you been diagnosed with or been told you have cirrhosis of the liver?   No    Are you currently on " "dialysis?   No    Do you need assistance transferring?   No    BMI: Estimated body mass index is 20.98 kg/m  as calculated from the following:    Height as of 3/5/24: 1.676 m (5' 6\").    Weight as of 3/5/24: 59 kg (130 lb).     Is patients BMI > 40 and scheduling location [facility]?  No    Do you take an injectable medication for weight loss or diabetes (excluding insulin)?  No    Do you take the medication Naltrexone?  No    Do you take blood thinners?  No       Prep   Are you currently on dialysis or do you have chronic kidney disease?  No    Do you have a diagnosis of diabetes?  No    Do you have a diagnosis of cystic fibrosis (CF)?  No    On a regular basis do you go 3 -5 days between bowel movements?  Yes (Extended Prep)    BMI > 40?  No    Preferred Pharmacy:    [facility] [place] [facility] [address]  [address] [facility] [address]  Phone: [phone] Fax: [phone]      Final Scheduling Details   Colonoscopy prep sent?  N/A    Procedure scheduled  Colonoscopy    Surgeon:  Donna     Date of procedure:  06/03/2024     Pre-OP / PAC:   No - Not required for this site.    Location  [facility] - Patient preference.    Sedation   Moderate Sedation - Per order.      Patient Reminders:   You will receive a call from a Nurse to review instructions and health history.  This assessment must be completed prior to your procedure.  Failure to complete the Nurse assessment may result in the procedure being cancelled.      On the day of your procedure, please designate an adult(s) who can drive you home stay with you for the next 24 hours. The medicines used in the exam will make you sleepy. You will not be able to drive.      You cannot take public transportation, ride share services, or non-medical taxi service without a responsible caregiver.  Medical transport services are allowed with the requirement that a responsible caregiver will receive you at your destination.  We require that drivers and caregivers are confirmed " prior to your procedure.

## 2024-03-08 ENCOUNTER — TELEPHONE (OUTPATIENT)
Dept: FAMILY MEDICINE | Facility: CLINIC | Age: 74
End: 2024-03-08
Payer: COMMERCIAL

## 2024-03-08 DIAGNOSIS — R19.4 CHANGE IN BOWEL HABITS: Primary | ICD-10-CM

## 2024-03-08 NOTE — TELEPHONE ENCOUNTER
She could try [facility]. Please give her their number as they have multiple locations and can likely get her in more quickly.  I will change the referral.

## 2024-03-08 NOTE — TELEPHONE ENCOUNTER
FYI- No further action is needed unless other recommendations from PCP.     Pt had appt with CARLA Reynolds 03/05/2024. PA ordered a coloscopy. Pt has scheduled colonoscopy but 06/03 is  he earliest appt she could get. Pt asked if PCP can get her seen sooner. Triage advised pt to call her insurance and find other in network GI clinics, and call clinic back if a new GI referred is needed.     Pt states she will call [facility].

## 2024-03-08 NOTE — TELEPHONE ENCOUNTER
Order/Referral Request    Who is requesting: patient     Orders being requested: colonoscopy     Reason service is needed/diagnosis: First available with Cathy is in June and Amaya would like a colonoscopy before June due to problems     When are orders needed by: as soon as possible     Has this been discussed with Provider: Yes    Does patient have a preference on a Group/Provider/Facility? [facility]     Does patient have an appointment scheduled?: No    Where to send orders:  [facility] in [place]     Could we send this information to you in [facility] or would you prefer to receive a phone call?:   Patient would prefer a phone call   Okay to leave a detailed message?: Yes at Home number on file [phone] (home)

## 2024-03-08 NOTE — TELEPHONE ENCOUNTER
Called patient regarding PA-C message below. She verbalized understanding and already had the number to [facility].

## 2024-03-08 NOTE — TELEPHONE ENCOUNTER
Gail, you have seen her for   I do not think this is urgent   Please review and give your input  6/2024 should be ok   Dr.Nasima Davie MD

## 2024-03-11 ENCOUNTER — TELEPHONE (OUTPATIENT)
Dept: FAMILY MEDICINE | Facility: CLINIC | Age: 74
End: 2024-03-11
Payer: COMMERCIAL

## 2024-03-11 NOTE — TELEPHONE ENCOUNTER
Patient requesting that referral be faxed to [facility]. Referral given to Cynthia HERNANDEZ to fax . Aline Cruz RN

## 2024-03-22 DIAGNOSIS — I25.10 MILD CAD: ICD-10-CM

## 2024-03-22 DIAGNOSIS — I10 PRIMARY HYPERTENSION: ICD-10-CM

## 2024-03-22 RX ORDER — METOPROLOL TARTRATE 50 MG
50 TABLET ORAL 2 TIMES DAILY
Qty: 180 TABLET | Refills: 0 | Status: SHIPPED | OUTPATIENT
Start: 2024-03-22 | End: 2024-07-01

## 2024-04-02 ENCOUNTER — TRANSFERRED RECORDS (OUTPATIENT)
Dept: HEALTH INFORMATION MANAGEMENT | Facility: CLINIC | Age: 74
End: 2024-04-02
Payer: COMMERCIAL

## 2024-04-08 ENCOUNTER — PATIENT OUTREACH (OUTPATIENT)
Dept: CARE COORDINATION | Facility: CLINIC | Age: 74
End: 2024-04-08
Payer: COMMERCIAL

## 2024-04-08 ENCOUNTER — HOSPITAL ENCOUNTER (OUTPATIENT)
Dept: BONE DENSITY | Facility: CLINIC | Age: 74
Discharge: HOME | End: 2024-04-08
Attending: PHYSICIAN ASSISTANT | Admitting: PHYSICIAN ASSISTANT
Payer: COMMERCIAL

## 2024-04-08 DIAGNOSIS — Z78.0 POSTMENOPAUSAL STATUS: ICD-10-CM

## 2024-04-08 PROCEDURE — 77080 DXA BONE DENSITY AXIAL: CPT

## 2024-04-09 NOTE — RESULT ENCOUNTER NOTE
Amaya,    Your bone density shows that your scores place you in the osteoporosis range which means you are at increase risk for fracture. There are many treatment options available.  Please make a virtual appointment with Dr. Palm to discuss these options which include prescription medications like fosamax.    Gail Palma PA-C

## 2024-04-19 DIAGNOSIS — I10 PRIMARY HYPERTENSION: ICD-10-CM

## 2024-04-19 RX ORDER — LOSARTAN POTASSIUM 25 MG/1
25 TABLET ORAL DAILY
Qty: 90 TABLET | Refills: 0 | Status: SHIPPED | OUTPATIENT
Start: 2024-04-19 | End: 2024-07-12

## 2024-05-06 ENCOUNTER — PATIENT OUTREACH (OUTPATIENT)
Dept: CARE COORDINATION | Facility: CLINIC | Age: 74
End: 2024-05-06
Payer: COMMERCIAL

## 2024-06-17 ENCOUNTER — OFFICE VISIT (OUTPATIENT)
Dept: URGENT CARE | Facility: URGENT CARE | Age: 74
End: 2024-06-17
Payer: COMMERCIAL

## 2024-06-17 VITALS
SYSTOLIC BLOOD PRESSURE: 144 MMHG | HEART RATE: 81 BPM | OXYGEN SATURATION: 98 % | DIASTOLIC BLOOD PRESSURE: 90 MMHG | RESPIRATION RATE: 16 BRPM | TEMPERATURE: 97.4 F

## 2024-06-17 DIAGNOSIS — N39.0 ACUTE UTI (URINARY TRACT INFECTION): ICD-10-CM

## 2024-06-17 DIAGNOSIS — R30.0 DYSURIA: Primary | ICD-10-CM

## 2024-06-17 LAB
ALBUMIN UR-MCNC: 30 MG/DL
APPEARANCE UR: CLEAR
BACTERIA #/AREA URNS HPF: ABNORMAL /HPF
BILIRUB UR QL STRIP: NEGATIVE
COLOR UR AUTO: ABNORMAL
GLUCOSE UR STRIP-MCNC: NEGATIVE MG/DL
HGB UR QL STRIP: ABNORMAL
KETONES UR STRIP-MCNC: NEGATIVE MG/DL
LEUKOCYTE ESTERASE UR QL STRIP: ABNORMAL
NITRATE UR QL: NEGATIVE
PH UR STRIP: 7 [PH] (ref 5–7)
RBC #/AREA URNS AUTO: ABNORMAL /HPF
SP GR UR STRIP: 1.01 (ref 1–1.03)
SQUAMOUS #/AREA URNS AUTO: ABNORMAL /LPF
UROBILINOGEN UR STRIP-ACNC: 0.2 E.U./DL
WBC #/AREA URNS AUTO: ABNORMAL /HPF

## 2024-06-17 PROCEDURE — 87086 URINE CULTURE/COLONY COUNT: CPT | Performed by: STUDENT IN AN ORGANIZED HEALTH CARE EDUCATION/TRAINING PROGRAM

## 2024-06-17 PROCEDURE — 81001 URINALYSIS AUTO W/SCOPE: CPT | Performed by: STUDENT IN AN ORGANIZED HEALTH CARE EDUCATION/TRAINING PROGRAM

## 2024-06-17 PROCEDURE — 99213 OFFICE O/P EST LOW 20 MIN: CPT | Performed by: STUDENT IN AN ORGANIZED HEALTH CARE EDUCATION/TRAINING PROGRAM

## 2024-06-17 RX ORDER — NITROFURANTOIN 25; 75 MG/1; MG/1
100 CAPSULE ORAL 2 TIMES DAILY
Qty: 10 CAPSULE | Refills: 0 | Status: SHIPPED | OUTPATIENT
Start: 2024-06-17 | End: 2024-06-22

## 2024-06-17 RX ORDER — PHENAZOPYRIDINE HYDROCHLORIDE 100 MG/1
100 TABLET, FILM COATED ORAL 3 TIMES DAILY PRN
Qty: 6 TABLET | Refills: 0 | Status: SHIPPED | OUTPATIENT
Start: 2024-06-17 | End: 2024-06-23

## 2024-06-17 NOTE — PROGRESS NOTES
chief complaint: Dysuria     HPI:  Amaya Huff is a 73 year old female who presents today complaining of possible UTI. Woke up today with increased urine frequency, burning with urination and also noticed some blood after wiping today. No fever, chills, N/V, flank pain or any other symptoms. Has had UTI before about 8 yrs ago and symptoms feels similar. No other concerns.    History obtained from the patient.    Problem List:  2022-11: Paroxysmal atrial fibrillation (H)  2022-05: Dizziness  2022-05: Facial paresthesia  2022-05: Chronic intractable headache, unspecified headache type  2022-05: Chest pain, unspecified type  2021-12: Acute cystitis without hematuria  2021-12: Urinary problem  2021-12: Kidney stone  2021-10: Family history of malignant neoplasm of breast  2021-10: Disorder of bone and cartilage  2017-05: SBO (small bowel obstruction) (H)  2016-06: Cervical pain  2016-05: Mild CAD  2015-03: Hyperlipidemia with target LDL less than 130  2015-01: Advanced directives, counseling/discussion  2014-05: Premature beats  2014-05: Postsurgical percutaneous transluminal coronary angioplasty   status (PTCA)  2014-05: Mitral valve disorder  2014-05: Chest pain  2013-06: Pain in joint, pelvic region and thigh  2012-02: Abdominal pain  2011-04: Neck pain  2003-08: Undiagnosed cardiac murmurs  2003-08: Other specified cardiac dysrhythmias(427.89)  Heart palpitations  Coronary artery disease      Past Medical History:   Diagnosis Date    Arthritis 2022    Brain aneurysm     Coronary artery disease     Mild to moderate CAD noted on 3/18/14,rpt 2022 coronary angiogram    Heart palpitations     History of angina     Hyperlipidemia with target LDL less than 130 03/24/2015     Diagnosis updated by automated process. Provider to review and confirm.    Irregular heartbeat     Mitral valve disorders(424.0)     S/P MVR (mitral valve repair)     S/P tricuspid valve repair     Unspecified essential hypertension 1995        Social History     Tobacco Use    Smoking status: Never    Smokeless tobacco: Never   Substance Use Topics    Alcohol use: Yes     Comment: 1 glass wine/day       Review of systems  ROS negative except for pertinent positives listed in HPI      Vitals:    06/17/24 1711   BP: (!) 144/90   Pulse: 81   Resp: 16   Temp: 97.4  F (36.3  C)   TempSrc: Tympanic   SpO2: 98%       Physical Exam  Constitutional: healthy, alert, and no distress  Head: Normocephalic.   Respiratory:unlabored respiratory effort  Gastrointestinal: No CVA tenderness. Abdomen soft, non-tender. BS normal. No masses, organomegaly  Musculoskeletal: extremities normal- no gross deformities noted, gait normal  Psychiatric: mentation appears normal and affect normal/bright      Assessment & Plan     Amaya was seen today for uti.    Diagnoses and all orders for this visit:      Acute UTI (urinary tract infection)  Dysuria  Ua revealed blood, WBC and leuk est in urine, likely has UTI. Discussed tx with Macrobid, also discussed signs of systemic and kidney infection to watch out for and when to seek urgent medical care. Patient agreed to plan. Aware that she might need additional rx if bacteria is not covered by Macrobid when cultures result.  -     UA Macroscopic with reflex to Microscopic and Culture - Clinic Collect  -     UA Microscopic with Reflex to Culture  -     Urine Culture  -     nitroFURantoin macrocrystal-monohydrate (MACROBID) 100 MG capsule; Take 1 capsule (100 mg) by mouth 2 times daily for 5 days  -     phenazopyridine (PYRIDIUM) 100 MG tablet; Take 1 tablet (100 mg) by mouth 3 times daily as needed for urinary tract discomfort    At the end of the encounter, I discussed results, diagnosis, medications. Discussed red flags for immediate return to clinic/ER, as well as indications for follow up if no improvement. Patient understood and agreed to plan. Patient was stable for discharge.

## 2024-06-17 NOTE — PATIENT INSTRUCTIONS
Urinary Tract Infection (UTI) in Women: Care Instructions  Overview     A urinary tract infection (UTI) is an infection caused by bacteria. It can happen anywhere in the urinary tract. A UTI can happen in the:  Kidneys.  Ureters, the tubes that connect the kidneys to the bladder.  Bladder.  Urethra, where the urine comes out.  Most UTIs are bladder infections. They often cause pain or burning when you urinate.  Most UTIs can be cured with antibiotics. If you are prescribed antibiotics, be sure to complete your treatment so that the infection does not get worse.  Follow-up care is a key part of your treatment and safety. Be sure to make and go to all appointments, and call your doctor if you are having problems. It's also a good idea to know your test results and keep a list of the medicines you take.  How can you care for yourself at home?  Take your antibiotics as directed. Do not stop taking them just because you feel better. You need to take the full course of antibiotics.  Drink extra water and other fluids for the next day or two. This will help make the urine less concentrated and help wash out the bacteria that are causing the infection. (If you have kidney, heart, or liver disease and have to limit fluids, talk with your doctor before you increase the amount of fluids you drink.)  Avoid drinks that are carbonated or have caffeine. They can irritate the bladder.  Urinate often. Try to empty your bladder each time.  To relieve pain, take a hot bath or lay a heating pad set on low over your lower belly or genital area. Never go to sleep with a heating pad in place.  To prevent UTIs  Drink plenty of water each day. This helps you urinate often, which clears bacteria from your system. (If you have kidney, heart, or liver disease and have to limit fluids, talk with your doctor before you increase the amount of fluids you drink.)  Urinate when you need to.  If you are sexually active, urinate right after you have  "sex.  Change sanitary pads often.  Avoid douches, bubble baths, feminine hygiene sprays, and other feminine hygiene products that have deodorants.  After going to the bathroom, wipe from front to back.  When should you call for help?   Call your doctor now or seek immediate medical care if:    You have new or worse fever, chills, nausea, or vomiting.     You have new pain in your back just below your rib cage. This is called flank pain.     There is new blood or pus in your urine.     You have any problems with your antibiotic medicine.   Watch closely for changes in your health, and be sure to contact your doctor if:    You are not getting better after taking an antibiotic for 2 days.     Your symptoms go away but then come back.   Where can you learn more?  Go to https://www.[employer].net/patiented  Enter K848 in the search box to learn more about \"Urinary Tract Infection (UTI) in Women: Care Instructions.\"  Current as of: November 15, 2023               Content Version: 14.0    4919-7687 [employer].   Care instructions adapted under license by your healthcare professional. If you have questions about a medical condition or this instruction, always ask your healthcare professional. [employer] disclaims any warranty or liability for your use of this information.      "

## 2024-06-19 LAB — BACTERIA UR CULT: NO GROWTH

## 2024-06-26 ENCOUNTER — TRANSFERRED RECORDS (OUTPATIENT)
Dept: HEALTH INFORMATION MANAGEMENT | Facility: CLINIC | Age: 74
End: 2024-06-26
Payer: COMMERCIAL

## 2024-06-30 DIAGNOSIS — I25.10 MILD CAD: ICD-10-CM

## 2024-06-30 DIAGNOSIS — I10 PRIMARY HYPERTENSION: ICD-10-CM

## 2024-07-01 RX ORDER — METOPROLOL TARTRATE 50 MG
50 TABLET ORAL 2 TIMES DAILY
Qty: 180 TABLET | Refills: 0 | Status: SHIPPED | OUTPATIENT
Start: 2024-07-01 | End: 2024-09-27

## 2024-07-12 DIAGNOSIS — I10 PRIMARY HYPERTENSION: ICD-10-CM

## 2024-07-12 RX ORDER — LOSARTAN POTASSIUM 25 MG/1
25 TABLET ORAL DAILY
Qty: 90 TABLET | Refills: 3 | Status: SHIPPED | OUTPATIENT
Start: 2024-07-12

## 2024-08-26 DIAGNOSIS — E78.5 HYPERLIPIDEMIA LDL GOAL <70: ICD-10-CM

## 2024-08-26 RX ORDER — ATORVASTATIN CALCIUM 20 MG/1
20 TABLET, FILM COATED ORAL DAILY
Qty: 90 TABLET | Refills: 0 | Status: SHIPPED | OUTPATIENT
Start: 2024-08-26

## 2024-09-27 DIAGNOSIS — I10 PRIMARY HYPERTENSION: ICD-10-CM

## 2024-09-27 DIAGNOSIS — I25.10 MILD CAD: ICD-10-CM

## 2024-09-27 RX ORDER — METOPROLOL TARTRATE 50 MG
50 TABLET ORAL 2 TIMES DAILY
Qty: 180 TABLET | Refills: 3 | Status: SHIPPED | OUTPATIENT
Start: 2024-09-27

## 2024-12-09 ENCOUNTER — TRANSFERRED RECORDS (OUTPATIENT)
Dept: MULTI SPECIALTY CLINIC | Facility: CLINIC | Age: 74
End: 2024-12-09
Payer: COMMERCIAL

## 2024-12-09 LAB
CHOLESTEROL (EXTERNAL): 159 MG/DL
HDLC SERPL-MCNC: 79 MG/DL
LDL CHOLESTEROL CALCULATED (EXTERNAL): 66 MG/DL
NON HDL CHOLESTEROL (EXTERNAL): 80 MG/DL
TRIGLYCERIDES (EXTERNAL): 72 MG/DL

## 2024-12-16 SDOH — HEALTH STABILITY: PHYSICAL HEALTH: ON AVERAGE, HOW MANY MINUTES DO YOU ENGAGE IN EXERCISE AT THIS LEVEL?: 50 MIN

## 2024-12-16 SDOH — HEALTH STABILITY: PHYSICAL HEALTH: ON AVERAGE, HOW MANY DAYS PER WEEK DO YOU ENGAGE IN MODERATE TO STRENUOUS EXERCISE (LIKE A BRISK WALK)?: 5 DAYS

## 2024-12-16 ASSESSMENT — SOCIAL DETERMINANTS OF HEALTH (SDOH): HOW OFTEN DO YOU GET TOGETHER WITH FRIENDS OR RELATIVES?: THREE TIMES A WEEK

## 2024-12-17 ENCOUNTER — OFFICE VISIT (OUTPATIENT)
Dept: FAMILY MEDICINE | Facility: CLINIC | Age: 74
End: 2024-12-17
Payer: COMMERCIAL

## 2024-12-17 VITALS
RESPIRATION RATE: 16 BRPM | HEART RATE: 66 BPM | SYSTOLIC BLOOD PRESSURE: 117 MMHG | BODY MASS INDEX: 21.69 KG/M2 | OXYGEN SATURATION: 99 % | DIASTOLIC BLOOD PRESSURE: 62 MMHG | WEIGHT: 135 LBS | TEMPERATURE: 98.2 F | HEIGHT: 66 IN

## 2024-12-17 DIAGNOSIS — N20.0 KIDNEY STONE: ICD-10-CM

## 2024-12-17 DIAGNOSIS — I25.10 MILD CAD: ICD-10-CM

## 2024-12-17 DIAGNOSIS — I10 PRIMARY HYPERTENSION: ICD-10-CM

## 2024-12-17 DIAGNOSIS — L98.9 FACIAL SKIN LESION: ICD-10-CM

## 2024-12-17 DIAGNOSIS — Z98.890 S/P MITRAL VALVE REPAIR: ICD-10-CM

## 2024-12-17 DIAGNOSIS — L65.9 HAIR LOSS: ICD-10-CM

## 2024-12-17 DIAGNOSIS — Z98.890 S/P COIL EMBOLIZATION OF CEREBRAL ANEURYSM: ICD-10-CM

## 2024-12-17 DIAGNOSIS — Z23 NEED FOR TDAP VACCINATION: ICD-10-CM

## 2024-12-17 DIAGNOSIS — I48.0 PAROXYSMAL ATRIAL FIBRILLATION (H): ICD-10-CM

## 2024-12-17 DIAGNOSIS — Z00.00 ENCOUNTER FOR ANNUAL WELLNESS VISIT (AWV) IN MEDICARE PATIENT: Primary | ICD-10-CM

## 2024-12-17 DIAGNOSIS — R59.1 LYMPHADENOPATHY: ICD-10-CM

## 2024-12-17 DIAGNOSIS — M81.0 AGE-RELATED OSTEOPOROSIS WITHOUT CURRENT PATHOLOGICAL FRACTURE: ICD-10-CM

## 2024-12-17 PROCEDURE — G0439 PPPS, SUBSEQ VISIT: HCPCS | Performed by: INTERNAL MEDICINE

## 2024-12-17 PROCEDURE — 99214 OFFICE O/P EST MOD 30 MIN: CPT | Mod: 25 | Performed by: INTERNAL MEDICINE

## 2024-12-17 RX ORDER — PYRITHIONE ZINC 1 G/ML
LOTION/SHAMPOO TOPICAL
COMMUNITY

## 2024-12-17 RX ORDER — ALENDRONATE SODIUM 70 MG/1
70 TABLET ORAL
Qty: 12 TABLET | Refills: 3 | Status: SHIPPED | OUTPATIENT
Start: 2024-12-17

## 2024-12-17 ASSESSMENT — PAIN SCALES - GENERAL: PAINLEVEL_OUTOF10: NO PAIN (0)

## 2024-12-17 NOTE — PATIENT INSTRUCTIONS
Calcium is present in   Cheese  2. Yogurt  3. Milk  4. Sardines  5. Dark leafy greens like spinach, kale, turnips, and leydi greens  6. Fortified cereals such as Total, Raisin Bran, Corn Flakes (They have a lot of calcium in one serving.)  7. Fortified orange juice  8. Soybeans  9. Fortified soymilk (Not all soymilk is a good source of calcium, so it's best to check the label.)  10. Enriched breads, grains, and waffles

## 2024-12-17 NOTE — PROGRESS NOTES
Preventive Care Visit  [facility] CASSY Palm MD, Internal Medicine  Dec 17, 2024      Assessment & Plan     Encounter for annual wellness visit (AWV) in Medicare patient  This is a very pleasant 74 years old woman who is doing very well  Her exercise and diet is good and there are some questions other than preventive exam today    - REVIEW OF HEALTH MAINTENANCE PROTOCOL ORDERS    Primary hypertension  Patient is on metoprolol and losartan and blood pressure was excellent    S/P mitral valve repair and tricuspid  Echocardiogram reviewed  This was in 2021 currently your sternotomy scar is healing well  - REVIEW OF HEALTH MAINTENANCE PROTOCOL ORDERS  For medical condition is stable and mild throat and tricuspid valves are functioning very well and she has not had atrial fibrillation  S/P coil embolization of cerebral aneurysm  Patient has had this picture of the type of aneurysm coiled that she has  This was intracranial and imaging is being done through [facility] every other year for her  - REVIEW OF HEALTH MAINTENANCE PROTOCOL ORDERS    Paroxysmal atrial fibrillation (H)  This was perioperative  - REVIEW OF HEALTH MAINTENANCE PROTOCOL ORDERS    Mild CAD  This is minimal disease and she is on statins and baby aspirin  - REVIEW OF HEALTH MAINTENANCE PROTOCOL ORDERS    Hair loss  Patient is on Propecia    Need for Tdap vaccination    - Tdap, tetanus-diptheria-acell pertussis, (BOOSTRIX) 5-2.5-18.5 LF-MCG/0.5 NEENA injection  Dispense: 0.5 mL; Refill: 0    Kidney stone  Kidney stone has persisted on the repeat CT scan but is small and she hydrates  - REVIEW OF HEALTH MAINTENANCE PROTOCOL ORDERS    Age-related osteoporosis without current pathological fracture  We discussed about the bone density scan and calcium supplements are not recommended and we discussed about calcium in the diet and vitamin Vitamin D  We discussed about bisphosphonates.  You need to take that with 8 ounces of water and empty  stomach once a week and remain upright for 30 minutes.  She will let her dentist know    - REVIEW OF HEALTH MAINTENANCE PROTOCOL ORDERS  - alendronate (FOSAMAX) 70 MG tablet  Dispense: 12 tablet; Refill: 3    Facial skin lesion  This is a new lesion and dermatology should look at it  - Adult Dermatology  Referral    Lymphadenopathy  We looked at the repeat ultrasound in  and this was tiny and insignificant and we will observe              Counseling  Appropriate preventive services were addressed with this patient via screening, questionnaire, or discussion as appropriate for fall prevention, nutrition, physical activity, , social engagement, weight loss and cognition.  Checklist reviewing preventive services available has been given to the patient.  Reviewed patient's diet, addressing concerns and/or questions.     She then had treatment for an interest cerebral aneurysm and seems to recovered from all of the above.       Luis Antonio Conde is a 74 year old, presenting for the followin2022   The patient underwent urgent mitral valve repair and tricuspid valve repair in 2022  She is doing very well  Physical        2024     8:48 AM   Additional Questions   Roomed by Brianna GUNDERSON        -Lumbar Spine: L1-L4: BMD: 1.040 g/cm2. T-score: -1.2. Z-score: 0.6. Degenerative change may artifactually increase BMD.  -RIGHT Hip Total: BMD: 0.729 g/cm2. T-score: -2.2. Z-score: -0.5.  -RIGHT Hip Femoral neck: BMD: 0.692 g/cm2. T-score: -2.5. Z-score: -0.6.  -LEFT Hip Total: BMD: 0.756 g/cm2. T-score: -2.0. Z-score: -0.3.  -LEFT Hip Femoral neck: BMD: 0.679 g/cm2. T-score: -2.6. Z-score: -0.7.       Health Care Directive  Patient does not have a Health Care Directive: Patient states has Advance Directive and will bring in a copy to clinic.      2024   General Health   How would you rate your overall physical health? Excellent   Feel stress (tense, anxious, or unable to sleep) Not at all             12/16/2024   Nutrition   Diet: Regular (no restrictions)            12/16/2024   Exercise   Days per week of moderate/strenous exercise 5 days   Average minutes spent exercising at this level 50 min            12/16/2024   Social Factors   Frequency of gathering with friends or relatives Three times a week   Worry food won't last until get money to buy more No   Food not last or not have enough money for food? No   Do you have housing? (Housing is defined as stable permanent housing and does not include staying ouside in a car, in a tent, in an abandoned building, in an overnight shelter, or couch-surfing.) Yes   Are you worried about losing your housing? No   Lack of transportation? No   Unable to get utilities (heat,electricity)? No            12/16/2024   Fall Risk   Fallen 2 or more times in the past year? No     No    Trouble with walking or balance? No     No        Patient-reported    Multiple values from one day are sorted in reverse-chronological order          12/16/2024   Activities of Daily Living- Home Safety   Needs help with the following daily activites None of the above   Safety concerns in the home None of the above            12/16/2024   Dental   Dentist two times every year? Yes            12/16/2024   Hearing Screening   Hearing concerns? None of the above            12/16/2024   Driving Risk Screening   Patient/family members have concerns about driving No            12/16/2024   General Alertness/Fatigue Screening   Have you been more tired than usual lately? No            12/16/2024   Urinary Incontinence Screening   Bothered by leaking urine in past 6 months No            12/16/2024   TB Screening   Were you born outside of the [place]? No            Today's PHQ-2 Score:       12/16/2024     6:06 PM   PHQ-2 ( 1999 [employer])   Q1: Little interest or pleasure in doing things 0    Q2: Feeling down, depressed or hopeless 0    PHQ-2 Score 0    Q1: Little interest or pleasure in doing things  Not at all   Q2: Feeling down, depressed or hopeless Not at all   PHQ-2 Score 0       Patient-reported           12/16/2024   Substance Use   Alcohol more than 3/day or more than 7/wk No   Do you have a current opioid prescription? No   How severe/bad is pain from 1 to 10? 0/10 (No Pain)   Do you use any other substances recreationally? No        Social History     Tobacco Use    Smoking status: Never    Smokeless tobacco: Never   Vaping Use    Vaping status: Never Used   Substance Use Topics    Alcohol use: Yes     Comment: 1 glass wine/day    Drug use: No           11/16/2022   LAST FHS-7 RESULTS   1st degree relative breast or ovarian cancer Yes   Any relative bilateral breast cancer No   Any male have breast cancer No   Any ONE woman have BOTH breast AND ovarian cancer Yes   Any woman with breast cancer before 50yrs No   2 or more relatives with breast AND/OR ovarian cancer Yes   2 or more relatives with breast AND/OR bowel cancer Yes           Mammogram Screening - Mammogram every 1-2 years updated in Health Maintenance based on mutual decision making    ASCVD Risk   The 10-year ASCVD risk score (Saranya MUNOZ, et al., 2019) is: 13.8%    Values used to calculate the score:      Age: 74 years      Sex: Female      Is Non- : No      Diabetic: No      Tobacco smoker: No      Systolic Blood Pressure: 117 mmHg      Is BP treated: Yes      HDL Cholesterol: 80 mg/dL      Total Cholesterol: 170 mg/dL            Reviewed and updated as needed this visit by Provider     Meds  Problems    Fam Hx            BP Readings from Last 3 Encounters:   12/17/24 117/62   06/17/24 (!) 144/90   03/05/24 (!) 145/69    Wt Readings from Last 3 Encounters:   12/17/24 61.2 kg (135 lb)   03/05/24 59 kg (130 lb)   03/02/24 59.9 kg (132 lb)                  Patient Active Problem List   Diagnosis    Premature beats    Mitral valve disorder    Mild CAD    SBO (small bowel obstruction) (H)    Family history of  malignant neoplasm of breast    Disorder of bone and cartilage    Acute cystitis without hematuria    Urinary problem    Kidney stone    Dizziness    Paroxysmal atrial fibrillation (H)    S/P coil embolization of cerebral aneurysm     Past Surgical History:   Procedure Laterality Date    ABDOMEN SURGERY  , ,    2 C-sections 1 laproscope for fibroids    CARDIAC VALVE SURGERY  2022    COLONOSCOPY  2012    Procedure:COLONOSCOPY; COLONOSCOPY; Surgeon:GINGER PARKS; Location: GI    COLONOSCOPY N/A 2017    Procedure: COLONOSCOPY;  colonoscopy;  Surgeon: Dez Siegel MD;  Location:  GI    CORONARY ANGIOGRAPHY ADULT ORDER  2014    3/18/14- Mild to moderate CAD, no interventions, treat medically.    ENT SURGERY  tonsils    GYN SURGERY  c sections    ZZC NONSPECIFIC PROCEDURE      S/P T&A    Z NONSPECIFIC PROCEDURE      LASER FIBROIDS    Z NONSPECIFIC PROCEDURE       x2       Social History     Tobacco Use    Smoking status: Never    Smokeless tobacco: Never   Substance Use Topics    Alcohol use: Yes     Comment: 1 glass wine/day     Family History   Problem Relation Age of Onset    C.A.D. Mother     Parkinsonism Mother 80    Breast Cancer Mother 80    Hypertension Mother     Osteoporosis Mother     Obesity Sister     Obesity Brother     Circulatory Maternal Grandmother         MVP    Aneurysm Paternal Grandmother 65    Alcohol/Drug Daughter 30    Cerebrovascular Disease Son 40        stroke, PFO    Circulatory Maternal Aunt         MVP    Breast Cancer Maternal Aunt 78    Breast Cancer Other         Maternal Aunt         Current Outpatient Medications   Medication Sig Dispense Refill    alendronate (FOSAMAX) 70 MG tablet Take 1 tablet (70 mg) by mouth every 7 days. 12 tablet 3    aspirin 81 MG EC tablet Take 81 mg by mouth daily      atorvastatin (LIPITOR) 20 MG tablet TAKE 1 TABLET BY MOUTH EVERY DAY 90 tablet 0    Cholecalciferol (VITAMIN D3) 1000 units  "CAPS Take 1 capsule by mouth daily      FINASTERIDE PO Apply 1 Application topically      losartan (COZAAR) 25 MG tablet TAKE 1 TABLET BY MOUTH EVERY DAY 90 tablet 3    Metamucil Fiber 2 g CHEW Take by mouth.      metoprolol tartrate (LOPRESSOR) 50 MG tablet TAKE 1 TABLET BY MOUTH TWICE A  tablet 3    Probiotic Product (PROBIOTIC PO)       Tdap, tetanus-diptheria-acell pertussis, (BOOSTRIX) 5-2.5-18.5 LF-MCG/0.5 NEENA injection Inject 0.5 mLs into the muscle once for 1 dose. 0.5 mL 0    tretinoin (RETIN-A) 0.025 % external cream        Allergies   Allergen Reactions    Penicillins      \"severe migraines\"     Current providers sharing in care for this patient include:  Patient Care Team:  Carline Palm MD as PCP - General (Internal Medicine)  Carline Palm MD as Assigned PCP    The following health maintenance items are reviewed in Epic and correct as of today:  Health Maintenance   Topic Date Due    DTAP/TDAP/TD IMMUNIZATION (2 - Td or Tdap) 11/01/2024    LIPID  12/05/2024    BMP  03/02/2025    CMP  03/02/2025    CBC  03/02/2025    MAMMO SCREENING  06/26/2025    MEDICARE ANNUAL WELLNESS VISIT  12/17/2025    ANNUAL REVIEW OF HM ORDERS  12/17/2025    FALL RISK ASSESSMENT  12/17/2025    GLUCOSE  03/02/2027    COLORECTAL CANCER SCREENING  04/02/2029    ADVANCE CARE PLANNING  12/17/2029    DEXA  04/08/2039    HEPATITIS C SCREENING  Completed    PHQ-2 (once per calendar year)  Completed    INFLUENZA VACCINE  Completed    Pneumococcal Vaccine: 65+ Years  Completed    ZOSTER IMMUNIZATION  Completed    RSV VACCINE  Completed    COVID-19 Vaccine  Completed    HPV IMMUNIZATION  Aged Out    MENINGITIS IMMUNIZATION  Aged Out    RSV MONOCLONAL ANTIBODY  Aged Out         Review of Systems  Constitutional, HEENT, cardiovascular, pulmonary, GI, , musculoskeletal, neuro, skin, endocrine and psych systems are negative, except as otherwise noted.     Objective    Exam  /62   Pulse 66   Temp 98.2  F (36.8  C) " "(Temporal)   Resp 16   Ht 1.664 m (5' 5.5\")   Wt 61.2 kg (135 lb)   SpO2 99%   BMI 22.12 kg/m     Estimated body mass index is 22.12 kg/m  as calculated from the following:    Height as of this encounter: 1.664 m (5' 5.5\").    Weight as of this encounter: 61.2 kg (135 lb).    Physical Exam  GENERAL: alert and no distress  EYES: Eyes grossly normal to inspection, PERRL and conjunctivae and sclerae normal  HENT: ear canals and TM's normal, nose and mouth without ulcers or lesions  NECK: no adenopathy, no asymmetry, masses, or scars  RESP: lungs clear to auscultation - no rales, rhonchi or wheezes  BREAST: normal without masses, tenderness or nipple discharge and no palpable axillary masses , pea sized left upper cervical adenopathy  CV: surgery scar healthy  regular rate and rhythm, normal S1 S2, no S3 or S4, no murmur, click or rub, no peripheral edema  ABDOMEN: soft, nontender, no hepatosplenomegaly, no masses and bowel sounds normal  MS: no gross musculoskeletal defects noted, no edema  SKIN: rt lower eye lid new lesion, frecklesno suspicious lesions or rashes  NEURO: Normal strength and tone, mentation intact and speech normal  PSYCH: mentation appears normal, affect normal/bright         12/17/2024   Mini Cog   Clock Draw Score 2 Normal   3 Item Recall 3 objects recalled   Mini Cog Total Score 5                 Signed Electronically by: Carline Palm MD    "

## 2025-01-11 DIAGNOSIS — E78.5 HYPERLIPIDEMIA LDL GOAL <70: ICD-10-CM

## 2025-01-13 RX ORDER — ATORVASTATIN CALCIUM 20 MG/1
20 TABLET, FILM COATED ORAL DAILY
Qty: 90 TABLET | Refills: 2 | Status: SHIPPED | OUTPATIENT
Start: 2025-01-13

## 2025-05-03 ENCOUNTER — HEALTH MAINTENANCE LETTER (OUTPATIENT)
Age: 75
End: 2025-05-03

## 2025-05-28 ENCOUNTER — PATIENT OUTREACH (OUTPATIENT)
Dept: CARE COORDINATION | Facility: CLINIC | Age: 75
End: 2025-05-28
Payer: COMMERCIAL

## 2025-06-25 ENCOUNTER — PATIENT OUTREACH (OUTPATIENT)
Dept: CARE COORDINATION | Facility: CLINIC | Age: 75
End: 2025-06-25
Payer: COMMERCIAL

## 2025-08-10 DIAGNOSIS — I10 PRIMARY HYPERTENSION: ICD-10-CM

## 2025-08-11 RX ORDER — LOSARTAN POTASSIUM 25 MG/1
25 TABLET ORAL DAILY
Qty: 90 TABLET | Refills: 3 | Status: SHIPPED | OUTPATIENT
Start: 2025-08-11

## (undated) RX ORDER — FENTANYL CITRATE 50 UG/ML
INJECTION, SOLUTION INTRAMUSCULAR; INTRAVENOUS
Status: DISPENSED
Start: 2017-06-30